# Patient Record
Sex: FEMALE | Race: WHITE | NOT HISPANIC OR LATINO | Employment: OTHER | URBAN - METROPOLITAN AREA
[De-identification: names, ages, dates, MRNs, and addresses within clinical notes are randomized per-mention and may not be internally consistent; named-entity substitution may affect disease eponyms.]

---

## 2022-05-31 ENCOUNTER — OFFICE VISIT (OUTPATIENT)
Dept: PAIN MEDICINE | Facility: CLINIC | Age: 67
End: 2022-05-31

## 2022-05-31 VITALS
RESPIRATION RATE: 16 BRPM | OXYGEN SATURATION: 95 % | DIASTOLIC BLOOD PRESSURE: 72 MMHG | BODY MASS INDEX: 33.75 KG/M2 | SYSTOLIC BLOOD PRESSURE: 132 MMHG | HEART RATE: 77 BPM | WEIGHT: 210 LBS | HEIGHT: 66 IN

## 2022-05-31 DIAGNOSIS — M47.817 LUMBOSACRAL SPONDYLOSIS WITHOUT MYELOPATHY: ICD-10-CM

## 2022-05-31 DIAGNOSIS — M54.16 LUMBAR RADICULITIS: ICD-10-CM

## 2022-05-31 DIAGNOSIS — G89.4 CHRONIC PAIN SYNDROME: Primary | ICD-10-CM

## 2022-05-31 DIAGNOSIS — M79.18 MYOFASCIAL PAIN SYNDROME: ICD-10-CM

## 2022-05-31 PROCEDURE — 99204 OFFICE O/P NEW MOD 45 MIN: CPT | Performed by: ANESTHESIOLOGY

## 2022-05-31 RX ORDER — OMEPRAZOLE 20 MG/1
20 CAPSULE, DELAYED RELEASE ORAL DAILY
COMMUNITY

## 2022-05-31 RX ORDER — HYDROCHLOROTHIAZIDE 25 MG/1
25 TABLET ORAL DAILY
COMMUNITY

## 2022-05-31 RX ORDER — DILTIAZEM HYDROCHLORIDE 180 MG/1
180 CAPSULE, EXTENDED RELEASE ORAL DAILY
COMMUNITY
Start: 2022-04-04

## 2022-05-31 RX ORDER — HYDRALAZINE HYDROCHLORIDE 25 MG/1
25 TABLET, FILM COATED ORAL 2 TIMES DAILY
COMMUNITY

## 2022-05-31 RX ORDER — MELOXICAM 15 MG/1
15 TABLET ORAL DAILY
COMMUNITY

## 2022-05-31 RX ORDER — FLUOXETINE HYDROCHLORIDE 20 MG/1
40 CAPSULE ORAL DAILY
COMMUNITY

## 2022-05-31 RX ORDER — THYROID,PORK 90 MG
90 TABLET ORAL DAILY
COMMUNITY
Start: 2022-04-27

## 2022-05-31 NOTE — PROGRESS NOTES
Subjective   CC back pain  Bernice Barrow is a 67 y.o. female with chronic back pain here for initial evaluation.  Referred by PCP.  Complains of several months of progressively worsening back pain mostly axial occasionally radiating to both hips worse on the right with burning throbbing exacerbated by walking standing or prolonged activity.  Denies saddle anesthesia, weakness, bladder bowel incontinence.  Tried home exercise program, seen chiropractor, tried muscle relaxer anti-inflammatories without relief.  Pain significantly impairing ADL.    Pain Assessment   Location of Pain: Lower Back, R Hip, L Hip,  Description of Pain: Dull/Aching, Throbbing, Stabbing  Previous Pain Rating : 8  Current Pain Ratin  Aggravating Factors: Activity  Alleviating Factors: Rest, Medication  Pain onset over 12 weeks  Interferes with ADL's.   [place] back pain disability scale 68    PEG Assessment   What number best describes your pain on average in the past week?8  What number best describes how, during the past week, pain has interfered with your enjoyment of life?4  What number best describes how, during the past week, pain has interfered with your general activity?9      The following portions of the patient's history were reviewed and updated as appropriate: allergies, current medications, past family history, past medical history, past social history, past surgical history and problem list.     has a past medical history of Arthritis, Bronchitis, Hypertension, Low back pain, and Thyroid disease.   has a past surgical history that includes Replacement total knee (Right); Replacement total knee (Left); Foot surgery (Bilateral); and Colonoscopy.  family history is not on file.  Social History     Tobacco Use   • Smoking status: Former Smoker     Packs/day: 0.50     Quit date:      Years since quittin.4   • Smokeless tobacco: Never Used   Substance Use Topics   • Alcohol use: Yes     Comment: natividadassshawn  "      Review of Systems   Musculoskeletal: Positive for back pain.   All other systems reviewed and are negative.      Objective   Physical Exam  Vitals reviewed.   Constitutional:       General: She is not in acute distress.     Appearance: She is obese.   Musculoskeletal:      Lumbar back: Tenderness present. Decreased range of motion. Positive right straight leg raise test.      Comments: Lumbar loading positive, pain on extension of low back past 5 degrees.  TTP on the lumbar facets noted.         /72   Pulse 77   Resp 16   Ht 167.6 cm (66\")   Wt 95.3 kg (210 lb)   SpO2 95%   BMI 33.89 kg/m²     PHQ 9 on chart  Opioid risk tool low risk      Assessment & Plan   Diagnoses and all orders for this visit:    1. Chronic pain syndrome (Primary)    2. Lumbosacral spondylosis without myelopathy  -     MRI Lumbar Spine Without Contrast; Future    3. Lumbar radiculitis  -     MRI Lumbar Spine Without Contrast; Future  -     Epidural Block    4. Myofascial pain syndrome    Summary  Bernice Barrow is a 67 y.o. female with chronic back pain here for initial evaluation.  Referred by PCP.  Complains of several months of progressively worsening back pain mostly axial occasionally radiating to both hips worse on the right with burning throbbing exacerbated by walking standing or prolonged activity.  Denies saddle anesthesia, weakness, bladder bowel incontinence.  Tried home exercise program, seen chiropractor, tried muscle relaxer anti-inflammatories without relief.  Pain significantly impairing ADL.    L-spine MRI ordered for further evaluation.  Failed conservative therapy.  We will schedule for LESI after MRI review.  Risk and benefits discussed.    RTC for procedure        "

## 2022-06-01 ENCOUNTER — PATIENT ROUNDING (BHMG ONLY) (OUTPATIENT)
Dept: PAIN MEDICINE | Facility: CLINIC | Age: 67
End: 2022-06-01

## 2022-06-01 NOTE — PROGRESS NOTES
June 1, 2022    Hello, may I speak with Bernice Barrow?    My name is Jayleen     I am  with [facility]  [address]  [place] [address].    Before we get started may I verify your date of birth? 1955    I am calling to officially welcome you to our practice and ask about your recent visit. Is this a good time to talk? yes    Tell me about your visit with us. What things went well?  Left message on machine to call me back       We're always looking for ways to make our patients' experiences even better. Do you have recommendations on ways we may improve?  No    Overall were you satisfied with your first visit to our practice? N/A       I appreciate you taking the time to speak with me today. Is there anything else I can do for you? N/A      Thank you, and have a great day.

## 2022-07-08 ENCOUNTER — HOSPITAL ENCOUNTER (OUTPATIENT)
Dept: PAIN MEDICINE | Facility: HOSPITAL | Age: 67
Discharge: HOME | End: 2022-07-08

## 2022-07-08 VITALS
BODY MASS INDEX: 33.75 KG/M2 | OXYGEN SATURATION: 98 % | HEART RATE: 74 BPM | SYSTOLIC BLOOD PRESSURE: 158 MMHG | RESPIRATION RATE: 16 BRPM | WEIGHT: 210 LBS | HEIGHT: 66 IN | DIASTOLIC BLOOD PRESSURE: 82 MMHG | TEMPERATURE: 97.8 F

## 2022-07-08 DIAGNOSIS — R52 PAIN: ICD-10-CM

## 2022-07-08 DIAGNOSIS — M54.16 LUMBAR RADICULITIS: Primary | ICD-10-CM

## 2022-07-08 PROCEDURE — 0 IOPAMIDOL 41 % SOLUTION: Performed by: ANESTHESIOLOGY

## 2022-07-08 PROCEDURE — 62323 NJX INTERLAMINAR LMBR/SAC: CPT | Performed by: ANESTHESIOLOGY

## 2022-07-08 PROCEDURE — 77003 FLUOROGUIDE FOR SPINE INJECT: CPT

## 2022-07-08 PROCEDURE — 25010000002 METHYLPREDNISOLONE PER 40 MG: Performed by: ANESTHESIOLOGY

## 2022-07-08 RX ORDER — METHYLPREDNISOLONE ACETATE 40 MG/ML
40 INJECTION, SUSPENSION INTRA-ARTICULAR; INTRALESIONAL; INTRAMUSCULAR; SOFT TISSUE ONCE
Status: COMPLETED | OUTPATIENT
Start: 2022-07-08 | End: 2022-07-08

## 2022-07-08 RX ADMIN — IOPAMIDOL 3 ML: 408 INJECTION, SOLUTION INTRATHECAL at 11:22

## 2022-07-08 RX ADMIN — METHYLPREDNISOLONE ACETATE 40 MG: 40 INJECTION, SUSPENSION INTRA-ARTICULAR; INTRALESIONAL; INTRAMUSCULAR; INTRASYNOVIAL; SOFT TISSUE at 11:22

## 2022-07-08 NOTE — DISCHARGE INSTRUCTIONS

## 2022-07-08 NOTE — PROCEDURES
"Subjective   CC back pain  Bernice Barrow is a 67 y.o. female with lumbar radiculitis here for LESI. No anticoagulation    Pain Assessment   Location of Pain: Lower Back, R Hip, L Hip, L Leg, neck pain, joint  Description of Pain: Dull/Aching, Throbbing, Stabbing  Previous Pain Rating :7  Current Pain Ratin  Aggravating Factors: Activity  Alleviating Factors: Rest, Medication  Pain onset over 12 weeks  Pain interferes with ADL's    The following portions of the patient's history were reviewed and updated as appropriate: allergies, current medications, past family history, past medical history, past social history, past surgical history and problem list.      Review of Systems  As in HPI  Objective   Physical Exam  Constitutional:       General: She is not in acute distress.     Appearance: She is well-developed.   Cardiovascular:      Rate and Rhythm: Normal rate.   Pulmonary:      Effort: Pulmonary effort is normal.   Musculoskeletal:      Lumbar back: Tenderness present. Decreased range of motion.   Neurological:      Mental Status: She is alert and oriented to person, place, and time.       /90 (BP Location: Right arm, Patient Position: Sitting)   Pulse 77   Temp 97.8 °F (36.6 °C) (Skin)   Resp 16   Ht 167.6 cm (66\")   Wt 95.3 kg (210 lb)   SpO2 99%   BMI 33.89 kg/m²     Assessment & Plan    underwent LESI    RTC 4-6 weeks or as needed for repeat    DATE OF PROCEDURE: 2022    PREOPERATIVE DIAGNOSIS: lumbar DDD/radiculitis    POSTOPERATIVE DIAGNOSIS: same    PROCEDURE PERFORMED:  lumbar Epidural Steroid Injection    The patient presents with a history of   lumbar degenerative disc disease with  radiculitis. The patient presents today for a  lumbar epidural steroid injection at level  L5/S1.   The patient was seen in the preoperative area.  Patient's consent was obtained and updated.  Vitals were taken.  Patient was then brought to the procedure suite and placed in a prone position. The " appropriate anatomic area was widely prepped with Chloraprep and draped in a sterile fashion. Noninvasive monitoring per routine anesthesia protocol was placed.  Under fluoroscopic guidance using  AP view a 20 gauge styleted tuohy needle was passed through skin anesthetized with 1% Lidocaine without epinephrine.  The needle was advanced using the continuous loss of resistance to saline technique into the L5 epidural space. Needle tip placement in the epidural space was confirmed by loss of resistance and injection of 1 mL of  preservative free contrast.  Following this 8 mL of a solution containing  1 mL of 40 mg Depo-Medrol and 7 mL of preservative-free saline was carefully administered in the epidural space.  A sterile dressing was placed over the puncture site.    The patient tolerated the procedure with no complications . They were then brought to the post procedure area where they recovered nicely.

## 2022-08-22 ENCOUNTER — OFFICE VISIT (OUTPATIENT)
Dept: SLEEP MEDICINE | Facility: CLINIC | Age: 67
End: 2022-08-22

## 2022-08-22 VITALS
HEIGHT: 66 IN | SYSTOLIC BLOOD PRESSURE: 135 MMHG | HEART RATE: 74 BPM | DIASTOLIC BLOOD PRESSURE: 89 MMHG | BODY MASS INDEX: 33.75 KG/M2 | OXYGEN SATURATION: 98 % | WEIGHT: 210 LBS

## 2022-08-22 DIAGNOSIS — G47.10 HYPERSOMNIA: ICD-10-CM

## 2022-08-22 DIAGNOSIS — E66.9 OBESITY (BMI 30-39.9): ICD-10-CM

## 2022-08-22 DIAGNOSIS — G47.30 SLEEP APNEA, UNSPECIFIED TYPE: Primary | ICD-10-CM

## 2022-08-22 DIAGNOSIS — G47.8 NON-RESTORATIVE SLEEP: ICD-10-CM

## 2022-08-22 PROCEDURE — G0463 HOSPITAL OUTPT CLINIC VISIT: HCPCS

## 2022-08-22 PROCEDURE — 99204 OFFICE O/P NEW MOD 45 MIN: CPT | Performed by: FAMILY MEDICINE

## 2022-08-22 NOTE — PROGRESS NOTES
[facility] New Patient/Consultation       Reason for Consultation: celestino      Patient Care Team:  Cherri Meyer MD as PCP - General (Family Medicine)  Andreea Morillo MD as Consulting Physician (Sleep Medicine)      History of present illness:  Thank you for asking me to see your patient.  The patient is a 67 y.o. female with hypertension hypothyroidism GERD and CELESTINO presents today to establish care for CELESTINO.  Sleep study over 10 years ago.  Machine is well over 5 years old.  Cannot pull data completely as machine is old.  Overdue for new machine.    Bedtime 8 PM to 9 PM sleep latency 2 to 3 hours wake time 7 AM to 8 AM sleeps around 8 hours sometimes 1 nap per day.  Without CPAP machine will have witnessed apneas waking gasping for breath hypersomnia nonrestorative sleep.  Nocturia 2-3 times a night.  There is a family history of sleep apnea.    Social History: Retired no tobacco use 2 caffeine beverages a day no drug use    Allergies:  Hydrocodone    Family History: CELESTINO yes       Current Outpatient Medications:   •  Leon Thyroid 90 MG tablet, Take 90 mg by mouth Daily., Disp: , Rfl:   •  dilTIAZem (TIAZAC) 180 MG 24 hr capsule, Take 180 mg by mouth Daily., Disp: , Rfl:   •  FLUoxetine (PROzac) 20 MG capsule, Take 40 mg by mouth Daily., Disp: , Rfl:   •  hydrALAZINE (APRESOLINE) 25 MG tablet, Take 25 mg by mouth 2 (Two) Times a Day., Disp: , Rfl:   •  hydroCHLOROthiazide (HYDRODIURIL) 25 MG tablet, Take 25 mg by mouth Daily., Disp: , Rfl:   •  meloxicam (MOBIC) 15 MG tablet, Take 15 mg by mouth Daily., Disp: , Rfl:   •  Mirabegron ER (Myrbetriq) 25 MG tablet sustained-release 24 hour 24 hr tablet, Take 25 mg by mouth Every Night., Disp: , Rfl:   •  omeprazole (priLOSEC) 20 MG capsule, Take 20 mg by mouth Daily., Disp: , Rfl:     Vital Signs:    Vitals:    08/22/22 1124   BP: 135/89   BP Location: Left arm   Patient Position: Sitting   Cuff Size: Adult   Pulse: 74   SpO2: 98%   Weight: 95.3 kg (210 lb)  "  Height: 167.6 cm (66\")      Body mass index is 33.89 kg/m².         REVIEW OF SYSTEMS.  Full review of systems available on the intake form which is scanned in the media tab.  The relevant positive are noted below  1. Daytime excessive sleepiness with [place] Sleepiness Scale :Total score: 5   2. Snoring  3. Frequent urination      Physical exam:  Vitals:    08/22/22 1124   BP: 135/89   BP Location: Left arm   Patient Position: Sitting   Cuff Size: Adult   Pulse: 74   SpO2: 98%   Weight: 95.3 kg (210 lb)   Height: 167.6 cm (66\")    Body mass index is 33.89 kg/m².    HEENT: Head is atraumatic, normocephalic  Eyes: pupils are round equal and reacting to light and accommodation, conjunctiva normal  RESPIRATORY SYSTEM: Regular respirations  CARDIOVASULAR SYSTEM: Regular rate  EXTREMITES: No cyanosis, clubbing  NEUROLOGICAL SYSTEM: Oriented x 3, no gross motor defects, gait normal      Impression:  1. Sleep apnea, unspecified type    2. Hypersomnia    3. Non-restorative sleep    4. Obesity (BMI 30-39.9)        Plan:    Good sleep hygiene measures should be maintained.  Weight loss would be beneficial in this patient who is obese BMI 33.9.    I discussed the pathophysiology of obstructive sleep apnea with the patient.  We discussed the adverse outcomes associated with untreated sleep-disordered breathing.  We discussed treatment modalities of obstructive sleep apnea including CPAP device as well as oral mandibular advancement device. Sleep study will be scheduled to establish definitive diagnosis of sleep disorder breathing.  Weight loss will be strongly beneficial in order to reduce the severity of sleep-disordered breathing.  Patient has narrow oropharyngeal structure.  Caution during activities that require prolonged concentration is strongly advised.  Patient will be notified of sleep study results after sleep study is completed.  If sleep apnea is only mild,  oral mandibular advancement device may be one of the " treatment options.  However if sleep apnea is moderately severe, CPAP treatment will be strongly encouraged.  The patient is not opposed to treatment with CPAP device if we confirm significant obstructive sleep apnea on polysomnography. Patient prefers HST.    Reassess severity of ADY with sleep study.  Then can place order for new auto CPAP machine.  Return to clinic after at least 1 month of CPAP for follow-up or sooner if needed.1 machine when he was brand-new    Thank you for allowing me to participate in your patient's care.    Andreea Morillo MD  Sleep Medicine  08/22/22  11:39 EDT

## 2022-09-15 ENCOUNTER — HOSPITAL ENCOUNTER (OUTPATIENT)
Dept: SLEEP MEDICINE | Facility: HOSPITAL | Age: 67
Discharge: HOME | End: 2022-09-15
Admitting: FAMILY MEDICINE

## 2022-09-15 DIAGNOSIS — G47.10 HYPERSOMNIA: ICD-10-CM

## 2022-09-15 DIAGNOSIS — G47.8 NON-RESTORATIVE SLEEP: ICD-10-CM

## 2022-09-15 DIAGNOSIS — G47.30 SLEEP APNEA, UNSPECIFIED TYPE: ICD-10-CM

## 2022-09-15 DIAGNOSIS — E66.9 OBESITY (BMI 30-39.9): ICD-10-CM

## 2022-09-15 PROCEDURE — 95806 SLEEP STUDY UNATT&RESP EFFT: CPT

## 2022-09-15 PROCEDURE — 95806 SLEEP STUDY UNATT&RESP EFFT: CPT | Performed by: FAMILY MEDICINE

## 2022-09-19 DIAGNOSIS — G47.8 NON-RESTORATIVE SLEEP: ICD-10-CM

## 2022-09-19 DIAGNOSIS — G47.33 OBSTRUCTIVE SLEEP APNEA: Primary | ICD-10-CM

## 2022-09-19 DIAGNOSIS — E66.9 OBESITY (BMI 30-39.9): ICD-10-CM

## 2022-09-19 DIAGNOSIS — G47.10 HYPERSOMNIA: ICD-10-CM

## 2022-11-10 ENCOUNTER — LAB (OUTPATIENT)
Dept: LAB | Facility: HOSPITAL | Age: 67
End: 2022-11-10

## 2022-11-10 ENCOUNTER — TRANSCRIBE ORDERS (OUTPATIENT)
Dept: ADMINISTRATIVE | Facility: HOSPITAL | Age: 67
End: 2022-11-10

## 2022-11-10 ENCOUNTER — HOSPITAL ENCOUNTER (OUTPATIENT)
Dept: CARDIOLOGY | Facility: HOSPITAL | Age: 67
Discharge: HOME | End: 2022-11-10

## 2022-11-10 DIAGNOSIS — N39.46 MIXED INCONTINENCE URGE AND STRESS (MALE)(FEMALE): Primary | ICD-10-CM

## 2022-11-10 DIAGNOSIS — Z01.818 PREOP TESTING: ICD-10-CM

## 2022-11-10 DIAGNOSIS — N39.46 MIXED INCONTINENCE URGE AND STRESS (MALE)(FEMALE): ICD-10-CM

## 2022-11-10 DIAGNOSIS — N36.2 URETHRAL CARUNCLE: ICD-10-CM

## 2022-11-10 LAB
ANION GAP SERPL CALCULATED.3IONS-SCNC: 10.9 MMOL/L (ref 5–15)
BASOPHILS # BLD AUTO: 0.12 10*3/MM3 (ref 0–0.2)
BASOPHILS NFR BLD AUTO: 1.3 % (ref 0–1.5)
BUN SERPL-MCNC: 22 MG/DL (ref 8–23)
BUN/CREAT SERPL: 20 (ref 7–25)
CALCIUM SPEC-SCNC: 9.7 MG/DL (ref 8.6–10.5)
CHLORIDE SERPL-SCNC: 103 MMOL/L (ref 98–107)
CO2 SERPL-SCNC: 25.1 MMOL/L (ref 22–29)
CREAT SERPL-MCNC: 1.1 MG/DL (ref 0.57–1)
DEPRECATED RDW RBC AUTO: 39.9 FL (ref 37–54)
EGFRCR SERPLBLD CKD-EPI 2021: 55.9 ML/MIN/1.73
EOSINOPHIL # BLD AUTO: 0.31 10*3/MM3 (ref 0–0.4)
EOSINOPHIL NFR BLD AUTO: 3.4 % (ref 0.3–6.2)
ERYTHROCYTE [DISTWIDTH] IN BLOOD BY AUTOMATED COUNT: 13.3 % (ref 12.3–15.4)
GLUCOSE SERPL-MCNC: 94 MG/DL (ref 65–99)
HCT VFR BLD AUTO: 37.5 % (ref 34–46.6)
HGB BLD-MCNC: 12 G/DL (ref 12–15.9)
IMM GRANULOCYTES # BLD AUTO: 0.05 10*3/MM3 (ref 0–0.05)
IMM GRANULOCYTES NFR BLD AUTO: 0.5 % (ref 0–0.5)
LYMPHOCYTES # BLD AUTO: 2.06 10*3/MM3 (ref 0.7–3.1)
LYMPHOCYTES NFR BLD AUTO: 22.6 % (ref 19.6–45.3)
MCH RBC QN AUTO: 26.4 PG (ref 26.6–33)
MCHC RBC AUTO-ENTMCNC: 32 G/DL (ref 31.5–35.7)
MCV RBC AUTO: 82.4 FL (ref 79–97)
MONOCYTES # BLD AUTO: 0.76 10*3/MM3 (ref 0.1–0.9)
MONOCYTES NFR BLD AUTO: 8.3 % (ref 5–12)
NEUTROPHILS NFR BLD AUTO: 5.82 10*3/MM3 (ref 1.7–7)
NEUTROPHILS NFR BLD AUTO: 63.9 % (ref 42.7–76)
NRBC BLD AUTO-RTO: 0 /100 WBC (ref 0–0.2)
PLATELET # BLD AUTO: 344 10*3/MM3 (ref 140–450)
PMV BLD AUTO: 11.1 FL (ref 6–12)
POTASSIUM SERPL-SCNC: 4.1 MMOL/L (ref 3.5–5.2)
RBC # BLD AUTO: 4.55 10*6/MM3 (ref 3.77–5.28)
SODIUM SERPL-SCNC: 139 MMOL/L (ref 136–145)
WBC NRBC COR # BLD: 9.12 10*3/MM3 (ref 3.4–10.8)

## 2022-11-10 PROCEDURE — 93005 ELECTROCARDIOGRAM TRACING: CPT | Performed by: UROLOGY

## 2022-11-10 PROCEDURE — 85025 COMPLETE CBC W/AUTO DIFF WBC: CPT

## 2022-11-10 PROCEDURE — 93010 ELECTROCARDIOGRAM REPORT: CPT | Performed by: INTERNAL MEDICINE

## 2022-11-10 PROCEDURE — 36415 COLL VENOUS BLD VENIPUNCTURE: CPT

## 2022-11-10 PROCEDURE — 80048 BASIC METABOLIC PNL TOTAL CA: CPT

## 2022-11-13 LAB — QT INTERVAL: 403 MS

## 2023-09-23 ENCOUNTER — APPOINTMENT (OUTPATIENT)
Dept: GENERAL RADIOLOGY | Facility: HOSPITAL | Age: 68
End: 2023-09-23
Payer: MEDICARE

## 2023-09-23 ENCOUNTER — HOSPITAL ENCOUNTER (OUTPATIENT)
Facility: HOSPITAL | Age: 68
Discharge: HOME | End: 2023-09-23
Attending: PEDIATRICS
Payer: MEDICARE

## 2023-09-23 VITALS
SYSTOLIC BLOOD PRESSURE: 142 MMHG | HEART RATE: 86 BPM | WEIGHT: 197 LBS | RESPIRATION RATE: 17 BRPM | HEIGHT: 66 IN | BODY MASS INDEX: 31.66 KG/M2 | DIASTOLIC BLOOD PRESSURE: 78 MMHG | TEMPERATURE: 98.2 F | OXYGEN SATURATION: 95 %

## 2023-09-23 DIAGNOSIS — J30.89 ALLERGIC RHINITIS DUE TO OTHER ALLERGIC TRIGGER, UNSPECIFIED SEASONALITY: ICD-10-CM

## 2023-09-23 DIAGNOSIS — J40 BRONCHITIS: Primary | ICD-10-CM

## 2023-09-23 LAB
FLUAV SUBTYP SPEC NAA+PROBE: NOT DETECTED
FLUBV RNA ISLT QL NAA+PROBE: NOT DETECTED
SARS-COV-2 RNA RESP QL NAA+PROBE: NOT DETECTED

## 2023-09-23 PROCEDURE — 87636 SARSCOV2 & INF A&B AMP PRB: CPT | Performed by: PEDIATRICS

## 2023-09-23 PROCEDURE — 71046 X-RAY EXAM CHEST 2 VIEWS: CPT

## 2023-09-23 PROCEDURE — G0463 HOSPITAL OUTPT CLINIC VISIT: HCPCS | Performed by: NURSE PRACTITIONER

## 2023-09-23 RX ORDER — AMOXICILLIN AND CLAVULANATE POTASSIUM 875; 125 MG/1; MG/1
1 TABLET, FILM COATED ORAL 2 TIMES DAILY
Qty: 20 TABLET | Refills: 0 | Status: SHIPPED | OUTPATIENT
Start: 2023-09-23 | End: 2023-10-03

## 2023-09-23 RX ORDER — PREDNISONE 20 MG/1
40 TABLET ORAL DAILY
Qty: 14 TABLET | Refills: 0 | Status: SHIPPED | OUTPATIENT
Start: 2023-09-23 | End: 2023-09-30

## 2023-09-23 NOTE — DISCHARGE INSTRUCTIONS
Follow-up with primary care for further evaluation and treatment.    Return to the ER for new or worsening symptoms.    Medications as prescribed    Make sure you are drinking plenty of water    Tylenol/Motrin as needed for pain/fevers

## 2023-09-23 NOTE — FSED PROVIDER NOTE
Subjective   History of Present Illness  The patient is a 68-year-old female who presents to the ER with cough and congestion that started 1 to 2 weeks ago.  Patient reports she has had low-grade fevers at home.  Patient reports that she did have a productive cough of colored sputum but now she is unable to cough anything up. Patient reports she does get bronchitis usually twice a year, but usually not this early.     History provided by:  Patient   used: No      Review of Systems   Constitutional:  Positive for fever.   HENT:  Positive for congestion.    Respiratory:  Positive for cough.      Past Medical History:   Diagnosis Date    Arthritis     Bronchitis     h/o    Hypertension     Low back pain     Thyroid disease        Allergies   Allergen Reactions    Hydrocodone Nausea And Vomiting       Past Surgical History:   Procedure Laterality Date    COLONOSCOPY      FOOT SURGERY Bilateral     heel spurs-- and rt  foot fx    REPLACEMENT TOTAL KNEE Right         REPLACEMENT TOTAL KNEE Left            No family history on file.    Social History     Socioeconomic History    Marital status:    Tobacco Use    Smoking status: Former     Packs/day: 0.50     Types: Cigarettes     Quit date:      Years since quittin.7    Smokeless tobacco: Never   Vaping Use    Vaping Use: Former   Substance and Sexual Activity    Alcohol use: Yes     Comment: ocassional    Drug use: Not Currently    Sexual activity: Defer           Objective   Physical Exam  Vitals and nursing note reviewed.   HENT:      Head: Normocephalic.      Right Ear: Tympanic membrane, ear canal and external ear normal.      Left Ear: Tympanic membrane, ear canal and external ear normal.      Nose: Nose normal.      Mouth/Throat:      Lips: Pink.      Mouth: Mucous membranes are moist.      Pharynx: Oropharynx is clear. Uvula midline.   Eyes:      Extraocular Movements: Extraocular movements intact.      Conjunctiva/sclera:  Conjunctivae normal.      Pupils: Pupils are equal, round, and reactive to light.   Cardiovascular:      Rate and Rhythm: Normal rate and regular rhythm.      Pulses: Normal pulses.      Heart sounds: Normal heart sounds.   Pulmonary:      Effort: Pulmonary effort is normal.      Breath sounds: Normal breath sounds and air entry.      Comments: Patient with productive cough at times, had more of a productive cough in the beginning and now is not able to cough up anything.   Abdominal:      General: Bowel sounds are normal.      Palpations: Abdomen is soft.   Musculoskeletal:         General: Normal range of motion.      Cervical back: Normal range of motion and neck supple.   Skin:     General: Skin is warm and dry.   Neurological:      General: No focal deficit present.      Mental Status: She is alert and oriented to person, place, and time.   Psychiatric:         Behavior: Behavior normal. Behavior is cooperative.       Procedures           ED Course  ED Course as of 09/23/23 1731   Sat Sep 23, 2023   1657 XR CHEST 2 VW     Date of Exam: 9/23/2023 4:02 PM EDT     Indication: Cough     Comparison: Chest radiograph dated 1/5/2019     Findings:  The cardiomediastinal silhouette is within normal limits. There is aortic arch atherosclerotic calcification. Pulmonary vascularity appears normal. There is no focal airspace consolidation, pleural effusion, or pneumothorax. There are degenerative   changes of the thoracic spine.     IMPRESSION:  Impression:  1. No acute cardiopulmonary abnormality.      [DS]   1708 COVID19: Not Detected [DS]   1708 Influenza A PCR: Not Detected [DS]   1708 Influenza B PCR: Not Detected [DS]      ED Course User Index  [DS] Nicki Cabral APRN                                           Medical Decision Making  This patient presents with symptoms suspicious for likely viral upper respiratory infection. Based on history and physical doubt sinusitis. COVID/Flu is negative. CXR is negative  at this time.  Do not suspect underlying cardiopulmonary process. I considered, but think unlikely, dangerous causes of this patient's symptoms to include ACS, CHF or COPD exacerbations, pneumonia, pneumothorax. Patient is nontoxic appearing and not in need of emergent medical intervention. Patient reports low grade fevers at times, productive cough at times. Patient reports she has been having symptoms for the past week.     Will treat patient with augmentin and steroids, patient advised that this could be viral, and the antibiotic may not help.      Problems Addressed:  Allergic rhinitis due to other allergic trigger, unspecified seasonality: self-limited or minor problem  Bronchitis: self-limited or minor problem    Amount and/or Complexity of Data Reviewed  Labs:  Decision-making details documented in ED Course.  Radiology: ordered. Decision-making details documented in ED Course.    Risk  Prescription drug management.        Final diagnoses:   Bronchitis   Allergic rhinitis due to other allergic trigger, unspecified seasonality       ED Disposition  ED Disposition       ED Disposition   Discharge    Condition   Stable    Comment   --               PATIENT CONNECTION - [facility] [address] [phone]  In 1 week  As needed, If symptoms worsen, if you call this number they will help you find a primary care physician if needed.         Medication List        New Prescriptions      amoxicillin-clavulanate 875-125 MG per tablet  Commonly known as: AUGMENTIN  Take 1 tablet by mouth 2 (Two) Times a Day for 10 days.     predniSONE 20 MG tablet  Commonly known as: DELTASONE  Take 2 tablets by mouth Daily for 7 days.               Where to Get Your Medications        These medications were sent to [id number] - [place], [place] - [address] [facility] [address] - [phone]  - [phone]   [address] [place] [address]      Phone: [phone]    amoxicillin-clavulanate 875-125 MG per tablet  predniSONE 20 MG tablet

## 2023-10-13 ENCOUNTER — TRANSCRIBE ORDERS (OUTPATIENT)
Dept: LAB | Facility: HOSPITAL | Age: 68
End: 2023-10-13
Payer: MEDICARE

## 2023-10-13 ENCOUNTER — LAB (OUTPATIENT)
Dept: LAB | Facility: HOSPITAL | Age: 68
End: 2023-10-13
Payer: MEDICARE

## 2023-10-13 DIAGNOSIS — E78.2 MIXED HYPERLIPIDEMIA: ICD-10-CM

## 2023-10-13 DIAGNOSIS — E55.9 VITAMIN D DEFICIENCY: ICD-10-CM

## 2023-10-13 DIAGNOSIS — E87.6 HYPOPOTASSEMIA: ICD-10-CM

## 2023-10-13 DIAGNOSIS — E11.9 DIABETES MELLITUS WITHOUT COMPLICATION: Primary | ICD-10-CM

## 2023-10-13 DIAGNOSIS — K22.70 BARRETT'S ESOPHAGUS WITH ESOPHAGITIS: ICD-10-CM

## 2023-10-13 DIAGNOSIS — K21.9 CHALASIA OF LOWER ESOPHAGEAL SPHINCTER: ICD-10-CM

## 2023-10-13 DIAGNOSIS — I10 ESSENTIAL HYPERTENSION, MALIGNANT: ICD-10-CM

## 2023-10-13 DIAGNOSIS — E03.9 MYXEDEMA HEART DISEASE: ICD-10-CM

## 2023-10-13 DIAGNOSIS — E11.9 DIABETES MELLITUS WITHOUT COMPLICATION: ICD-10-CM

## 2023-10-13 DIAGNOSIS — I51.9 MYXEDEMA HEART DISEASE: ICD-10-CM

## 2023-10-13 DIAGNOSIS — K20.90 BARRETT'S ESOPHAGUS WITH ESOPHAGITIS: ICD-10-CM

## 2023-10-13 LAB
25(OH)D3 SERPL-MCNC: 34.2 NG/ML (ref 30–100)
ALBUMIN SERPL-MCNC: 4.3 G/DL (ref 3.5–5.2)
ALBUMIN UR-MCNC: 2 MG/DL
ALBUMIN/GLOB SERPL: 1.5 G/DL
ALP SERPL-CCNC: 75 U/L (ref 39–117)
ALT SERPL W P-5'-P-CCNC: 16 U/L (ref 1–33)
ANION GAP SERPL CALCULATED.3IONS-SCNC: 10.8 MMOL/L (ref 5–15)
AST SERPL-CCNC: 19 U/L (ref 1–32)
BASOPHILS # BLD AUTO: 0.11 10*3/MM3 (ref 0–0.2)
BASOPHILS NFR BLD AUTO: 1.1 % (ref 0–1.5)
BILIRUB SERPL-MCNC: 0.8 MG/DL (ref 0–1.2)
BUN SERPL-MCNC: 20 MG/DL (ref 8–23)
BUN/CREAT SERPL: 17.7 (ref 7–25)
CALCIUM SPEC-SCNC: 9.7 MG/DL (ref 8.6–10.5)
CHLORIDE SERPL-SCNC: 103 MMOL/L (ref 98–107)
CHOLEST SERPL-MCNC: 194 MG/DL (ref 0–200)
CO2 SERPL-SCNC: 25.2 MMOL/L (ref 22–29)
CREAT SERPL-MCNC: 1.13 MG/DL (ref 0.57–1)
CREAT UR-MCNC: 177.8 MG/DL
DEPRECATED RDW RBC AUTO: 40.9 FL (ref 37–54)
EGFRCR SERPLBLD CKD-EPI 2021: 53.8 ML/MIN/1.73
EOSINOPHIL # BLD AUTO: 0.31 10*3/MM3 (ref 0–0.4)
EOSINOPHIL NFR BLD AUTO: 3.1 % (ref 0.3–6.2)
ERYTHROCYTE [DISTWIDTH] IN BLOOD BY AUTOMATED COUNT: 14.2 % (ref 12.3–15.4)
GLOBULIN UR ELPH-MCNC: 2.9 GM/DL
GLUCOSE SERPL-MCNC: 97 MG/DL (ref 65–99)
HBA1C MFR BLD: 5.6 % (ref 4.8–5.6)
HCT VFR BLD AUTO: 37.7 % (ref 34–46.6)
HDLC SERPL-MCNC: 81 MG/DL (ref 40–60)
HGB BLD-MCNC: 12.1 G/DL (ref 12–15.9)
IMM GRANULOCYTES # BLD AUTO: 0.03 10*3/MM3 (ref 0–0.05)
IMM GRANULOCYTES NFR BLD AUTO: 0.3 % (ref 0–0.5)
LDLC SERPL CALC-MCNC: 94 MG/DL (ref 0–100)
LDLC/HDLC SERPL: 1.12 {RATIO}
LYMPHOCYTES # BLD AUTO: 1.59 10*3/MM3 (ref 0.7–3.1)
LYMPHOCYTES NFR BLD AUTO: 16.1 % (ref 19.6–45.3)
MCH RBC QN AUTO: 25.8 PG (ref 26.6–33)
MCHC RBC AUTO-ENTMCNC: 32.1 G/DL (ref 31.5–35.7)
MCV RBC AUTO: 80.4 FL (ref 79–97)
MICROALBUMIN/CREAT UR: 11.2 MG/G (ref 0–29)
MONOCYTES # BLD AUTO: 0.87 10*3/MM3 (ref 0.1–0.9)
MONOCYTES NFR BLD AUTO: 8.8 % (ref 5–12)
NEUTROPHILS NFR BLD AUTO: 6.98 10*3/MM3 (ref 1.7–7)
NEUTROPHILS NFR BLD AUTO: 70.6 % (ref 42.7–76)
NRBC BLD AUTO-RTO: 0 /100 WBC (ref 0–0.2)
PLATELET # BLD AUTO: 366 10*3/MM3 (ref 140–450)
PMV BLD AUTO: 11.5 FL (ref 6–12)
POTASSIUM SERPL-SCNC: 3.7 MMOL/L (ref 3.5–5.2)
PROT SERPL-MCNC: 7.2 G/DL (ref 6–8.5)
RBC # BLD AUTO: 4.69 10*6/MM3 (ref 3.77–5.28)
SODIUM SERPL-SCNC: 139 MMOL/L (ref 136–145)
TRIGL SERPL-MCNC: 111 MG/DL (ref 0–150)
URATE SERPL-MCNC: 4.4 MG/DL (ref 2.4–5.7)
VLDLC SERPL-MCNC: 19 MG/DL (ref 5–40)
WBC NRBC COR # BLD: 9.89 10*3/MM3 (ref 3.4–10.8)

## 2023-10-13 PROCEDURE — 36415 COLL VENOUS BLD VENIPUNCTURE: CPT

## 2023-10-13 PROCEDURE — 82306 VITAMIN D 25 HYDROXY: CPT

## 2023-10-13 PROCEDURE — 83036 HEMOGLOBIN GLYCOSYLATED A1C: CPT

## 2023-10-13 PROCEDURE — 80053 COMPREHEN METABOLIC PANEL: CPT

## 2023-10-13 PROCEDURE — 84550 ASSAY OF BLOOD/URIC ACID: CPT

## 2023-10-13 PROCEDURE — 80061 LIPID PANEL: CPT

## 2023-10-13 PROCEDURE — 82043 UR ALBUMIN QUANTITATIVE: CPT

## 2023-10-13 PROCEDURE — 85025 COMPLETE CBC W/AUTO DIFF WBC: CPT

## 2023-10-13 PROCEDURE — 82570 ASSAY OF URINE CREATININE: CPT

## 2024-01-18 ENCOUNTER — OFFICE VISIT (OUTPATIENT)
Dept: FAMILY MEDICINE CLINIC | Facility: CLINIC | Age: 69
End: 2024-01-18
Payer: MEDICARE

## 2024-01-18 ENCOUNTER — LAB (OUTPATIENT)
Dept: FAMILY MEDICINE CLINIC | Facility: CLINIC | Age: 69
End: 2024-01-18
Payer: MEDICARE

## 2024-01-18 VITALS
HEART RATE: 80 BPM | HEIGHT: 66 IN | OXYGEN SATURATION: 98 % | BODY MASS INDEX: 27.8 KG/M2 | DIASTOLIC BLOOD PRESSURE: 84 MMHG | SYSTOLIC BLOOD PRESSURE: 132 MMHG | RESPIRATION RATE: 20 BRPM | WEIGHT: 173 LBS

## 2024-01-18 DIAGNOSIS — E03.9 HYPOTHYROIDISM, UNSPECIFIED TYPE: ICD-10-CM

## 2024-01-18 DIAGNOSIS — Z76.89 ENCOUNTER TO ESTABLISH CARE: Primary | ICD-10-CM

## 2024-01-18 DIAGNOSIS — I10 HYPERTENSION, UNSPECIFIED TYPE: ICD-10-CM

## 2024-01-18 DIAGNOSIS — G47.00 INSOMNIA, UNSPECIFIED TYPE: ICD-10-CM

## 2024-01-18 LAB
ANION GAP SERPL CALCULATED.3IONS-SCNC: 14 MMOL/L (ref 5–15)
BUN SERPL-MCNC: 24 MG/DL (ref 8–23)
BUN/CREAT SERPL: 24 (ref 7–25)
CALCIUM SPEC-SCNC: 10 MG/DL (ref 8.6–10.5)
CHLORIDE SERPL-SCNC: 101 MMOL/L (ref 98–107)
CO2 SERPL-SCNC: 25 MMOL/L (ref 22–29)
CREAT SERPL-MCNC: 1 MG/DL (ref 0.57–1)
EGFRCR SERPLBLD CKD-EPI 2021: 62.3 ML/MIN/1.73
GLUCOSE SERPL-MCNC: 103 MG/DL (ref 65–99)
POTASSIUM SERPL-SCNC: 3.2 MMOL/L (ref 3.5–5.2)
SODIUM SERPL-SCNC: 140 MMOL/L (ref 136–145)
T4 FREE SERPL-MCNC: 0.97 NG/DL (ref 0.93–1.7)
TSH SERPL DL<=0.05 MIU/L-ACNC: 7.08 UIU/ML (ref 0.27–4.2)

## 2024-01-18 PROCEDURE — 80048 BASIC METABOLIC PNL TOTAL CA: CPT | Performed by: NURSE PRACTITIONER

## 2024-01-18 PROCEDURE — 36415 COLL VENOUS BLD VENIPUNCTURE: CPT

## 2024-01-18 PROCEDURE — 84439 ASSAY OF FREE THYROXINE: CPT | Performed by: NURSE PRACTITIONER

## 2024-01-18 PROCEDURE — 84443 ASSAY THYROID STIM HORMONE: CPT | Performed by: NURSE PRACTITIONER

## 2024-01-18 RX ORDER — HYDROXYZINE HYDROCHLORIDE 25 MG/1
25 TABLET, FILM COATED ORAL NIGHTLY PRN
Qty: 30 TABLET | Refills: 1 | Status: SHIPPED | OUTPATIENT
Start: 2024-01-18

## 2024-01-18 RX ORDER — MECOBALAMIN 5000 MCG
15 TABLET,DISINTEGRATING ORAL DAILY
COMMUNITY

## 2024-01-18 NOTE — PROGRESS NOTES
Chief Complaint  Establish Care, Hypertension, and Fatigue (Not sleeping)    Subjective          Bernice Barrow presents to [facility]  History of Present Illness    Is a new patient, here today to establish care, insomnia, htn    H/o arthritis, HTN, thyroid disease, low back pain, gerd, depression/anxiety, oab (had bladder sling placed), Gay's esophagus    Current medicines are armour thyroid, diltiazem, hctz, fluoxetine, meloxicam, hydralazine, omeprazole    Has an appointment to see GI next month, Dr. Walker, has h/o Gay's esophagus, is due for EGD and is anticipating being scheduled for colonoscopy as well    She endorses that she does not exercise like she should, has been working on weight loss    She tells me that she was 230 # last spring, today she weights 173    HTN is managed with diltiazem 180 mg qd, hctz 25 mg, hydralazine 25 mg bid  She does check her BP at home, tells me that it has been running 140s/90s  She tells me that she watches her sodium intake and tries to stay well hydrated    She endorses that she has been on armour thyroid in the past, her thyroid has been better managed with the armour thyroid    She is UTD on Mammogram, gets them at [facility], last one was about 1 year ago    Review of Systems   Constitutional: Negative.  Negative for appetite change and fever.   Respiratory: Negative.  Negative for shortness of breath.    Cardiovascular: Negative.  Negative for chest pain.   Musculoskeletal:  Positive for arthralgias and joint swelling.        Is having joint pain in the right ankle and foot, has an appointment scheduled to see Dr. Yi   Neurological:  Positive for light-headedness. Negative for dizziness and headaches.        She endorses occasionally feeling off balance when walking   Psychiatric/Behavioral:  Positive for dysphoric mood and sleep disturbance. The patient is nervous/anxious.         She endorses that she has always had  "trouble sleeping intermittently  She tells me that when she lays down, her mind will not shut down to relax and go to sleep  She has tried melatonin without any relief    She uses prozac for depression and anxiety    She tells me that she tries to avoid pills because she has a sister with h/o taking pills     Objective   Vital Signs:  /84 (BP Location: Left arm, Patient Position: Sitting)   Pulse 80   Resp 20   Ht 167.6 cm (65.98\")   Wt 78.5 kg (173 lb)   SpO2 98%   BMI 27.94 kg/m²     BP Readings from Last 3 Encounters:   01/18/24 132/84   09/23/23 142/78   08/22/22 135/89        Wt Readings from Last 3 Encounters:   01/18/24 78.5 kg (173 lb)   09/23/23 89.4 kg (197 lb)   08/22/22 95.3 kg (210 lb)        BMI is >= 25 and <30. (Overweight) The following options were offered after discussion;: exercise counseling/recommendations and nutrition counseling/recommendations      Physical Exam  Vitals reviewed.   Constitutional:       Appearance: Normal appearance.   Neck:      Vascular: No carotid bruit.   Cardiovascular:      Rate and Rhythm: Normal rate and regular rhythm.      Heart sounds: Normal heart sounds.   Pulmonary:      Effort: Pulmonary effort is normal.      Breath sounds: Normal breath sounds.   Musculoskeletal:      Right lower leg: Edema present.      Left lower leg: No edema.   Neurological:      Mental Status: She is alert and oriented to person, place, and time.   Psychiatric:         Mood and Affect: Mood normal.         Behavior: Behavior normal.        Result Review :       Labs 10/13/23:  Uric Acid 4.4  Vitamin D 34.2    CMP          10/13/2023    07:37   CMP   Glucose 97    BUN 20    Creatinine 1.13    EGFR 53.8    Sodium 139    Potassium 3.7    Chloride 103    Calcium 9.7    Total Protein 7.2    Albumin 4.3    Globulin 2.9    Total Bilirubin 0.8    Alkaline Phosphatase 75    AST (SGOT) 19    ALT (SGPT) 16    Albumin/Globulin Ratio 1.5    BUN/Creatinine Ratio 17.7    Anion Gap 10.8  "     CBC w/diff          10/13/2023    07:37   CBC w/Diff   WBC 9.89    RBC 4.69    Hemoglobin 12.1    Hematocrit 37.7    MCV 80.4    MCH 25.8    MCHC 32.1    RDW 14.2    Platelets 366    Neutrophil Rel % 70.6    Immature Granulocyte Rel % 0.3    Lymphocyte Rel % 16.1    Monocyte Rel % 8.8    Eosinophil Rel % 3.1    Basophil Rel % 1.1      Lipid Panel          10/13/2023    07:37   Lipid Panel   Total Cholesterol 194    Triglycerides 111    HDL Cholesterol 81    VLDL Cholesterol 19    LDL Cholesterol  94    LDL/HDL Ratio 1.12      Most Recent A1C          10/13/2023    07:37   HGBA1C Most Recent   Hemoglobin A1C 5.60      Microalbumin          10/13/2023    07:37   Microalbumin   Microalbumin, Urine 2.0                Assessment and Plan    Diagnoses and all orders for this visit:    1. Encounter to establish care (Primary)    2. Hypertension, unspecified type  -     Basic metabolic panel; Future    3. Insomnia, unspecified type  -     hydrOXYzine (ATARAX) 25 MG tablet; Take 1 tablet by mouth At Night As Needed for Anxiety.  Dispense: 30 tablet; Refill: 1    4. Hypothyroidism, unspecified type  -     TSH Rfx On Abnormal To Free T4; Future    Will continue current medicines as prescribed  Add hydroxyzine hs prn sleep         Follow Up   Return in about 6 months (around 7/18/2024), or or as needed, for Recheck HTN, annual physical.  Patient was given instructions and counseling regarding her condition or for health maintenance advice. Please see specific information pulled into the AVS if appropriate.

## 2024-01-21 RX ORDER — DILTIAZEM HYDROCHLORIDE 180 MG/1
180 CAPSULE, EXTENDED RELEASE ORAL DAILY
Qty: 90 CAPSULE | Refills: 1 | Status: SHIPPED | OUTPATIENT
Start: 2024-01-21

## 2024-01-21 RX ORDER — THYROID,PORK 90 MG
90 TABLET ORAL DAILY
Qty: 90 TABLET | Refills: 1 | Status: SHIPPED | OUTPATIENT
Start: 2024-01-21

## 2024-01-22 ENCOUNTER — APPOINTMENT (OUTPATIENT)
Dept: GENERAL RADIOLOGY | Facility: HOSPITAL | Age: 69
End: 2024-01-22
Payer: MEDICARE

## 2024-01-22 ENCOUNTER — HOSPITAL ENCOUNTER (EMERGENCY)
Facility: HOSPITAL | Age: 69
Discharge: HOME | End: 2024-01-22
Attending: EMERGENCY MEDICINE | Admitting: EMERGENCY MEDICINE
Payer: MEDICARE

## 2024-01-22 VITALS
HEART RATE: 79 BPM | WEIGHT: 173.06 LBS | TEMPERATURE: 97.8 F | SYSTOLIC BLOOD PRESSURE: 136 MMHG | BODY MASS INDEX: 27.16 KG/M2 | HEIGHT: 67 IN | DIASTOLIC BLOOD PRESSURE: 88 MMHG | OXYGEN SATURATION: 98 % | RESPIRATION RATE: 16 BRPM

## 2024-01-22 DIAGNOSIS — M54.31 SCIATICA OF RIGHT SIDE: Primary | ICD-10-CM

## 2024-01-22 PROCEDURE — 72110 X-RAY EXAM L-2 SPINE 4/>VWS: CPT

## 2024-01-22 PROCEDURE — 25010000002 HYDROMORPHONE 1 MG/ML SOLUTION: Performed by: NURSE PRACTITIONER

## 2024-01-22 PROCEDURE — 25010000002 METHYLPREDNISOLONE PER 125 MG: Performed by: NURSE PRACTITIONER

## 2024-01-22 PROCEDURE — 99283 EMERGENCY DEPT VISIT LOW MDM: CPT

## 2024-01-22 PROCEDURE — 96372 THER/PROPH/DIAG INJ SC/IM: CPT

## 2024-01-22 RX ORDER — TIZANIDINE 4 MG/1
4 TABLET ORAL NIGHTLY PRN
Qty: 10 TABLET | Refills: 0 | Status: SHIPPED | OUTPATIENT
Start: 2024-01-22

## 2024-01-22 RX ORDER — CYCLOBENZAPRINE HCL 10 MG
10 TABLET ORAL ONCE
Status: COMPLETED | OUTPATIENT
Start: 2024-01-22 | End: 2024-01-22

## 2024-01-22 RX ORDER — METHYLPREDNISOLONE 4 MG/1
TABLET ORAL
Qty: 21 TABLET | Refills: 0 | Status: SHIPPED | OUTPATIENT
Start: 2024-01-22 | End: 2024-01-26 | Stop reason: ALTCHOICE

## 2024-01-22 RX ORDER — METHYLPREDNISOLONE SODIUM SUCCINATE 125 MG/2ML
80 INJECTION, POWDER, LYOPHILIZED, FOR SOLUTION INTRAMUSCULAR; INTRAVENOUS ONCE
Status: COMPLETED | OUTPATIENT
Start: 2024-01-22 | End: 2024-01-22

## 2024-01-22 RX ORDER — TRAMADOL HYDROCHLORIDE 50 MG/1
50 TABLET ORAL EVERY 6 HOURS PRN
Qty: 12 TABLET | Refills: 0 | Status: SHIPPED | OUTPATIENT
Start: 2024-01-22

## 2024-01-22 RX ADMIN — CYCLOBENZAPRINE 10 MG: 10 TABLET, FILM COATED ORAL at 14:49

## 2024-01-22 RX ADMIN — METHYLPREDNISOLONE SODIUM SUCCINATE 80 MG: 125 INJECTION, POWDER, FOR SOLUTION INTRAMUSCULAR; INTRAVENOUS at 14:57

## 2024-01-22 RX ADMIN — HYDROMORPHONE HYDROCHLORIDE 1 MG: 1 INJECTION, SOLUTION INTRAMUSCULAR; INTRAVENOUS; SUBCUTANEOUS at 14:52

## 2024-01-22 NOTE — ED PROVIDER NOTES
Subjective   History of Present Illness  Patient is a 68-year-old white female with history of hypertension and chronic low back pain presents today for complaints of increasing pain in her lower back radiating to her right buttock and hip into the right leg.  States her pain started a few days ago with no known injury or trauma.  It is constant but worse with certain movements.  She denies any numbness tingling or weakness in either lower extremity.  She denies any bowel or bladder dysfunction or saddle anesthesia.  No fever.      Review of Systems   Constitutional:  Negative for fever.   Genitourinary:  Negative for difficulty urinating.   Musculoskeletal:  Positive for back pain.   Neurological:  Negative for weakness.       Past Medical History:   Diagnosis Date    Arthritis     Bronchitis     h/o    Hypertension     Low back pain     Thyroid disease        Allergies   Allergen Reactions    Hydrocodone Nausea And Vomiting       Past Surgical History:   Procedure Laterality Date    COLONOSCOPY      FOOT SURGERY Bilateral     heel spurs-- and rt  foot fx    REPLACEMENT TOTAL KNEE Right         REPLACEMENT TOTAL KNEE Left            No family history on file.    Social History     Socioeconomic History    Marital status:    Tobacco Use    Smoking status: Former     Packs/day: .5     Types: Cigarettes     Quit date:      Years since quittin.0    Smokeless tobacco: Never   Vaping Use    Vaping Use: Former   Substance and Sexual Activity    Alcohol use: Yes     Comment: ocassional    Drug use: Not Currently    Sexual activity: Defer           Objective   Physical Exam  Vital signs and triage nurse note reviewed.  Constitutional: Awake, alert; well-developed and well-nourished. No acute distress is noted.  Neck: Supple, full range of motion without pain; no cervical lymphadenopathy. Normal phonation.  Cardiovascular: Regular rate and rhythm, normal S1-S2.  No murmur noted.  Pulmonary: Respiratory  effort regular nonlabored, breath sounds clear to auscultation all fields.  Abdomen: Soft, nontender, nondistended with normoactive bowel sounds; no rebound or guarding.  Musculoskeletal: Independent range of motion of all extremities with no palpable tenderness or edema.  Mild tenderness over the midline lumbar spine without crepitus or step-off noted, tenderness extends over the right hip posteriorly and right buttock.  No overlying erythema or rash.  Neuro: Alert oriented x3, speech is clear and appropriate, GCS 15.    Skin: Flesh tone, warm, dry, intact; no erythematous or petechial rash or lesion.    Procedures           ED Course      Labs Reviewed - No data to display  XR Spine Lumbar Complete 4+VW    Result Date: 1/22/2024  Impression: 1. Advanced multilevel degenerative disc and facet disease with pars defects at L5/S1. Electronically Signed: Mare Mccabe MD  1/22/2024 1:58 PM CST  Workstation ID: [id number]   Medications   cyclobenzaprine (FLEXERIL) tablet 10 mg (10 mg Oral Given 1/22/24 4750)   HYDROmorphone (DILAUDID) injection 1 mg (1 mg Subcutaneous Given 1/22/24 1452)   methylPREDNISolone sodium succinate (SOLU-Medrol) injection 80 mg (80 mg Intramuscular Given 1/22/24 1457)                                            Medical Decision Making  Patient presents today with the above complaint.    She had the above exam evaluation.  X-rays were obtained.  She was given IM Solu-Medrol, IM Dilaudid and oral Flexeril.    Workup: X-ray shows advanced multilevel degenerative disc and facet disease with pars defects at L5/ S1.    On reexamination patient resting quietly no distress.  States she is feeling better and her pain is improved.  She is ambulatory without difficulty.    Findings were discussed with her.  She will be discharged home with prescription for tizanidine, tramadol and a Medrol Dosepak and instructed to follow-up with her primary care provider.  She was given warning signs for prompt return  to the ED and voiced understanding.  Inspect reviewed.    Problems Addressed:  Sciatica of right side: complicated acute illness or injury    Amount and/or Complexity of Data Reviewed  Radiology: ordered.    Risk  Prescription drug management.        Final diagnoses:   Sciatica of right side       ED Disposition  ED Disposition       ED Disposition   Discharge    Condition   Stable    Comment   --               Apollo Arellano, APRN  [address]  [address]  [place] [address]  [phone]          Zack Bajwa IV, MD  [address]  [place] [address] [phone]               Medication List        New Prescriptions      methylPREDNISolone 4 MG dose pack  Commonly known as: MEDROL  Take as directed on package instructions.     tiZANidine 4 MG tablet  Commonly known as: ZANAFLEX  Take 1 tablet by mouth At Night As Needed for Muscle Spasms.     traMADol 50 MG tablet  Commonly known as: ULTRAM  Take 1 tablet by mouth Every 6 (Six) Hours As Needed for Moderate Pain.               Where to Get Your Medications        These medications were sent to [id number] - [place], [place] - [address] AT SEC OF [facility] & [address] - [phone]  - [phone]   [address] [place] [address]      Phone: [phone]   methylPREDNISolone 4 MG dose pack  tiZANidine 4 MG tablet  traMADol 50 MG tablet            Radha Yip, OLMAN  01/22/24 4177

## 2024-01-22 NOTE — DISCHARGE INSTRUCTIONS
Medications as directed for pain, inflammation, muscle relaxation; light massage and range of motion exercises to improve the discomfort; back exercises 2-3 times daily; no heavy lifting, repeated bending or stooping for 3-5 days, gradually increase activity as tolerated by pain; return for numbness to the inner thighs, genitals or rectum, loss of control of your bowels or bladder, inability to urinate or worsening pain or symptoms. Follow up with primary care physician or spine doctor if no improvement in 2-3 days.

## 2024-01-25 NOTE — PROGRESS NOTES
Subjective   History of Present Illness: Bernice Barrow is a 68 y.o. female is being seen for consultation today at the request of Zack Roy MD for exacerbation of her lower back pain and leg pain.  Patient reports a chronic history of low back pain and in the past left leg pain and paresthesias she did start to experience some pretty severe right leg pain extending from the right side of her low back into her lateral portion of her right thigh last week.  There is no inciting event.  She was seen and evaluated in the ED where she was given some steroids that have not really helped too much.  Her back pain and right leg pain are worse at night.  She does not report any neurogenic claudication symptoms per se in the past, patient has undergone injections that have helped somewhat and physical therapy that was not very beneficial.  Upon further conversation, patient has been experiencing worsening balance issues, dexterity issues and feels like she is dropping items.  She has some chronic neck myalgias but has never been seen or evaluated for her neck symptoms.  She reports no history of osteopenia but has not had any updated DEXA scan for she thinks at least 8 to 10 years.    History of Present Illness    The following portions of the patient's history were reviewed and updated as appropriate: allergies, current medications, past family history, past medical history, past social history, past surgical history, and problem list.    Review of Systems   Constitutional:  Positive for activity change.   HENT: Negative.     Eyes: Negative.    Respiratory: Negative.     Cardiovascular: Negative.    Gastrointestinal: Negative.    Endocrine: Negative.    Genitourinary: Negative.    Musculoskeletal:  Positive for arthralgias, back pain (right sided/buttuck/hip/leg) and myalgias.   Skin: Negative.    Allergic/Immunologic: Negative.    Neurological:  Positive for weakness and numbness (tingling).   Hematological:  "Negative.    Psychiatric/Behavioral:  Positive for sleep disturbance.    All other systems reviewed and are negative.      Objective     ./98   Pulse 82   Ht 170.2 cm (67\")   Wt 78.2 kg (172 lb 8 oz)   BMI 27.02 kg/m²    Body mass index is 27.02 kg/m².    Vitals:    01/26/24 1056   PainSc:   8          Physical Exam  Neurologic Exam  Bilateral lower extremity motor strength 5/5  Bilateral upper motor strength 5/5  Positive Lori  Bilateral patellar reflexes 4+  Bilateral upper extremity reflexes, triceps 3+, biceps 3+, brachioradialis 2+.  Nonantalgic gait        Assessment & Plan   Independent Review of Radiographic Studies:      I personally reviewed the images from the following studies.    Lumbar x-ray 1/22/2024      Advanced degenerative changes with mild levocurvature with apex at L3. Bilateral pars defects L5-S1 with 1.2 cm anterolisthesis.     Medical Decision Making:      Bernice Zabala a 68 y.o. female with spondylolisthesis secondary to pars defects at L5-S1 with acute on chronic back and leg pain.  Her symptoms are consistent and do correlate with her imaging unlikely along the L5-S1 level secondary to her significant subluxation.  Patient will need advanced imaging to further investigate her back and leg symptoms.  Patient also found to be objectively myelopathic in regards to her neck and hand symptoms and will be sent for advanced imaging as well for her cervical spine.  I did tell patient that her cervical spine will take precedence should it show significant compressive etiology.  There is no emergent symptoms reported or exam findings that would necessitate any stat imaging.  I will switch her Medrol Dosepak to a dexamethasone pack to see if this gives her any further inflammation relief for her low back symptoms and place her on gabapentin.  We will follow-up after imaging for further recommendations.  I also have recommended updated DEXA scan as well for any potential future " surgical intervention.  I have encouraged her to call the office with any questions or concerns in the interim    Advanced imaging (i.e. MRI, CT scan, or CT myelogram) was ordered today during your office visit. Once this order is approved by insurance, our office will call you ASAP in regards to appointment details for your test. If there is a waiting time on this, it is because we are waiting on approval from your insurance.     Please schedule a follow-up appointment to discuss your test results in the office.    For the fastest turn around time for your advanced imaging to be reviewed by a provider and uploaded into our system, pick a [facility] facility.     If you choose non-[facility] facility, you will be responsible for bringing the images on a CD to your follow-up appointment. If you forget the CD at the time of your appointment, you may be asked to reschedule.          Diagnoses and all orders for this visit:    1. Cervical myelopathy (Primary)  -     MRI Cervical Spine Without Contrast; Future  -     XR spine cervical ap and lat w flex and ext; Future    2. Congenital spondylolisthesis  -     MRI Lumbar Spine Without Contrast; Future  -     XR Spine Lumbar Complete With Flex & Ext; Future    3. DDD (degenerative disc disease), lumbar    4. Scoliosis of lumbar spine, unspecified scoliosis type  -     XR Spine Scoliosis 2 or 3 Views; Future    5. Osteopenia of lumbar spine  -     DEXA Bone Density Axial; Future    Other orders  -     gabapentin (NEURONTIN) 300 MG capsule; Take 1 capsule by mouth 2 (Two) Times a Day. Start nightly for a week and then titrate to bid dosing  Dispense: 60 capsule; Refill: 0  -     dexAMETHasone (DECADRON) 1.5 MG tablet; Take 1 tablet by mouth Take As Directed. Day 1 take 3 am, 3 pm ,Day 2 take 3 am, 2 pm,Day 3 take 3 am, 2 pm,Day 4 take 2 am, 2 pm  ,Day 5 take 2 am, 2 pm,Day 6 take 2 am, 1 pm,Day 7 take 2 am, 1 pm, Day 8 take 1 am, 1 pm,Day 9 take 1 am, 1 pm,Day 10 take 1 am   Dispense: 35 tablet; Refill: 0      Return for Recheck.    This patient was examined wearing appropriate personal protective equipment.     Bernice Barrow  reports that she has been smoking cigarettes. She started smoking about 3 years ago. She has a 0.75 pack-year smoking history. She has never used smokeless tobacco.. I have educated her on the risk of diseases from using tobacco products such as cancer, COPD, and heart disease.     I advised her to quit and she is not willing to quit.    I spent 3  minutes counseling the patient.         Body mass index is 27.02 kg/m².         Patient's blood pressure was reviewed.  Recommendations for  a low-salt diet and exercise to maintain/improve BP in addition to taking any presribed medications.    Advance Care Planning   ACP discussion was held with the patient during this visit. Patient has an advance directive (not in EMR), copy requested.         Stormy Oneil, APRN  01/26/24  13:43 EST

## 2024-01-26 ENCOUNTER — TELEPHONE (OUTPATIENT)
Dept: NEUROSURGERY | Facility: CLINIC | Age: 69
End: 2024-01-26

## 2024-01-26 ENCOUNTER — OFFICE VISIT (OUTPATIENT)
Dept: NEUROSURGERY | Facility: CLINIC | Age: 69
End: 2024-01-26
Payer: MEDICARE

## 2024-01-26 VITALS
WEIGHT: 172.5 LBS | DIASTOLIC BLOOD PRESSURE: 98 MMHG | HEIGHT: 67 IN | SYSTOLIC BLOOD PRESSURE: 158 MMHG | BODY MASS INDEX: 27.07 KG/M2 | HEART RATE: 82 BPM

## 2024-01-26 DIAGNOSIS — M51.36 DDD (DEGENERATIVE DISC DISEASE), LUMBAR: ICD-10-CM

## 2024-01-26 DIAGNOSIS — Q76.2 CONGENITAL SPONDYLOLISTHESIS: ICD-10-CM

## 2024-01-26 DIAGNOSIS — M41.9 SCOLIOSIS OF LUMBAR SPINE, UNSPECIFIED SCOLIOSIS TYPE: ICD-10-CM

## 2024-01-26 DIAGNOSIS — M85.88 OSTEOPENIA OF LUMBAR SPINE: ICD-10-CM

## 2024-01-26 DIAGNOSIS — G95.9 CERVICAL MYELOPATHY: Primary | ICD-10-CM

## 2024-01-26 PROBLEM — M51.369 DDD (DEGENERATIVE DISC DISEASE), LUMBAR: Status: ACTIVE | Noted: 2024-01-26

## 2024-01-26 RX ORDER — DEXAMETHASONE 1.5 MG/1
1.5 TABLET ORAL TAKE AS DIRECTED
Qty: 35 TABLET | Refills: 0 | Status: SHIPPED | OUTPATIENT
Start: 2024-01-26

## 2024-01-26 RX ORDER — GABAPENTIN 300 MG/1
300 CAPSULE ORAL 2 TIMES DAILY
Qty: 60 CAPSULE | Refills: 0 | Status: SHIPPED | OUTPATIENT
Start: 2024-01-26

## 2024-01-26 NOTE — TELEPHONE ENCOUNTER
Patient called asking how to take her new medication ( DECADRON) she picked up. I informed her I would send her the instructions through my chart she sad that is fine.

## 2024-01-29 ENCOUNTER — TELEPHONE (OUTPATIENT)
Dept: FAMILY MEDICINE CLINIC | Facility: CLINIC | Age: 69
End: 2024-01-29

## 2024-01-29 NOTE — TELEPHONE ENCOUNTER
Caller: Bernice Barrow    Relationship: Self    Best call back number: [phone]     What was the call regarding: PATIENT STATED THAT SHE WAS IN THE ER ON 01/22/2024 WITH BACK ISSUES. PATIENT STATED THAT SHE WAS REFERRED TO A NEUROSURGEON WITHIN [facility]. PLEASE ADVISE.

## 2024-02-09 DIAGNOSIS — M85.88 OSTEOPENIA OF LUMBAR SPINE: ICD-10-CM

## 2024-02-09 DIAGNOSIS — Q76.2 CONGENITAL SPONDYLOLISTHESIS: ICD-10-CM

## 2024-02-09 DIAGNOSIS — G95.9 CERVICAL MYELOPATHY: ICD-10-CM

## 2024-02-10 DIAGNOSIS — G95.9 CERVICAL MYELOPATHY: ICD-10-CM

## 2024-02-16 ENCOUNTER — TELEPHONE (OUTPATIENT)
Dept: NEUROSURGERY | Facility: CLINIC | Age: 69
End: 2024-02-16
Payer: MEDICARE

## 2024-02-16 RX ORDER — GABAPENTIN 100 MG/1
CAPSULE ORAL
Qty: 30 CAPSULE | Refills: 0 | Status: SHIPPED | OUTPATIENT
Start: 2024-02-16

## 2024-02-20 ENCOUNTER — HOSPITAL ENCOUNTER (OUTPATIENT)
Dept: GENERAL RADIOLOGY | Facility: HOSPITAL | Age: 69
Discharge: HOME | End: 2024-02-20
Payer: MEDICARE

## 2024-02-20 ENCOUNTER — TELEPHONE (OUTPATIENT)
Dept: NEUROSURGERY | Facility: CLINIC | Age: 69
End: 2024-02-20

## 2024-02-20 DIAGNOSIS — Q76.2 CONGENITAL SPONDYLOLISTHESIS: ICD-10-CM

## 2024-02-20 DIAGNOSIS — M41.9 SCOLIOSIS OF LUMBAR SPINE, UNSPECIFIED SCOLIOSIS TYPE: ICD-10-CM

## 2024-02-20 PROCEDURE — 72082 X-RAY EXAM ENTIRE SPI 2/3 VW: CPT

## 2024-02-20 NOTE — TELEPHONE ENCOUNTER
Called patient back and told her that we will need to cancel her appointment today as Stormy will need to review the X-Rays she completes at [facility] today and call her back regarding who we need to reschedule her with,either herself or Dr Bajwa. She understood.

## 2024-02-20 NOTE — TELEPHONE ENCOUNTER
Called patient and asked her to please go over to [facility] to get her X-Rays before her appointment  this morning. She stated that she would.

## 2024-02-21 ENCOUNTER — OFFICE VISIT (OUTPATIENT)
Dept: PODIATRY | Facility: CLINIC | Age: 69
End: 2024-02-21
Payer: MEDICARE

## 2024-02-21 VITALS
HEART RATE: 84 BPM | OXYGEN SATURATION: 98 % | WEIGHT: 184.4 LBS | BODY MASS INDEX: 28.94 KG/M2 | RESPIRATION RATE: 16 BRPM | HEIGHT: 67 IN

## 2024-02-21 DIAGNOSIS — M79.672 BILATERAL FOOT PAIN: Primary | ICD-10-CM

## 2024-02-21 DIAGNOSIS — M19.072 ARTHRITIS OF MIDTARSAL JOINT OF LEFT FOOT: ICD-10-CM

## 2024-02-21 DIAGNOSIS — M19.071 ARTHRITIS OF MIDTARSAL JOINT OF RIGHT FOOT: ICD-10-CM

## 2024-02-21 DIAGNOSIS — R60.0 EDEMA OF RIGHT LOWER EXTREMITY: ICD-10-CM

## 2024-02-21 DIAGNOSIS — M21.861 ACQUIRED POSTERIOR EQUINUS OF BOTH LOWER EXTREMITIES: ICD-10-CM

## 2024-02-21 DIAGNOSIS — M79.671 BILATERAL FOOT PAIN: Primary | ICD-10-CM

## 2024-02-21 DIAGNOSIS — M21.961 FOOT DEFORMITY, RIGHT: ICD-10-CM

## 2024-02-21 DIAGNOSIS — M21.862 ACQUIRED POSTERIOR EQUINUS OF BOTH LOWER EXTREMITIES: ICD-10-CM

## 2024-02-21 NOTE — PROGRESS NOTES
"02/21/2024  Foot and Ankle Surgery - New Patient   Provider: Dr. Sanjiv iY DPM  Location: [facility]    Subjective:  Bernice Barrow is a 69 y.o. female.     Chief Complaint   Patient presents with    Right Foot - Pain, Initial Evaluation    Right Ankle - Pain    Left Ankle - Pain    Left Foot - Pain    Initial Evaluation     MONAESubha Arellano aprn 1/18/2024    Edema       HPI: The patient is a 69-year-old female who presents to the clinic for right foot pain. She is accompanied by an adult male.    She reports she has been seeing Dr. Montano. She states she has extreme pain in her right foot and swelling. She localizes the pain is in her right ankle. She reports she fractured her toe in the past and underwent surgery by Dr. Montano; however, it never \"took.\" She states there is a screw in her toe. She reports she obtained x-rays today. The patient reports she has a high pain tolerance. She denies being diabetic. She denies problems with her left foot; however, states they decided to go ahead and do her left foot as well. She reports she has had surgery on her bilateral feet for heel spurs. She states she has had injections in her right foot in the past; however, it only lasted so long. She denies a history of heart issues or kidney issues. She denies a history of autoimmune issues. She reports she has arthritis. She states she has had bilateral knee arthroplasties. She notes she is unable to ambulate. The adult male states the patient is contemplating a trip to [place] in 05/2024; however, they would like to wait until 03/10/2024 to have surgery.    Allergies   Allergen Reactions    Hydrocodone Nausea And Vomiting    Codeine GI Intolerance       Past Medical History:   Diagnosis Date    Arthritis     Bronchitis     h/o    Difficulty walking     Hammer toe     Hypertension     Low back pain     Stress fracture     Thyroid disease        Past Surgical History:   Procedure Laterality Date    COLONOSCOPY   "    CORRECTION HAMMER TOE      FOOT FRACTURE SURGERY      FOOT SURGERY Bilateral     heel spurs-- and rt  foot fx    REPLACEMENT TOTAL KNEE Right         REPLACEMENT TOTAL KNEE Left            Family History   Problem Relation Age of Onset    Arthritis Mother     Depression Mother     Hyperlipidemia Mother     Hypertension Mother     Arthritis Father     Asthma Father     Cancer Father         Esophageal Ca    Early death Maternal Grandfather         Heart attack at 28 yrs old    Alcohol abuse Paternal Uncle     COPD Sister     Depression Sister     Drug abuse Sister        Social History     Socioeconomic History    Marital status:    Tobacco Use    Smoking status: Some Days     Packs/day: 0.25     Years: 3.00     Additional pack years: 0.00     Total pack years: 0.75     Types: Cigarettes     Start date: 2021     Last attempt to quit: 2022     Years since quittin.1    Smokeless tobacco: Never    Tobacco comments:     Off and on   Vaping Use    Vaping Use: Former   Substance and Sexual Activity    Alcohol use: Not Currently     Comment: ocassional    Drug use: Never    Sexual activity: Not Currently     Partners: Male     Birth control/protection: Other, Post-menopausal, Hysterectomy        Current Outpatient Medications on File Prior to Visit   Medication Sig Dispense Refill    Boling Thyroid 90 MG tablet Take 1 tablet by mouth Daily. 90 tablet 1    dexAMETHasone (DECADRON) 1.5 MG tablet Take 1 tablet by mouth Take As Directed. Day 1 take 3 am, 3 pm ,Day 2 take 3 am, 2 pm,Day 3 take 3 am, 2 pm,Day 4 take 2 am, 2 pm  ,Day 5 take 2 am, 2 pm,Day 6 take 2 am, 1 pm,Day 7 take 2 am, 1 pm, Day 8 take 1 am, 1 pm,Day 9 take 1 am, 1 pm,Day 10 take 1 am 35 tablet 0    dilTIAZem (TIAZAC) 180 MG 24 hr capsule Take 1 capsule by mouth Daily. 90 capsule 1    FLUoxetine (PROzac) 20 MG capsule Take 2 capsules by mouth Daily.      gabapentin (NEURONTIN) 100 MG capsule Take 1 po QHS x 5 days then may  "increase to 1 po BID 30 capsule 0    hydrALAZINE (APRESOLINE) 25 MG tablet Take 1 tablet by mouth 2 (Two) Times a Day.      hydroCHLOROthiazide (HYDRODIURIL) 25 MG tablet Take 1 tablet by mouth Daily.      hydrOXYzine (ATARAX) 25 MG tablet Take 1 tablet by mouth At Night As Needed for Anxiety. 30 tablet 1    lansoprazole (PREVACID) 15 MG capsule Take 1 capsule by mouth Daily. As needed      meloxicam (MOBIC) 15 MG tablet Take 1 tablet by mouth Daily.      tiZANidine (ZANAFLEX) 4 MG tablet Take 1 tablet by mouth At Night As Needed for Muscle Spasms. 10 tablet 0    traMADol (ULTRAM) 50 MG tablet Take 1 tablet by mouth Every 6 (Six) Hours As Needed for Moderate Pain. 12 tablet 0     No current facility-administered medications on file prior to visit.       Review of Systems:  General: Denies fever, chills, fatigue, and weakness.  Eyes: Denies vision loss, blurry vision, and excessive redness.  ENT: Denies hearing issues and difficulty swallowing.  Cardiovascular: Denies palpitations, chest pain, or syncopal episodes.  Respiratory: Denies shortness of breath, wheezing, and coughing.  GI: Denies abdominal pain, nausea, and vomiting.   : Denies frequency, hematuria, and urgency.  Musculoskeletal: Denies muscle cramps, joint pains, and stiffness.  Derm: Denies rash, open wounds, or suspicious lesions.  Neuro: Denies headaches, numbness, loss of coordination, and tremors.  Psych: Denies anxiety and depression.  Endocrine: Denies temperature intolerance and changes in appetite.  Heme: Denies bleeding disorders or abnormal bruising.     Objective   Pulse 84   Resp 16   Ht 170.2 cm (67\")   Wt 83.6 kg (184 lb 6.4 oz)   SpO2 98%   BMI 28.88 kg/m²     Foot/Ankle Exam    GENERAL  Orientation:  AAOx3  Affect:  appropriate    VASCULAR     Right Foot Vascularity   Normal vascular exam    Dorsalis pedis:  2+  Posterior tibial:  2+  Skin temperature:  warm  Edema grading:  None  CFT:  < 3 seconds  Pedal hair growth:  " Present  Varicosities:  none     Left Foot Vascularity   Normal vascular exam    Dorsalis pedis:  2+  Posterior tibial:  2+  Skin temperature:  warm  Edema grading:  None  CFT:  < 3 seconds  Pedal hair growth:  Present  Varicosities:  none     NEUROLOGIC     Right Foot Neurologic   Light touch sensation: normal  Hot/Cold sensation: normal  Achilles reflex:  2+     Left Foot Neurologic   Light touch sensation: normal  Hot/Cold sensation:  normal  Achilles reflex:  2+    MUSCULOSKELETAL     Right Foot Musculoskeletal   Arch:  Normal     Left Foot Musculoskeletal   Arch:  Normal    MUSCLE STRENGTH     Right Foot Muscle Strength   Normal strength    Foot dorsiflexion:  5  Foot plantar flexion:  5  Foot inversion:  5  Foot eversion:  5     Left Foot Muscle Strength   Normal strength    Foot dorsiflexion:  5  Foot plantar flexion:  5  Foot inversion:  5  Foot eversion:  5    DERMATOLOGIC      Right Foot Dermatologic   Skin  Right foot skin is intact.   Nails comment:  Nails 1-5     Left Foot Dermatologic   Skin  Left foot skin is intact.   Nails comment:  Nails 1-5    TESTS     Right Foot Tests   Anterior drawer: negative  Varus tilt: negative     Left Foot Tests   Anterior drawer: negative  Varus tilt: negative     Right foot additional comments: Significant pes planus foot structure and prominent rigidity involving the right foot. Bony prominence noted to various aspects of the dorsal aspect of the forefoot and midfoot. Diffuse discomfort with palpation. Swelling involving the right lower extremity. Moderate equinus contracture with knee extended and flexed.      Assessment & Plan   Diagnoses and all orders for this visit:    1. Bilateral foot pain (Primary)  -     XR Foot 3+ View Bilateral  -     XR Ankle 3+ View Bilateral    2. 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 Edema of right lower extremity    4. Foot deformity, right    5. Arthritis of  midtarsal joint of left foot  -     XR Foot 3+ View Bilateral  -     XR Ankle 3+ View Bilateral    6. Acquired posterior equinus of both lower extremities        The patient is a 69-year-old female who presents to the office today for evaluation of right foot pain.  Patient has seen outside podiatrist and underwent previous surgeries involving her right foot.  She complains of significant discomfort involving various aspects of the right foot as well as swelling to the lower leg.  She does have mild discomfort involving the left foot but states that this is manageable.  Imaging was obtained and independently reviewed showing significant destructive changes involving the right midfoot.  She has evidence of previous forefoot surgery with retained hardware.  Clinically, she has significant rigid contracture involving the second digit with dorsiflexion of the second metatarsal head region.  She has prominent soft tissue rigidity involving the foot and lower leg.  I reviewed the imaging, diagnosis, and treatment options with her at length.  She has end-stage degenerative changes involving the foot.  Options are quite limited from a conservative standpoint that would include steroid injection therapy and activity modification.  We also discussed surgical options including massive reconstruction including multiple joint arthrodesis as well as consideration for deep peroneal nerve transection.  I discussed the benefits and risks of both surgical options.  Patient states that she does not want to consider any type of reconstruction.  I did review the procedure, risk, goals, and recovery for the nerve transection.  She states that she would like to discuss with her  and follow-up with me in 4 weeks for further planning.  I do feel this is appropriate.  We did discuss accommodative inserts which may help with day-to-day activity.  I have asked that she avoid high-impact and excessive activity.  We reviewed RICE therapy  and proper use of OTC anti-inflammatories.  Patient is to return in 4 weeks for reevaluation.  Greater than 45 minutes spent before, during, and after evaluation for patient care.    Orders Placed This Encounter   Procedures    XR Foot 3+ View Bilateral     Order Specific Question:   Reason for Exam:     Answer:   dano foot pain edema rm 9 wb     Order Specific Question:   Does this patient have a diabetic monitoring/medication delivering device on?     Answer:   No     Order Specific Question:   Release to patient     Answer:   Routine Release [[id number]]    XR Ankle 3+ View Bilateral     Order Specific Question:   Reason for Exam:     Answer:   rm 9  wb ankle pain dano edema     Order Specific Question:   Does this patient have a diabetic monitoring/medication delivering device on?     Answer:   No     Order Specific Question:   Release to patient     Answer:   Routine Release [[id number]]        Note is dictated utilizing voice recognition software. Unfortunately this leads to occasional typographical errors. I apologize in advance if the situation occurs. If questions occur please do not hesitate to call our office.    Transcribed from ambient dictation for KRIS Yi DPM by Sebas Woods.  02/21/24   11:26 EST    Patient or patient representative verbalized consent to the visit recording.  I have personally performed the services described in this document as transcribed by the above individual, and it is both accurate and complete.

## 2024-02-22 ENCOUNTER — PATIENT ROUNDING (BHMG ONLY) (OUTPATIENT)
Dept: ORTHOPEDIC SURGERY | Facility: CLINIC | Age: 69
End: 2024-02-22
Payer: MEDICARE

## 2024-02-22 NOTE — PROGRESS NOTES
A my chart message has been sent to the patient for PATIENT ROUNDING with [facility] [place]

## 2024-02-23 NOTE — TELEPHONE ENCOUNTER
Caller: Bernice Barrow    Relationship to patient: Self    Best call back number: [phone]    Patient is needing: PATIENT CALLED, PATIENT COMPLETED XR ON 02/20/24 @ , PATIENT ALSO COMPLETED ANOTHER XR @ [facility], PATIENT HAS CD. PATIENT WOULD LIKE TO KNOW WHAT THE NEXT STEPS SHOULD BE. PLEASE CALL PATIENT TO ADVISE.    THANK YOU

## 2024-02-23 NOTE — PROGRESS NOTES
"Subjective   History of Present Illness: Bernice Barrow is a 69 y.o. female is here today for follow-up for low back pain and leg pain.  She was seen and evaluated with myself 1/26/2024 with acute on chronic back and leg pain and paresthesias.  Patient was noted to have some chronic pars defects at L5-S1 and her symptoms seem to correlate with her imaging.  She was found objectively myelopathic after complaining of some balance issues and previous neck pain and cervical imaging was ordered as well.  Patient reports no acute neurologic complaints.  While she has had some neck pain and hand symptoms in the past, she is not experiencing any currently.  She also feels that her back and leg symptoms have improved as well.      History of Present Illness    The following portions of the patient's history were reviewed and updated as appropriate: allergies, current medications, past family history, past medical history, past social history, past surgical history, and problem list.    Review of Systems   Constitutional:  Positive for activity change.   HENT: Negative.     Eyes: Negative.    Respiratory: Negative.     Cardiovascular:  Positive for leg swelling (right foot/pain).   Gastrointestinal: Negative.    Endocrine: Negative.    Genitourinary: Negative.    Musculoskeletal:  Positive for arthralgias, back pain and myalgias.   Skin: Negative.    Allergic/Immunologic: Negative.    Neurological: Negative.    Psychiatric/Behavioral: Negative.     All other systems reviewed and are negative.      Objective     ./88   Pulse 87   Ht 170.2 cm (67\")   Wt 80.5 kg (177 lb 8 oz)   BMI 27.80 kg/m²    Body mass index is 27.8 kg/m².    Vitals:    02/27/24 1050   PainSc:   2          Physical Exam  Neurologic Exam  Left positive Lori  Bilateral patellar reflexes 4+  Bilateral upper extremity reflexes, triceps 3+, biceps 3+, brachioradialis 2+.      Assessment & Plan   Independent Review of Radiographic Studies:    "   I personally reviewed the images from the following studies.    DEXA scan 2/9/2024    T-score -1.5 lumbar spine    Lumbar MRI 2/9/2024    Bilateral pars defect L5-S1 with moderate to severe bilateral foraminal narrowing.  There is also some severe foraminal narrowing right over left at L4-L5 and on the right at L3-L4 no significant spinal canal narrowing throughout.  There is an S-shaped scoliotic curvature also noted.    Lumbar x-rays with flexion-extension 2/9/2024    Anterior listhesis at L5-S1 secondary to pars defects that does appear to increase slightly on flexion maneuvers.    Cervical MRI 2/9/2024    Spondylolisthesis at C4-C5 with moderate to severe canal narrowing.  No cord compression or cord signal change noted.    Cervical x-rays with flexion-extension    Advanced spondylitic changes at C4-C5.  There is a spondylolisthesis at C4 with no significant movement on flexion-extension imaging.    Scoliosis imaging 2/20/2024    Mild positive sagittal imbalance proximately 2.7 cm.    Medical Decision Making:      Bernice Zabala a 69 y.o. female following up today on her back and leg symptoms as well as objective myelopathy.  Patient's clinical presentation is stable to improved especially with her back and leg symptoms.  Imaging has been reviewed demonstrating  pars defects at L5-S1 with severe foraminal narrowing at L5-S1.  I do feel she has a little bit of increase of the subluxation on flexion imaging and this may indicate that she is starting to become a little unstable.  I do feel that this is where most of her symptoms are coming from.  Given her symptoms have only been going on for a month and now improving, I would like to send her for a diagnostic and therapeutic TFESI at L5-S1.  She does have a spinal deformity with mild sagittal imbalance but patient fairly asymptomatic in regards to the symptoms.  In regards to her cervical spine and her myelopathy.  She does have some severe narrowing of  her spinal canal C4-C5 but no overt cord compression or cord signal abnormality seen.  I do not feel she has any instability on flexion-extension imaging.  She is clinically asymptomatic at this time but does continue with objective myelopathic findings.  I feel that this is something that we need to keep a close eye on for any worsening as she may need surgery for her neck to prevent any worsening of the stenosis.  She is wanting to handle everything nonsurgical as possible for now and I agree based on acuity of symptoms. She also has agreed to a course of aquatic therapy for core strengthening which I do believe is a very good idea.   Encouraged her to call the office with any worsening or new neurologic symptoms.  If she does need to follow-up she will need to follow-up with Dr. Bajwa for surgical consultation.  Patient agrees to plan of care and wishes to proceed.        Diagnoses and all orders for this visit:    1. Congenital spondylolisthesis (Primary)  -     Cancel: Epidural Block  -     Cancel: Ambulatory Referral to Physical Therapy Aquatics  -     Epidural Block  -     Ambulatory Referral to Physical Therapy Aquatics    2. DDD (degenerative disc disease), lumbar  -     Cancel: Epidural Block  -     Cancel: Ambulatory Referral to Physical Therapy Aquatics  -     Epidural Block  -     Ambulatory Referral to Physical Therapy Aquatics    3. Cervical myelopathy    4. Spinal stenosis in cervical region      Return if symptoms worsen or fail to improve.    This patient was examined wearing appropriate personal protective equipment.     Bernice Barrow  reports that she has been smoking cigarettes. She started smoking about 3 years ago. She has a 0.75 pack-year smoking history. She has never used smokeless tobacco.. I have educated her on the risk of diseases from using tobacco products such as cancer, COPD, and heart disease.     I advised her to quit and she is not willing to quit.    I spent 3  minutes  counseling the patient.         Body mass index is 27.8 kg/m².      Patient's blood pressure was reviewed.  Recommendations for  a low-salt diet and exercise to maintain/improve BP in addition to taking any presribed medications.    Advance Care Planning   ACP discussion was held with the patient during this visit. Patient has an advance directive (not in EMR), copy requested.         Stormy Oneil, OLMAN  02/27/24  11:50 EST

## 2024-02-27 ENCOUNTER — OFFICE VISIT (OUTPATIENT)
Dept: NEUROSURGERY | Facility: CLINIC | Age: 69
End: 2024-02-27
Payer: MEDICARE

## 2024-02-27 VITALS
WEIGHT: 177.5 LBS | DIASTOLIC BLOOD PRESSURE: 88 MMHG | SYSTOLIC BLOOD PRESSURE: 139 MMHG | BODY MASS INDEX: 27.86 KG/M2 | HEIGHT: 67 IN | HEART RATE: 87 BPM

## 2024-02-27 DIAGNOSIS — M48.02 SPINAL STENOSIS IN CERVICAL REGION: ICD-10-CM

## 2024-02-27 DIAGNOSIS — M51.36 DDD (DEGENERATIVE DISC DISEASE), LUMBAR: ICD-10-CM

## 2024-02-27 DIAGNOSIS — G95.9 CERVICAL MYELOPATHY: ICD-10-CM

## 2024-02-27 DIAGNOSIS — Q76.2 CONGENITAL SPONDYLOLISTHESIS: Primary | ICD-10-CM

## 2024-02-27 PROCEDURE — 99214 OFFICE O/P EST MOD 30 MIN: CPT | Performed by: NURSE PRACTITIONER

## 2024-02-27 PROCEDURE — 1159F MED LIST DOCD IN RCRD: CPT | Performed by: NURSE PRACTITIONER

## 2024-02-27 PROCEDURE — 1160F RVW MEDS BY RX/DR IN RCRD: CPT | Performed by: NURSE PRACTITIONER

## 2024-02-29 DIAGNOSIS — M54.16 LUMBAR RADICULITIS: Primary | ICD-10-CM

## 2024-03-04 ENCOUNTER — HOSPITAL ENCOUNTER (OUTPATIENT)
Dept: PAIN MEDICINE | Facility: HOSPITAL | Age: 69
Discharge: HOME | End: 2024-03-04
Payer: MEDICARE

## 2024-03-04 VITALS
TEMPERATURE: 97.3 F | WEIGHT: 177 LBS | BODY MASS INDEX: 27.78 KG/M2 | SYSTOLIC BLOOD PRESSURE: 141 MMHG | HEART RATE: 81 BPM | HEIGHT: 67 IN | DIASTOLIC BLOOD PRESSURE: 90 MMHG | RESPIRATION RATE: 16 BRPM | OXYGEN SATURATION: 95 %

## 2024-03-04 DIAGNOSIS — M54.16 LUMBAR RADICULITIS: Primary | ICD-10-CM

## 2024-03-04 DIAGNOSIS — R52 PAIN: ICD-10-CM

## 2024-03-04 PROCEDURE — 25010000002 BUPIVACAINE (PF) 0.25 % SOLUTION: Performed by: ANESTHESIOLOGY

## 2024-03-04 PROCEDURE — 77003 FLUOROGUIDE FOR SPINE INJECT: CPT

## 2024-03-04 PROCEDURE — 64483 NJX AA&/STRD TFRM EPI L/S 1: CPT | Performed by: ANESTHESIOLOGY

## 2024-03-04 PROCEDURE — 64484 NJX AA&/STRD TFRM EPI L/S EA: CPT | Performed by: ANESTHESIOLOGY

## 2024-03-04 PROCEDURE — 25010000002 DEXAMETHASONE SODIUM PHOSPHATE 10 MG/ML SOLUTION: Performed by: ANESTHESIOLOGY

## 2024-03-04 PROCEDURE — 25510000001 IOPAMIDOL 41 % SOLUTION: Performed by: ANESTHESIOLOGY

## 2024-03-04 RX ORDER — IOPAMIDOL 408 MG/ML
3 INJECTION, SOLUTION INTRATHECAL
Status: COMPLETED | OUTPATIENT
Start: 2024-03-04 | End: 2024-03-04

## 2024-03-04 RX ORDER — BUPIVACAINE HYDROCHLORIDE 2.5 MG/ML
10 INJECTION, SOLUTION EPIDURAL; INFILTRATION; INTRACAUDAL ONCE
Status: COMPLETED | OUTPATIENT
Start: 2024-03-04 | End: 2024-03-04

## 2024-03-04 RX ORDER — DEXAMETHASONE SODIUM PHOSPHATE 10 MG/ML
10 INJECTION, SOLUTION INTRAMUSCULAR; INTRAVENOUS ONCE
Status: COMPLETED | OUTPATIENT
Start: 2024-03-04 | End: 2024-03-04

## 2024-03-04 RX ADMIN — IOPAMIDOL 3 ML: 408 INJECTION, SOLUTION INTRATHECAL at 09:07

## 2024-03-04 RX ADMIN — BUPIVACAINE HYDROCHLORIDE 5 ML: 2.5 INJECTION, SOLUTION EPIDURAL; INFILTRATION; INTRACAUDAL; PERINEURAL at 09:07

## 2024-03-04 RX ADMIN — DEXAMETHASONE SODIUM PHOSPHATE 10 MG: 10 INJECTION, SOLUTION INTRAMUSCULAR; INTRAVENOUS at 09:07

## 2024-03-04 NOTE — PROCEDURES
"Subjective   CC back pain  Bernice Barrow is a 69 y.o. female with lumbar radiculitis here for left L4, L5 TFESI. No anticoagulation    Pain Assessment   Location of Pain: Lower Back, R Hip, L Hip, L Leg, neck pain, joint  Description of Pain: Dull/Aching, Throbbing, Stabbing  Previous Pain Rating :7  Current Pain Ratin  Aggravating Factors: Activity  Alleviating Factors: Rest, Medication  Pain onset over 12 weeks  Pain interferes with ADL's    The following portions of the patient's history were reviewed and updated as appropriate: allergies, current medications, past family history, past medical history, past social history, past surgical history and problem list.      Review of Systems  As in HPI  Objective   Physical Exam  Vitals reviewed.   Constitutional:       General: She is not in acute distress.  Pulmonary:      Effort: Pulmonary effort is normal.       /82 (BP Location: Left arm, Patient Position: Sitting)   Pulse 87   Temp 97.3 °F (36.3 °C) (Skin)   Resp 16   Ht 170.2 cm (67\")   Wt 80.3 kg (177 lb)   SpO2 96%   BMI 27.72 kg/m²     Assessment & Plan    underwent left L4-L5 transforaminal YASH.    RTC 4-6 weeks or as needed for repeat    DATE OF PROCEDURE:  3/4/2024    PREOPERATIVE DIAGNOSIS:   Lumbar radiculitis    POSTOPERATIVE DIAGNOSIS: Same    PROCEDURE PERFORMED: Left L4, L5  Transforaminal Epidural     The patient presents with a history of  lumbar degenerative disc disease with lumbosacral neuritis in the left leg at level [ L4,  L5].  The patient presents today for a transforaminal epidural. The patient understands the risks and benefits of the procedure and wishes to proceed. The patient was seen in the preoperative area.  Patient's consent was obtained and updated.  Vitals were taken.  Patient was then brought to the procedure suite and placed in a prone position. Noninvasive monitoring per routine anesthesia protocol was placed.  The appropriate anatomic area was widely " prepped with Chloroprep and draped in a sterile fashion.  Under fluoroscopic guidance using an AP and lateral view, a 22 guage curved tip spinal needle  was passed through skin anesthetized with 1% Lidocaine without epinephrine. The needle tip was advanced to the inferior medial aspect of the transverse process and carefully walked into the neuroforamin using an AP lateral view.  At no time were parathesias elicited.  At this point 2 mL of a solution containing  1 mL of 0.25% bupivacaine and 1 mL of 10 mg Decadron were injected.  Clear epidural spread using 0.25 mL of  preservative free contrast was obtained.  A sterile dressing was placed over the puncture site.    The patient tolerated the procedure with no complications . They were then brought to the post procedure area where they recovered nicely.    Discharge:  The patient will be discharged home in stable condition.   Patient understands to contact the Center with any post procedure questions or concerns.  Discharge instructions given by nursing staff.     O-T Advancement Flap Text: The defect edges were debeveled with a #15 scalpel blade.  Given the location of the defect, shape of the defect and the proximity to free margins an O-T advancement flap was deemed most appropriate.  Using a sterile surgical marker, an appropriate advancement flap was drawn incorporating the defect and placing the expected incisions within the relaxed skin tension lines where possible.    The area thus outlined was incised deep to adipose tissue with a #15 scalpel blade.  The skin margins were undermined to an appropriate distance in all directions utilizing iris scissors.

## 2024-03-04 NOTE — DISCHARGE INSTRUCTIONS

## 2024-03-05 ENCOUNTER — TELEPHONE (OUTPATIENT)
Dept: PAIN MEDICINE | Facility: HOSPITAL | Age: 69
End: 2024-03-05
Payer: MEDICARE

## 2024-03-05 NOTE — TELEPHONE ENCOUNTER
Post procedure phone call completed.  Pt states they are doing good and denies questions or concerns. Pain is at a 1 this morning.

## 2024-03-11 ENCOUNTER — TREATMENT (OUTPATIENT)
Dept: PHYSICAL THERAPY | Facility: CLINIC | Age: 69
End: 2024-03-11
Payer: MEDICARE

## 2024-03-11 DIAGNOSIS — M54.41 CHRONIC BILATERAL LOW BACK PAIN WITH RIGHT-SIDED SCIATICA: ICD-10-CM

## 2024-03-11 DIAGNOSIS — M51.36 DDD (DEGENERATIVE DISC DISEASE), LUMBAR: ICD-10-CM

## 2024-03-11 DIAGNOSIS — G89.29 CHRONIC BILATERAL LOW BACK PAIN WITH RIGHT-SIDED SCIATICA: ICD-10-CM

## 2024-03-11 DIAGNOSIS — Q76.2 CONGENITAL SPONDYLOLISTHESIS: Primary | ICD-10-CM

## 2024-03-11 NOTE — PROGRESS NOTES
"Physical Therapy Initial Evaluation and Plan of Care      [address] [place]   [address]    Patient: Bernice Barrow   : 1955  Diagnosis/ICD-10 Code:  Congenital spondylolisthesis [Q76.2]; DDD lumbar M51.36; low back pain  M54.41  Referring practitioner: OLMAN Schwab  Date of Initial Visit: 3/11/2024  Today's Date: 3/11/2024  Patient seen for 1 sessions           Subjective Questionnaire: Oswestry: 26/50 , score indicates 52% functional impairment       Subjective Evaluation    History of Present Illness  Mechanism of injury: - Pt reported several weeks ago she began having sciatic  nerve pain at (R) LE.  Patient reported she had never had that feeling before.  Went to ER, referred to neurosurgery.  - Pt reported she has had back pain on and off but never with symptoms exactly like this episode  - Back \" gets tired\". Difficulty with standing to cook.  Patient reported she has not been driving as she has been on Gabapentin and does not like the side effects.  Patient reported she has been trying to wean herself off.             Patient Occupation: Retired - Worked in registration Pain  Current pain ratin  At best pain ratin  At worst pain ratin  Relieving factors: change in position  Aggravating factors: ambulation (worse first thing in the am, later in the evening; sitting)  Progression: improved    Social Support  Lives with: spouse    Hand dominance: right    Treatments  Treatments tried: [employer] pool in the past.  Current treatment: injection treatment  Current treatment comments: (R) side injection on Friday.   Patient Goals  Patient goals for therapy: increased strength and improved balance  Patient goal: To avoid surgery, walk more           Per Neurosurgery note 24:  Patient's clinical presentation is stable to improved especially with her back and leg symptoms.  Imaging has been reviewed demonstrating  pars defects at L5-S1 with severe foraminal narrowing at " L5-S1.  I do feel she has a little bit of increase of the subluxation on flexion imaging and this may indicate that she is starting to become a little unstable.  I do feel that this is where most of her symptoms are coming from.  Given her symptoms have only been going on for a month and now improving, I would like to send her for a diagnostic and therapeutic TFESI at L5-S1.  She does have a spinal deformity with mild sagittal imbalance but patient fairly asymptomatic in regards to the symptoms.  In regards to her cervical spine and her myelopathy.  She does have some severe narrowing of her spinal canal C4-C5 but no overt cord compression or cord signal abnormality seen.  I do not feel she has any instability on flexion-extension imaging.  She is clinically asymptomatic at this time but does continue with objective myelopathic findings.  I feel that this is something that we need to keep a close eye on for any worsening as she may need surgery for her neck to prevent any worsening of the stenosis.  She is wanting to handle everything nonsurgical as possible for now and I agree based on acuity of symptoms. She also has agreed to a course of aquatic therapy for core strengthening which I do believe is a very good idea.   Encouraged her to call the office with any worsening or new neurologic symptoms.  If she does need to follow-up she will need to follow-up with Dr. Bajwa for surgical consultation.  Patient agrees to plan of care and wishes to proceed.     PMHx:  h/o (B) TKR; arthritis of (R) foot (per patient report) ; sleep apnea/wear CPAP at night; HTN (fairly well controlled with medication ); hypothyroid    Objective        Special Questions      Additional Special Questions  - Pt denied wounds, sores, incontinence issues       Neurological Testing     Sensation     Lumbar   Left   Intact: light touch    Right   Intact: light touch    Active Range of Motion     Lumbar   Flexion: WFL    Additional Active Range of  Motion Details  (B) sidebending and extension : limited by 50%    Strength/Myotome Testing     Left Hip   Planes of Motion   Flexion: 5  Abduction: 5  External rotation: 5  Internal rotation: 5    Right Hip   Planes of Motion   Flexion: 4  Abduction: 4  External rotation: 4-  Internal rotation: 4-    Left Knee   Flexion: 5  Extension: 5    Right Knee   Flexion: 4+  Extension: 5    Muscle Activation     Additional Muscle Activation Details  Upper abs = 3/5  Lower abs = 2/5    Tests     Lumbar   Positive repeated extension.   Negative repeated flexion.     Left   Negative passive SLR.     Right   Positive passive SLR.     Left Hip   Positive piriformis.     Right Hip   Positive piriformis.     Additional Tests Details  - Standing flexion x 10 : no change in pain, did decrease buttocks pain after standing exercise  - Standing extension x 10: resulted in radiating pain into buttocks  - SKTC and DKTC: no pain   - Decompression lying with legs supported in 90/90 on black block : decreased pain/no pain in this position    Lumbar Flexibility Comments:   Impaired (B) hamstring flexibility : 20 deg of knee flexion in 90/90 position     Ambulation     Observational Gait   Gait: within functional limits     Additional Observational Gait Details  Without assistive device          Assessment & Plan       Assessment  Impairments: abnormal coordination, abnormal gait, abnormal muscle firing, abnormal muscle tone, abnormal or restricted ROM, activity intolerance, impaired balance, impaired physical strength, lacks appropriate home exercise program, pain with function and weight-bearing intolerance   Functional limitations: carrying objects, lifting, sleeping, walking, pulling, pushing, uncomfortable because of pain, moving in bed, sitting, standing and stooping   Assessment details: Patient presents with significant impairments, including pain; decreased mobility; impaired gait; impaired flexibility,  decreased trunk ROM and   trunk/LE strength.     Due to recent pain pattern and impairments, patient with difficulty and/or limitations with initiating movement after static positioning, impaired standing and ambulation tolerance    Based on the above impairments and the examination, this patient has the potential to benefit from Physical Therapy. Will initiate principles of aquatic therapy to offload spine/joints, thermal effects to reduce pain, and hydrostatic pressure to facilitate exercise with transition to land as tolerated.    Barriers to therapy: chronic pain; HTN; limited recent activity/exercise  Prognosis: good    Goals  Plan Goals: STG's (3  weeks)  1. Tolerate 45 minutes aquatic therapy with reduction in pain.   2. Able to ambulate x 10 min on Aquatic TM with improved gait pattern for carry over to land based ambulation   3. Able to tolerate initial HEP w/ progression for self management of impairments    LTG's (by d/c)  1. Independent with HEP and able to manage condition independently and continue aquatic program at [facility].  2. Patient will demonstrate improved (R) LE strength = 5/5 in all tested planes for improved postural support and to promote improved function.   3. Decrease overall pain 50% or more for improved ambulation tolerance to greater than 20 minutes  4. Significant improvement on Oswestry outcome measure indicating 20% impairment of less .      Plan  Therapy options: will be seen for skilled therapy services  Planned modality interventions: electrical stimulation/Russian stimulation, cryotherapy, thermotherapy (hydrocollator packs), traction and ultrasound  Planned therapy interventions: joint mobilization, home exercise program, gait training, functional ROM exercises, flexibility, manual therapy, neuromuscular re-education, postural training, soft tissue mobilization, spinal/joint mobilization, strengthening, stretching, therapeutic activities, abdominal trunk stabilization and body mechanics training  Other  planned therapy interventions: aquatic  Frequency: 2x week  Duration in visits: 12  Duration in weeks: 12  Treatment plan discussed with: patient        History # of Personal Factors and/or Comorbidities: MODERATE (1-2)  Examination of Body System(s): # of elements: MODERATE (3)  Clinical Presentation: EVOLVING  Clinical Decision Making: MODERATE      Timed:         Manual Therapy:    0     mins  11467;     Therapeutic Exercise:    0     mins  11838;     Neuromuscular Sharif:    0    mins  57619;    Therapeutic Activity:     0     mins  60192;     Gait Trainin     mins  28167;     Ultrasound:     0     mins  72364;    Ionto                               0    mins   06159  Self Care                       0     mins   67704  Aquatic                          0     mins 47482      Un-Timed:  Electrical Stimulation:    0     mins  71104 ( );  Dry Needling     0     mins self-pay  Traction     0     mins 99962  Low Eval     0     Mins  20715  Mod Eval     45     Mins  02851  High Eval                       0     Mins  90511  Re-Eval                           0    mins  70034        Timed Treatment:   0   mins   Total Treatment:     45   mins    PT SIGNATURE: Falguni Perez PT   [place] License#: [id number]      Electronically signed by Falguni Perez PT, 24, 1:31 PM EDT      Medicare Initial Certification  Certification Period:  3/11/2024 thru 2024  I certify that the therapy services are furnished while this patient is under my care.  The services outlined above are required by this patient, and will be reviewed every 90 days.       Physician Signature:__________________________________________________    PHYSICIAN: Stormy Oneil APRN      DATE:     Please sign and return via fax to [phone].. Thank you, [facility].

## 2024-03-15 ENCOUNTER — HOSPITAL ENCOUNTER (OUTPATIENT)
Dept: PAIN MEDICINE | Facility: HOSPITAL | Age: 69
Discharge: HOME | End: 2024-03-15
Payer: MEDICARE

## 2024-03-15 VITALS
WEIGHT: 177 LBS | BODY MASS INDEX: 27.78 KG/M2 | DIASTOLIC BLOOD PRESSURE: 77 MMHG | TEMPERATURE: 97.1 F | HEIGHT: 67 IN | RESPIRATION RATE: 12 BRPM | OXYGEN SATURATION: 98 % | SYSTOLIC BLOOD PRESSURE: 124 MMHG | HEART RATE: 80 BPM

## 2024-03-15 DIAGNOSIS — R52 PAIN: ICD-10-CM

## 2024-03-15 DIAGNOSIS — M54.16 LUMBAR RADICULITIS: Primary | ICD-10-CM

## 2024-03-15 DIAGNOSIS — G47.00 INSOMNIA, UNSPECIFIED TYPE: ICD-10-CM

## 2024-03-15 PROCEDURE — 77003 FLUOROGUIDE FOR SPINE INJECT: CPT

## 2024-03-15 PROCEDURE — 25010000002 DEXAMETHASONE SODIUM PHOSPHATE 10 MG/ML SOLUTION: Performed by: ANESTHESIOLOGY

## 2024-03-15 PROCEDURE — 25010000002 BUPIVACAINE (PF) 0.25 % SOLUTION: Performed by: ANESTHESIOLOGY

## 2024-03-15 PROCEDURE — 25510000001 IOPAMIDOL 41 % SOLUTION: Performed by: ANESTHESIOLOGY

## 2024-03-15 RX ORDER — DEXAMETHASONE SODIUM PHOSPHATE 10 MG/ML
10 INJECTION, SOLUTION INTRAMUSCULAR; INTRAVENOUS ONCE
Status: COMPLETED | OUTPATIENT
Start: 2024-03-15 | End: 2024-03-15

## 2024-03-15 RX ORDER — BUPIVACAINE HYDROCHLORIDE 2.5 MG/ML
10 INJECTION, SOLUTION EPIDURAL; INFILTRATION; INTRACAUDAL ONCE
Status: COMPLETED | OUTPATIENT
Start: 2024-03-15 | End: 2024-03-15

## 2024-03-15 RX ORDER — HYDROXYZINE HYDROCHLORIDE 25 MG/1
25 TABLET, FILM COATED ORAL NIGHTLY PRN
Qty: 30 TABLET | Refills: 1 | Status: SHIPPED | OUTPATIENT
Start: 2024-03-15

## 2024-03-15 RX ORDER — IOPAMIDOL 408 MG/ML
3 INJECTION, SOLUTION INTRATHECAL
Status: COMPLETED | OUTPATIENT
Start: 2024-03-15 | End: 2024-03-15

## 2024-03-15 RX ADMIN — DEXAMETHASONE SODIUM PHOSPHATE 10 MG: 10 INJECTION, SOLUTION INTRAMUSCULAR; INTRAVENOUS at 11:25

## 2024-03-15 RX ADMIN — IOPAMIDOL 3 ML: 408 INJECTION, SOLUTION INTRATHECAL at 11:25

## 2024-03-15 RX ADMIN — BUPIVACAINE HYDROCHLORIDE 10 ML: 2.5 INJECTION, SOLUTION EPIDURAL; INFILTRATION; INTRACAUDAL; PERINEURAL at 11:25

## 2024-03-15 NOTE — DISCHARGE INSTRUCTIONS

## 2024-03-18 ENCOUNTER — TELEPHONE (OUTPATIENT)
Dept: PAIN MEDICINE | Facility: HOSPITAL | Age: 69
End: 2024-03-18
Payer: MEDICARE

## 2024-03-18 RX ORDER — GABAPENTIN 100 MG/1
CAPSULE ORAL
Qty: 30 CAPSULE | Refills: 0 | Status: SHIPPED | OUTPATIENT
Start: 2024-03-18

## 2024-03-18 NOTE — TELEPHONE ENCOUNTER
Rx Refill Note  Requested Prescriptions     Pending Prescriptions Disp Refills    gabapentin (NEURONTIN) 100 MG capsule [Pharmacy Med Name: GABAPENTIN 100MG CAPSULES] 30 capsule 0     Sig: TAKE 1 CAPSULE BY MOUTH EVERY NIGHT AT BEDTIME FOR 5 DAYS. MAY INCREASE TO 1 BY MOUTH 2 TIMES DAILY      Last office visit with prescribing clinician: 2/27/2024   Last telemedicine visit with prescribing clinician: Visit date not found   Next office visit with prescribing clinician: Visit date not found                         Would you like a call back once the refill request has been completed: [] Yes [] No    If the office needs to give you a call back, can they leave a voicemail: [] Yes [] No    Claude Chung MA  03/18/24, 15:16 EDT

## 2024-03-19 ENCOUNTER — LAB (OUTPATIENT)
Dept: LAB | Facility: HOSPITAL | Age: 69
End: 2024-03-19
Payer: MEDICARE

## 2024-03-19 DIAGNOSIS — E03.9 HYPOTHYROIDISM, UNSPECIFIED TYPE: ICD-10-CM

## 2024-03-19 LAB
T4 FREE SERPL-MCNC: 1.07 NG/DL (ref 0.93–1.7)
TSH SERPL DL<=0.05 MIU/L-ACNC: 8.38 UIU/ML (ref 0.27–4.2)

## 2024-03-19 PROCEDURE — 36415 COLL VENOUS BLD VENIPUNCTURE: CPT

## 2024-03-19 PROCEDURE — 84439 ASSAY OF FREE THYROXINE: CPT

## 2024-03-20 DIAGNOSIS — E03.9 HYPOTHYROIDISM, UNSPECIFIED TYPE: Primary | ICD-10-CM

## 2024-03-20 RX ORDER — THYROID 15 MG/1
15 TABLET ORAL DAILY
Qty: 90 TABLET | Refills: 0 | Status: SHIPPED | OUTPATIENT
Start: 2024-03-20

## 2024-03-21 ENCOUNTER — TELEPHONE (OUTPATIENT)
Dept: PHYSICAL THERAPY | Facility: CLINIC | Age: 69
End: 2024-03-21

## 2024-03-21 NOTE — TELEPHONE ENCOUNTER
Caller: Bernice Barrow    Relationship: Self       What was the call regarding:  JUST GOT OUT OF HOSPITAL , SHE IS TO TIRED

## 2024-03-26 ENCOUNTER — TREATMENT (OUTPATIENT)
Dept: PHYSICAL THERAPY | Facility: CLINIC | Age: 69
End: 2024-03-26
Payer: MEDICARE

## 2024-03-26 ENCOUNTER — TELEPHONE (OUTPATIENT)
Dept: FAMILY MEDICINE CLINIC | Facility: CLINIC | Age: 69
End: 2024-03-26
Payer: MEDICARE

## 2024-03-26 DIAGNOSIS — M51.36 DDD (DEGENERATIVE DISC DISEASE), LUMBAR: ICD-10-CM

## 2024-03-26 DIAGNOSIS — G89.29 CHRONIC BILATERAL LOW BACK PAIN WITH RIGHT-SIDED SCIATICA: ICD-10-CM

## 2024-03-26 DIAGNOSIS — M54.41 CHRONIC BILATERAL LOW BACK PAIN WITH RIGHT-SIDED SCIATICA: ICD-10-CM

## 2024-03-26 DIAGNOSIS — Q76.2 CONGENITAL SPONDYLOLISTHESIS: Primary | ICD-10-CM

## 2024-03-26 NOTE — TELEPHONE ENCOUNTER
Caller: Bernice Barrow    Relationship: Self    Best call back number: [phone]     What medication are you requesting:   levothyroxine    What are your current symptoms:   UNDERACTIVE THYROID    How long have you been experiencing symptoms: SEVERAL YEARS    Have you had these symptoms before:    [x] Yes  [] No    Have you been treated for these symptoms before:   [x] Yes  [] No    If a prescription is needed, what is your preferred pharmacy and phone number:    [id number] - [facility] [place] - [address] [facility] [phone] [facility] [phone]      Additional notes:  REPLACE THIS WITH THYROID (ARMOUR THYROID) WHICH WILL COST LESS

## 2024-03-26 NOTE — PROGRESS NOTES
Physical Therapy Treatment  Note  [address] [place]   [address]     Diagnosis Plan   1. Congenital spondylolisthesis        2. DDD (degenerative disc disease), lumbar        3. Chronic bilateral low back pain with right-sided sciatica            VISIT#: 2    Subjective   Bernice Barrow reports: pain level as 2/10 upon arrival to therapy.  Patient rated (R) foot pain as 5/10 . Patient reported her feet giving her more problems than her back at this time.       Objective   - steps to enter pool  - lift chair to exit    See Exercise, Manual, and Modality Logs for complete treatment.     Patient Education:    Goals- Evaluation on 3/11/24  Plan Goals: STG's (3  weeks)  1. Tolerate 45 minutes aquatic therapy with reduction in pain.   2. Able to ambulate x 10 min on Aquatic TM with improved gait pattern for carry over to land based ambulation   3. Able to tolerate initial HEP w/ progression for self management of impairments     LTG's (by d/c)  1. Independent with HEP and able to manage condition independently and continue aquatic program at [facility].  2. Patient will demonstrate improved (R) LE strength = 5/5 in all tested planes for improved postural support and to promote improved function.   3. Decrease overall pain 50% or more for improved ambulation tolerance to greater than 20 minutes  4. Significant improvement on Oswestry outcome measure indicating 20% impairment of less .  Assessment/Plan    - Pt reported fatigue at conclusion of session  - Pt has difficulty ascending stairs so exited pool via lift chair for safety.  - Jets utilized for muscular relaxation and pain modulation at conclusion of session  - Treatment  incorporating  principles of aquatic therapy to offload spine/joints, thermal effects to reduce pain, and hydrostatic pressure to facilitate exercise with transition to land as tolerated.       Progress per Plan of Care            Timed:         Manual Therapy:    0     mins  38665;      Therapeutic Exercise:    0     mins  73876;     Neuromuscular Sharif:    0    mins  47809;    Therapeutic Activity:     0     mins  77204;     Gait Trainin     mins  98200;     Ultrasound:     0     mins  78636;    Ionto                               0    mins   45313  Self Care                       0     mins   08971  Canalith Repos               0    mins  4209  Aquatic                          60     mins 41234    Un-Timed:  Electrical Stimulation:    0     mins  50156 ( );  Dry Needling     0     mins self-pay  Traction     0     mins 04757  Low Eval     0     Mins  07859  Mod Eval     0     Mins  71684  High Eval                       0     Mins  35001  Re-Eval                           0    mins  49412    Timed Treatment:   60   mins   Total Treatment:     60   mins    Falguni Perez, PT PT, DPT, [id number]

## 2024-03-27 NOTE — TELEPHONE ENCOUNTER
Please advise Bernice that this would be best handled within an office visit.     **Please note, not all medical questions are appropriate to communicate through [employer]. New medical issues that require a history, performing an exam, getting labs or studies, prescribing new medications, obtaining referrals, or researching medical questions need to be addressed in the office.**

## 2024-03-28 ENCOUNTER — OFFICE VISIT (OUTPATIENT)
Dept: PODIATRY | Facility: CLINIC | Age: 69
End: 2024-03-28
Payer: MEDICARE

## 2024-03-28 VITALS
OXYGEN SATURATION: 99 % | HEART RATE: 85 BPM | HEIGHT: 67 IN | WEIGHT: 177 LBS | BODY MASS INDEX: 27.78 KG/M2 | RESPIRATION RATE: 20 BRPM

## 2024-03-28 DIAGNOSIS — M19.071 ARTHRITIS OF MIDTARSAL JOINT OF RIGHT FOOT: ICD-10-CM

## 2024-03-28 DIAGNOSIS — M19.072 ARTHRITIS OF MIDTARSAL JOINT OF LEFT FOOT: ICD-10-CM

## 2024-03-28 DIAGNOSIS — M21.961 FOOT DEFORMITY, RIGHT: ICD-10-CM

## 2024-03-28 DIAGNOSIS — M79.671 BILATERAL FOOT PAIN: Primary | ICD-10-CM

## 2024-03-28 DIAGNOSIS — R60.0 EDEMA OF RIGHT LOWER EXTREMITY: ICD-10-CM

## 2024-03-28 DIAGNOSIS — M21.861 ACQUIRED POSTERIOR EQUINUS OF BOTH LOWER EXTREMITIES: ICD-10-CM

## 2024-03-28 DIAGNOSIS — M79.672 BILATERAL FOOT PAIN: Primary | ICD-10-CM

## 2024-03-28 DIAGNOSIS — M21.862 ACQUIRED POSTERIOR EQUINUS OF BOTH LOWER EXTREMITIES: ICD-10-CM

## 2024-03-28 NOTE — PROGRESS NOTES
"03/28/2024  Foot and Ankle Surgery - Established Patient/Follow-up  Provider: Dr. Sanjiv Yi DPM  Location: [facility]    Subjective:  Bernice Barrow is a 69 y.o. female.     Chief Complaint   Patient presents with    Right Ankle - Pain, Edema, Follow-up    Right Foot - Pain, Edema, Follow-up    Follow-up     EUGENIO Arellano pcp last visit 01/18/2024       HPI: The patient is a 69-year-old female who is here for follow-up regarding her foot. She is accompanied by an adult male.    She was last seen last month, 02/2024. She did get some inserts for some of her shoes. She did get some compression stockings, but they hurt to put on. The inserts help, but it depends on how much she is on her feet.     She broke a bone and had surgery on it and a screw was put in there. They operated on the toe, but the toe never \"took.\" She has been using Voltaren gel, which really helps. The adult male states the patient can walk to the kitchen and then from the kitchen to the bathroom, her foot is hurting. She sometimes wears shoes.     She has started aquatic therapy for her back.    She is retired.    Allergies   Allergen Reactions    Hydrocodone Nausea And Vomiting    Codeine GI Intolerance       Current Outpatient Medications on File Prior to Visit   Medication Sig Dispense Refill    [place] Thyroid 90 MG tablet Take 1 tablet by mouth Daily. 90 tablet 1    dilTIAZem (TIAZAC) 180 MG 24 hr capsule Take 1 capsule by mouth Daily. 90 capsule 1    FLUoxetine (PROzac) 20 MG capsule Take 2 capsules by mouth Daily.      hydrALAZINE (APRESOLINE) 25 MG tablet Take 1 tablet by mouth 2 (Two) Times a Day.      hydroCHLOROthiazide (HYDRODIURIL) 25 MG tablet Take 1 tablet by mouth Daily.      hydrOXYzine (ATARAX) 25 MG tablet TAKE 1 TABLET BY MOUTH AT NIGHT AS NEEDED FOR ANXIETY 30 tablet 1    lansoprazole (PREVACID) 15 MG capsule Take 1 capsule by mouth Daily. As needed      meloxicam (MOBIC) 15 MG tablet Take 1 tablet by mouth " "Daily.      thyroid ([place] Thyroid) 15 MG tablet Take 1 tablet by mouth Daily. Take with the 90 mcg tablet for a total dose of 105 mcg daily - repeat lab in 6 weeks 90 tablet 0    gabapentin (NEURONTIN) 100 MG capsule TAKE 1 CAPSULE BY MOUTH EVERY NIGHT AT BEDTIME FOR 5 DAYS. MAY INCREASE TO 1 BY MOUTH 2 TIMES DAILY 30 capsule 0    tiZANidine (ZANAFLEX) 4 MG tablet Take 1 tablet by mouth At Night As Needed for Muscle Spasms. 10 tablet 0    traMADol (ULTRAM) 50 MG tablet Take 1 tablet by mouth Every 6 (Six) Hours As Needed for Moderate Pain. 12 tablet 0     No current facility-administered medications on file prior to visit.       Objective   Pulse 85   Resp 20   Ht 170.2 cm (67\")   Wt 80.3 kg (177 lb)   SpO2 99%   BMI 27.72 kg/m²     Foot/Ankle Exam    GENERAL  Orientation:  AAOx3  Affect:  appropriate    VASCULAR     Right Foot Vascularity   Normal vascular exam    Dorsalis pedis:  2+  Posterior tibial:  2+  Skin temperature:  warm  Edema grading:  None  CFT:  < 3 seconds  Pedal hair growth:  Present  Varicosities:  none     Left Foot Vascularity   Normal vascular exam    Dorsalis pedis:  2+  Posterior tibial:  2+  Skin temperature:  warm  Edema grading:  None  CFT:  < 3 seconds  Pedal hair growth:  Present  Varicosities:  none     NEUROLOGIC     Right Foot Neurologic   Light touch sensation: normal  Hot/Cold sensation: normal  Achilles reflex:  2+     Left Foot Neurologic   Light touch sensation: normal  Hot/Cold sensation:  normal  Achilles reflex:  2+    MUSCULOSKELETAL     Right Foot Musculoskeletal   Arch:  Normal     Left Foot Musculoskeletal   Arch:  Normal    MUSCLE STRENGTH     Right Foot Muscle Strength   Normal strength    Foot dorsiflexion:  5  Foot plantar flexion:  5  Foot inversion:  5  Foot eversion:  5     Left Foot Muscle Strength   Normal strength    Foot dorsiflexion:  5  Foot plantar flexion:  5  Foot inversion:  5  Foot eversion:  5    DERMATOLOGIC      Right Foot Dermatologic "   Skin  Right foot skin is intact.   Nails comment:  Nails 1-5     Left Foot Dermatologic   Skin  Left foot skin is intact.   Nails comment:  Nails 1-5    TESTS     Right Foot Tests   Anterior drawer: negative  Varus tilt: negative     Left Foot Tests   Anterior drawer: negative  Varus tilt: negative     Right foot additional comments: Significant pes planus foot structure and prominent rigidity involving the right foot. Bony prominence noted to various aspects of the dorsal aspect of the forefoot and midfoot. Diffuse discomfort with palpation. Swelling involving the right lower extremity. Moderate equinus contracture with knee extended and flexed.    03/28/2024  Continued discomfort involving the foot. No new issues or concerns.        Assessment & Plan   Diagnoses and all orders for this visit:    1. Bilateral foot pain (Primary)    2. 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 Edema of right lower extremity    4. Foot deformity, right    5. Arthritis of midtarsal joint of left foot    6. Acquired posterior equinus of both lower extremities        Patient is here for follow up on right foot pain. Last visit we discussed activity modification and management. Continued discomfort involving the foot. No new issues or concerns. We discussed surgery. I explained the purpose of the nerve and how it serves motor function to a muscle. I explained the procedure, risk and recovery at length.  I will give her Tubigrip. The patient will follow up in 3 months. Greater than 20 minutes spent before, during, and after evaluation for patient care.     No orders of the defined types were placed in this encounter.         Note is dictated utilizing voice recognition software. Unfortunately this leads to occasional typographical errors. I apologize in advance if the situation occurs. If questions occur please do not hesitate to call our office.    Transcribed from ambient dictation for KRIS Yi DPM by Sebas  Chuck.  03/28/24   11:06 EDT    Patient or patient representative verbalized consent to the visit recording.  I have personally performed the services described in this document as transcribed by the above individual, and it is both accurate and complete.

## 2024-04-11 ENCOUNTER — DOCUMENTATION (OUTPATIENT)
Dept: PHYSICAL THERAPY | Facility: CLINIC | Age: 69
End: 2024-04-11

## 2024-04-11 ENCOUNTER — OFFICE VISIT (OUTPATIENT)
Dept: FAMILY MEDICINE CLINIC | Facility: CLINIC | Age: 69
End: 2024-04-11
Payer: MEDICARE

## 2024-04-11 VITALS
DIASTOLIC BLOOD PRESSURE: 80 MMHG | SYSTOLIC BLOOD PRESSURE: 154 MMHG | RESPIRATION RATE: 20 BRPM | HEIGHT: 67 IN | BODY MASS INDEX: 27.47 KG/M2 | WEIGHT: 175 LBS | OXYGEN SATURATION: 98 % | HEART RATE: 84 BPM

## 2024-04-11 DIAGNOSIS — E03.9 HYPOTHYROIDISM, UNSPECIFIED TYPE: Primary | ICD-10-CM

## 2024-04-11 DIAGNOSIS — M89.9 DISORDER OF BONE, UNSPECIFIED: ICD-10-CM

## 2024-04-11 DIAGNOSIS — E03.9 HYPOTHYROIDISM, UNSPECIFIED TYPE: ICD-10-CM

## 2024-04-11 DIAGNOSIS — R53.83 FATIGUE, UNSPECIFIED TYPE: ICD-10-CM

## 2024-04-11 PROCEDURE — 1160F RVW MEDS BY RX/DR IN RCRD: CPT | Performed by: NURSE PRACTITIONER

## 2024-04-11 PROCEDURE — 1159F MED LIST DOCD IN RCRD: CPT | Performed by: NURSE PRACTITIONER

## 2024-04-11 PROCEDURE — 99213 OFFICE O/P EST LOW 20 MIN: CPT | Performed by: NURSE PRACTITIONER

## 2024-04-11 RX ORDER — LEVOTHYROXINE SODIUM 0.15 MG/1
150 TABLET ORAL DAILY
Qty: 30 TABLET | Refills: 1 | Status: SHIPPED | OUTPATIENT
Start: 2024-04-11 | End: 2024-04-11

## 2024-04-11 RX ORDER — LEVOTHYROXINE SODIUM 0.15 MG/1
150 TABLET ORAL DAILY
Qty: 90 TABLET | Refills: 1 | Status: SHIPPED | OUTPATIENT
Start: 2024-04-11

## 2024-04-11 NOTE — PROGRESS NOTES
"Chief Complaint  Thyroid Problem  Answers submitted by the patient for this visit:  Other (Submitted on 4/9/2024)  Please describe your symptoms.: Hypothyroid, tiredness  Have you had these symptoms before?: Yes  How long have you been having these symptoms?: Greater than 2 weeks  Please list any medications you are currently taking for this condition.: [place] Thyroid  Please describe any probable cause for these symptoms. : Hypothyroid  Primary Reason for Visit (Submitted on 4/9/2024)  What is the primary reason for your visit?: Other    Subjective          Bernice Barrow presents to [facility]  History of Present Illness      H/o arthritis, HTN, thyroid disease, low back pain, gerd, depression/anxiety, oab (had bladder sling placed), Gay's esophagus     Current medicines are armour thyroid, diltiazem, hctz, fluoxetine, meloxicam, hydralazine, omeprazole    Lab note on 3/20/24  TSH continues to be elevated, I have sent an additional prescription for armour thyroid 15 mcg to add to your current dose of 90 mcg daily for a total dose of 105 mcg daily.  You will need to recheck the lab again in about 6 weeks, this will be on/around May 6th.     Today she tells me that she has only been taking the one, 90 mcg tablet of the armour  She did not ever get or start the 15 mg additional dose    She tells me that she is feeling very fatigued    She does not and has not ever taken the thyroid medicine by itself, first thing in the morning on an empty stomach  She also tells me that she has always had a difficult time regulating the tsh    Review of Systems   Constitutional:  Positive for fatigue.   Respiratory: Negative.     Cardiovascular: Negative.    Psychiatric/Behavioral: Negative.       Objective   Vital Signs:  /80   Pulse 84   Resp 20   Ht 170.2 cm (67.01\")   Wt 79.4 kg (175 lb)   SpO2 98%   BMI 27.40 kg/m²     BP Readings from Last 3 Encounters:   04/11/24 154/80 "   03/15/24 124/77   03/04/24 141/90        Wt Readings from Last 3 Encounters:   04/11/24 79.4 kg (175 lb)   03/28/24 80.3 kg (177 lb)   03/15/24 80.3 kg (177 lb)      Physical Exam  Vitals reviewed.   Constitutional:       Appearance: Normal appearance.   Cardiovascular:      Rate and Rhythm: Normal rate.   Pulmonary:      Effort: Pulmonary effort is normal.   Neurological:      General: No focal deficit present.      Mental Status: She is alert.   Psychiatric:         Mood and Affect: Mood normal.         Behavior: Behavior normal.        Result Review :                 Assessment and Plan    Diagnoses and all orders for this visit:    1. Hypothyroidism, unspecified type (Primary)  -     levothyroxine (SYNTHROID, LEVOTHROID) 150 MCG tablet; Take 1 tablet by mouth Daily.  Dispense: 30 tablet; Refill: 1    2. Fatigue, unspecified type  -     TSH Rfx On Abnormal To Free T4; Future  -     Vitamin D 25 hydroxy; Future  -     Vitamin B12; Future  -     CBC No Differential; Future    3. Disorder of bone, unspecified  -     Vitamin D 25 hydroxy; Future    Will get labs done in 4 weeks after changing from armour thyroid 90 mcg to levothyroxine 150 mcg and taking the med by itself first thing in the morning  Body mass index is 27.4 kg/m².         Follow Up   Return as needed, for Next scheduled follow up.  Patient was given instructions and counseling regarding her condition or for health maintenance advice. Please see specific information pulled into the AVS if appropriate.

## 2024-04-11 NOTE — PROGRESS NOTES
Discharge Summary  Discharge Summary from Physical Therapy Report      Dates  PT visit: 3/11/24- 3/26/24  Number of Visits: 1         Goals: Not Met    Discharge Plan: Patient to return to referring/providing physician    Comments Pt canceled all remaining appointments.   Pt attended only 1 treatment session.             Falguni Perez, PT  Physical Therapist

## 2024-04-16 RX ORDER — HYDROCHLOROTHIAZIDE 25 MG/1
25 TABLET ORAL DAILY
Qty: 90 TABLET | Refills: 1 | Status: SHIPPED | OUTPATIENT
Start: 2024-04-16

## 2024-04-16 RX ORDER — DILTIAZEM HYDROCHLORIDE 180 MG/1
180 CAPSULE, EXTENDED RELEASE ORAL DAILY
Qty: 90 CAPSULE | Refills: 1 | Status: SHIPPED | OUTPATIENT
Start: 2024-04-16

## 2024-04-16 RX ORDER — HYDRALAZINE HYDROCHLORIDE 25 MG/1
25 TABLET, FILM COATED ORAL 2 TIMES DAILY
Qty: 60 TABLET | Refills: 1 | Status: SHIPPED | OUTPATIENT
Start: 2024-04-16 | End: 2024-04-22 | Stop reason: SDUPTHER

## 2024-04-19 ENCOUNTER — TELEPHONE (OUTPATIENT)
Dept: FAMILY MEDICINE CLINIC | Facility: CLINIC | Age: 69
End: 2024-04-19
Payer: MEDICARE

## 2024-04-22 RX ORDER — HYDRALAZINE HYDROCHLORIDE 25 MG/1
25 TABLET, FILM COATED ORAL 2 TIMES DAILY
Qty: 60 TABLET | Refills: 1 | Status: SHIPPED | OUTPATIENT
Start: 2024-04-22

## 2024-04-23 RX ORDER — HYDRALAZINE HYDROCHLORIDE 25 MG/1
25 TABLET, FILM COATED ORAL 2 TIMES DAILY
Qty: 180 TABLET | OUTPATIENT
Start: 2024-04-23

## 2024-04-26 ENCOUNTER — LAB (OUTPATIENT)
Dept: LAB | Facility: HOSPITAL | Age: 69
End: 2024-04-26
Payer: MEDICARE

## 2024-04-26 DIAGNOSIS — E03.9 HYPOTHYROIDISM, UNSPECIFIED TYPE: Primary | ICD-10-CM

## 2024-04-26 DIAGNOSIS — M89.9 DISORDER OF BONE, UNSPECIFIED: ICD-10-CM

## 2024-04-26 DIAGNOSIS — E03.9 HYPOTHYROIDISM, UNSPECIFIED TYPE: ICD-10-CM

## 2024-04-26 DIAGNOSIS — R53.83 FATIGUE, UNSPECIFIED TYPE: ICD-10-CM

## 2024-04-26 LAB
25(OH)D3 SERPL-MCNC: 42 NG/ML (ref 30–100)
DEPRECATED RDW RBC AUTO: 42.6 FL (ref 37–54)
ERYTHROCYTE [DISTWIDTH] IN BLOOD BY AUTOMATED COUNT: 14.2 % (ref 12.3–15.4)
HCT VFR BLD AUTO: 39.6 % (ref 34–46.6)
HGB BLD-MCNC: 12.6 G/DL (ref 12–15.9)
MCH RBC QN AUTO: 26.4 PG (ref 26.6–33)
MCHC RBC AUTO-ENTMCNC: 31.8 G/DL (ref 31.5–35.7)
MCV RBC AUTO: 83 FL (ref 79–97)
PLATELET # BLD AUTO: 400 10*3/MM3 (ref 140–450)
PMV BLD AUTO: 11.3 FL (ref 6–12)
RBC # BLD AUTO: 4.77 10*6/MM3 (ref 3.77–5.28)
T4 FREE SERPL-MCNC: 1.83 NG/DL (ref 0.93–1.7)
TSH SERPL DL<=0.05 MIU/L-ACNC: 5.94 UIU/ML (ref 0.27–4.2)
VIT B12 BLD-MCNC: 784 PG/ML (ref 211–946)
WBC NRBC COR # BLD AUTO: 10.82 10*3/MM3 (ref 3.4–10.8)

## 2024-04-26 PROCEDURE — 84439 ASSAY OF FREE THYROXINE: CPT

## 2024-04-26 PROCEDURE — 82607 VITAMIN B-12: CPT

## 2024-04-26 PROCEDURE — 36415 COLL VENOUS BLD VENIPUNCTURE: CPT

## 2024-04-26 PROCEDURE — 82306 VITAMIN D 25 HYDROXY: CPT

## 2024-04-26 PROCEDURE — 85027 COMPLETE CBC AUTOMATED: CPT

## 2024-04-26 PROCEDURE — 84443 ASSAY THYROID STIM HORMONE: CPT

## 2024-04-26 RX ORDER — LEVOTHYROXINE SODIUM 175 UG/1
175 CAPSULE ORAL EVERY MORNING
Qty: 30 CAPSULE | Refills: 1 | Status: SHIPPED | OUTPATIENT
Start: 2024-04-26 | End: 2024-04-29

## 2024-04-29 RX ORDER — LEVOTHYROXINE SODIUM 175 UG/1
TABLET ORAL EVERY MORNING
Qty: 90 TABLET | Refills: 1 | Status: SHIPPED | OUTPATIENT
Start: 2024-04-29

## 2024-04-30 ENCOUNTER — HOSPITAL ENCOUNTER (OUTPATIENT)
Facility: HOSPITAL | Age: 69
Discharge: HOME | End: 2024-04-30
Attending: EMERGENCY MEDICINE | Admitting: EMERGENCY MEDICINE
Payer: MEDICARE

## 2024-04-30 ENCOUNTER — APPOINTMENT (OUTPATIENT)
Dept: GENERAL RADIOLOGY | Facility: HOSPITAL | Age: 69
End: 2024-04-30
Payer: MEDICARE

## 2024-04-30 VITALS
TEMPERATURE: 97.4 F | DIASTOLIC BLOOD PRESSURE: 80 MMHG | HEART RATE: 82 BPM | SYSTOLIC BLOOD PRESSURE: 171 MMHG | RESPIRATION RATE: 18 BRPM | BODY MASS INDEX: 29.37 KG/M2 | HEIGHT: 65 IN | WEIGHT: 176.3 LBS | OXYGEN SATURATION: 97 %

## 2024-04-30 DIAGNOSIS — M25.552 LEFT HIP PAIN: Primary | ICD-10-CM

## 2024-04-30 PROCEDURE — 73502 X-RAY EXAM HIP UNI 2-3 VIEWS: CPT

## 2024-04-30 PROCEDURE — G0463 HOSPITAL OUTPT CLINIC VISIT: HCPCS | Performed by: EMERGENCY MEDICINE

## 2024-04-30 NOTE — DISCHARGE INSTRUCTIONS
Take ibuprofen and Tylenol as needed for pain.  Put ice on the hip 4-5 times a day.  Call the [facility] patient connection line for assistance with making appointment with orthopedics.

## 2024-04-30 NOTE — FSED PROVIDER NOTE
Subjective   History of Present Illness  69-year-old female presents complaining of left hip and groin pain after falling about a month and a half ago.  She is able to walk but it hurts to do so.  Did not see a doctor at the time but because of ongoing pain she comes in today.  Review of Systems   Musculoskeletal:  Positive for arthralgias.       Past Medical History:   Diagnosis Date    Arthritis     Bronchitis     h/o    Cervical disc disorder     Difficulty walking     Hammer toe     Hypertension     Low back pain     Lumbosacral disc disease     Peripheral neuropathy     Stress fracture     Thyroid disease        Allergies   Allergen Reactions    Hydrocodone Nausea And Vomiting    Codeine GI Intolerance       Past Surgical History:   Procedure Laterality Date    COLONOSCOPY      CORRECTION HAMMER TOE      FOOT FRACTURE SURGERY      FOOT SURGERY Bilateral     heel spurs-- and rt  foot fx    REPLACEMENT TOTAL KNEE Right         REPLACEMENT TOTAL KNEE Left            Family History   Problem Relation Age of Onset    Arthritis Mother     Depression Mother     Hyperlipidemia Mother     Hypertension Mother     Arthritis Father     Asthma Father     Cancer Father         Esophageal Ca    Early death Maternal Grandfather         Heart attack at 28 yrs old    Alcohol abuse Paternal Uncle     COPD Sister     Depression Sister     Drug abuse Sister        Social History     Socioeconomic History    Marital status:    Tobacco Use    Smoking status: Some Days     Current packs/day: 0.00     Average packs/day: 0.3 packs/day for 3.0 years (0.8 ttl pk-yrs)     Types: Cigarettes     Start date: 2021     Last attempt to quit: 2022     Years since quittin.3    Smokeless tobacco: Never    Tobacco comments:     Off and on   Vaping Use    Vaping status: Former   Substance and Sexual Activity    Alcohol use: Not Currently     Comment: ocassional    Drug use: Never    Sexual activity: Not Currently      Partners: Male     Birth control/protection: Other, Post-menopausal, Hysterectomy           Objective   Physical Exam  Nursing notes reviewed.  INITIAL VITAL SIGNS: Reviewed by me.  Pulse ox normal  GENERAL: Alert. Well developed and well nourished. No respiratory distress.  HEAD: Normocephalic.   EYES: No conjunctival injection.  ENT: Oral mucosa is moist.  NECK/BACK: Supple. Full range of motion.  Nontender to palpation in the lower back  RESPIRATORY: Non-labored respirations.  Mild pain with lateral compression of the left hip.  Mild pain with flexion extension of the hip   EXTREMITIES: No deformity.  SKIN: Warm and dry. No rashes. No diaphoresis.  NEUROLOGIC: Alert. Normal gait    Procedures           ED Course                                           Medical Decision Making  Amount and/or Complexity of Data Reviewed  Radiology: ordered.      Patient with hip and groin pain since falling on lateral hip about a month to month and a half ago.  Presents today for evaluation.  On exam she is in some pain with lateral compression no severe pain.  I have independently interpreted the x-ray and x-ray shows no fractures but there is severe degenerative change of the left hip.  Will refer her to Ortho.  Final diagnoses:   Left hip pain       ED Disposition  ED Disposition       ED Disposition   Discharge    Condition   Good    Comment   --               PATIENT CONNECTION - [facility] [address] [phone]  Call   For assistance with appointment.         Medication List      No changes were made to your prescriptions during this visit.

## 2024-05-01 ENCOUNTER — OFFICE VISIT (OUTPATIENT)
Dept: ORTHOPEDIC SURGERY | Facility: CLINIC | Age: 69
End: 2024-05-01
Payer: MEDICARE

## 2024-05-01 VITALS — BODY MASS INDEX: 29.32 KG/M2 | RESPIRATION RATE: 20 BRPM | HEIGHT: 65 IN | WEIGHT: 176 LBS | OXYGEN SATURATION: 97 %

## 2024-05-01 DIAGNOSIS — M16.12 PRIMARY OSTEOARTHRITIS OF LEFT HIP: Primary | ICD-10-CM

## 2024-05-01 PROCEDURE — 99213 OFFICE O/P EST LOW 20 MIN: CPT | Performed by: NURSE PRACTITIONER

## 2024-05-01 NOTE — PROGRESS NOTES
[facility]        Patient Name: Bernice Barrow  : 1955  Primary Care Physician: Apollo Arellano APRN        Chief Complaint:    Chief Complaint   Patient presents with    Left Hip - Pain, Initial Evaluation      PAIN FOR A COUPLE MONTHS         HPI:   Bernice Barrow is a 69 y.o. year old who presents today for evaluation of left hip pain.    Pain has been present for years, but recently worse after a fall onto her left side. Fall was ~ 2 months ago. She did not have immediate pain in the hip afterwards, but a gradual onset. She presented to an [facility] with xray imaging performed. No acute fractures were noted.     Pain location is the left groin and anterior hip area. Described as severe, 10/10. She has difficulty with routine ADLs due to pain. She cannot put her socks and shoes on without severe pain. Bathing, getting in and out of cars, climbing in/out of bed are all extremely difficult.     She has side effects with many pain meds so only takes Tylenol which provides minimal relief of symptoms.     She has a history of bilateral total knee arthroplasty. She tolerated those surgeries without complications.     PMH is significant for controlled HTN, Gay's esophagus and tobacco use. Denies DM, arrhythmia,  malignancy or blood clot.         Past Medical/Surgical, Social and Family History:  I have reviewed and/or updated pertinent history as noted in the medical record including:  Past Medical History:   Diagnosis Date    Arthritis     Bronchitis     h/o    Cervical disc disorder     Difficulty walking     Hammer toe     Hypertension     Low back pain     Lumbosacral disc disease     Peripheral neuropathy     Scoliosis [id number]    Stress fracture     Thyroid disease      Past Surgical History:   Procedure Laterality Date    COLONOSCOPY      CORRECTION HAMMER TOE      FOOT FRACTURE SURGERY      FOOT SURGERY Bilateral     heel spurs-- and rt  foot fx    JOINT REPLACEMENT  [id number]     REPLACEMENT TOTAL KNEE Right         REPLACEMENT TOTAL KNEE Left          Social History     Occupational History    Not on file   Tobacco Use    Smoking status: Some Days     Current packs/day: 0.00     Average packs/day: 0.3 packs/day for 3.0 years (0.8 ttl pk-yrs)     Types: Cigarettes     Start date: 2021     Last attempt to quit: 2022     Years since quittin.3    Smokeless tobacco: Never    Tobacco comments:     Off and on   Vaping Use    Vaping status: Former   Substance and Sexual Activity    Alcohol use: Not Currently     Comment: ocassional    Drug use: Never    Sexual activity: Not Currently     Partners: Male     Birth control/protection: Other, Post-menopausal, Hysterectomy          Allergies:   Allergies   Allergen Reactions    Hydrocodone Nausea And Vomiting    Codeine GI Intolerance       Medications:   Home Medications:  Current Outpatient Medications on File Prior to Visit   Medication Sig    dilTIAZem (TIAZAC) 180 MG 24 hr capsule Take 1 capsule by mouth Daily.    FLUoxetine (PROzac) 20 MG capsule Take 2 capsules by mouth Daily.    hydrALAZINE (APRESOLINE) 25 MG tablet Take 1 tablet by mouth 2 (Two) Times a Day.    hydroCHLOROthiazide 25 MG tablet Take 1 tablet by mouth Daily.    hydrOXYzine (ATARAX) 25 MG tablet TAKE 1 TABLET BY MOUTH AT NIGHT AS NEEDED FOR ANXIETY    lansoprazole (PREVACID) 15 MG capsule Take 1 capsule by mouth Daily. As needed    levothyroxine (SYNTHROID, LEVOTHROID) 175 MCG tablet TAKE 1 CAPSULE BY MOUTH EVERY MORNING    meloxicam (MOBIC) 15 MG tablet Take 1 tablet by mouth Daily.     No current facility-administered medications on file prior to visit.         ROS:  Negative unless listed in the HPI    Physical Exam:   69 y.o. female  Body mass index is 29.29 kg/m²., 79.8 kg (176 lb)  Vitals:    24 0921   Resp: 20   SpO2: 97%     General: Alert, cooperative, appears well and in no observable distress.   HEENT: Normocephalic, atraumatic on external  visual inspection. No icterus.   CV: No significant peripheral edema.    Respiratory: Normal respiratory effort.   Skin: Warm & well perfused; appropriate skin turgor.  Psych: Appropriate mood & affect.  Neuro: Gross sensation and motor intact in affected extremity/extremities.  Vascular: Peripheral pulses palpable in affected extremity/extremities.     Left Hip Exam     Tests   KAREN: positive           Hip Musculoskeletal Exam    Range of Motion    Left      Active ROM: abnormal and pain.        Passive ROM: abnormal and pain.      Special Tests    Left      Log roll test: positive      KAREN test (left): positive      Internal rotation: positive      External rotation: positive       Radiology:  Narrative & Impression   XR HIP W OR WO PELVIS 2-3 VIEW LEFT     Date of Exam: 4/30/2024 1:44 PM EDT     Indication: continued groing and hip pain after fall last month     Comparison: None available.     Findings:  Severe degenerative change of the left hip. Bone-on-bone configuration of the acetabulum and femoral head with marginal sclerosis and marginal osteophyte formation and subchondral cystic change. The left renal head is mildly flattened at its superior   articular surface without heather subchondral collapse.     There are mild degenerative changes of the right hip with mild joint space narrowing and small acetabular marginal osteophyte.     No acute hip fracture or hip dislocation. No pelvic fracture. Advanced age of disc plate changes are suggested at L4-5 and L5-S1.     IMPRESSION:  Impression:     1. No acute pelvic finding. No acute hip finding.  2. Severe degenerative changes of the left hip.        Electronically Signed: Andreea Cooper MD    4/30/2024 2:00 PM EDT    Workstation ID: [id number]           Assessment:  Left hip osteoarthritis, severe  Tobacco abuse  Body mass index is 29.29 kg/m².  BMI consistent with Overweight: 25.0-29.9kg/m2            Plan:  I have reviewed the above imaging with the patient  and her spouse in detail today  Given the severity of her symptoms and radiology images, I have recommend she meet with Dr. Sal and discuss total hip arthroplasty  We did review lissette/post operative recovery expectations today in detail including post operative pain, physical therapy and mobility  She has a h/o bilateral knee replacement which she tolerated well, so I think she will tolerate the hip procedure well  We also discussed smoking cessation today. We discussed Chantix, Nicotine patches or Nicorette gum   She elected for gum which is reasonable   BMI reviewed  Refer to Dr. Sal for surgical consultation   Patient encouraged to call with any questions or concerns in the interim    OLMAN Cardoso

## 2024-05-10 ENCOUNTER — OFFICE VISIT (OUTPATIENT)
Dept: ORTHOPEDIC SURGERY | Facility: CLINIC | Age: 69
End: 2024-05-10
Payer: MEDICARE

## 2024-05-10 VITALS — WEIGHT: 175 LBS | HEART RATE: 73 BPM | BODY MASS INDEX: 29.16 KG/M2 | OXYGEN SATURATION: 99 % | HEIGHT: 65 IN

## 2024-05-10 DIAGNOSIS — Z96.641 AFTERCARE FOLLOWING RIGHT HIP JOINT REPLACEMENT SURGERY: ICD-10-CM

## 2024-05-10 DIAGNOSIS — R79.9 ABNORMAL FINDING OF BLOOD CHEMISTRY, UNSPECIFIED: ICD-10-CM

## 2024-05-10 DIAGNOSIS — Z47.1 AFTERCARE FOLLOWING RIGHT HIP JOINT REPLACEMENT SURGERY: ICD-10-CM

## 2024-05-10 DIAGNOSIS — M16.12 PRIMARY OSTEOARTHRITIS OF LEFT HIP: Primary | ICD-10-CM

## 2024-05-10 PROBLEM — M16.9 OA (OSTEOARTHRITIS) OF HIP: Status: ACTIVE | Noted: 2024-05-10

## 2024-05-10 RX ORDER — CHLORHEXIDINE GLUCONATE 500 MG/1
CLOTH TOPICAL ONCE
OUTPATIENT
Start: 2024-05-10 | End: 2024-05-10

## 2024-05-10 RX ORDER — PREGABALIN 150 MG/1
150 CAPSULE ORAL ONCE
OUTPATIENT
Start: 2024-05-10 | End: 2024-05-10

## 2024-05-10 RX ORDER — FLUOXETINE HYDROCHLORIDE 40 MG/1
1 CAPSULE ORAL DAILY
COMMUNITY
Start: 2024-05-04

## 2024-05-10 RX ORDER — MELOXICAM 7.5 MG/1
15 TABLET ORAL ONCE
OUTPATIENT
Start: 2024-05-10 | End: 2024-05-10

## 2024-05-10 RX ORDER — ACETAMINOPHEN 500 MG
500 TABLET ORAL EVERY 6 HOURS PRN
COMMUNITY

## 2024-05-12 DIAGNOSIS — G47.00 INSOMNIA, UNSPECIFIED TYPE: ICD-10-CM

## 2024-05-13 ENCOUNTER — TELEPHONE (OUTPATIENT)
Dept: ORTHOPEDIC SURGERY | Facility: CLINIC | Age: 69
End: 2024-05-13
Payer: MEDICARE

## 2024-05-13 DIAGNOSIS — M16.12 PRIMARY OSTEOARTHRITIS OF LEFT HIP: Primary | ICD-10-CM

## 2024-05-13 RX ORDER — TRAMADOL HYDROCHLORIDE 50 MG/1
50 TABLET ORAL EVERY 12 HOURS PRN
Qty: 30 TABLET | Refills: 0 | Status: SHIPPED | OUTPATIENT
Start: 2024-05-13

## 2024-05-13 RX ORDER — HYDROXYZINE HYDROCHLORIDE 25 MG/1
25 TABLET, FILM COATED ORAL NIGHTLY PRN
Qty: 30 TABLET | Refills: 1 | Status: SHIPPED | OUTPATIENT
Start: 2024-05-13

## 2024-05-13 NOTE — TELEPHONE ENCOUNTER
Dr. Comer,     I didn't see any mention of a Tramadol within the office visit.  Are you ok with prescribing this?

## 2024-05-14 NOTE — TELEPHONE ENCOUNTER
Alvaro Comer MD  You11 hours ago (8:42 PM)       Happy to prescribe for severe pain only until surgery.

## 2024-05-15 DIAGNOSIS — M16.12 PRIMARY OSTEOARTHRITIS OF LEFT HIP: Primary | ICD-10-CM

## 2024-05-15 RX ORDER — TRAMADOL HYDROCHLORIDE 50 MG/1
50 TABLET ORAL EVERY 12 HOURS PRN
Qty: 30 TABLET | Refills: 0 | Status: SHIPPED | OUTPATIENT
Start: 2024-05-15

## 2024-05-15 NOTE — TELEPHONE ENCOUNTER
PATIENT CALLED STATING PHARMACY HAS NOT RECEIVED ORDER FOR TRAMADOL. E--Prescribing Status: Transmission to pharmacy failed. REQUESTING A CALL BACK WHEN COMPLETED.    PLEASE ADVISE.    CALL BACK #: [phone]

## 2024-05-23 ENCOUNTER — OFFICE VISIT (OUTPATIENT)
Dept: FAMILY MEDICINE CLINIC | Facility: CLINIC | Age: 69
End: 2024-05-23
Payer: MEDICARE

## 2024-05-23 ENCOUNTER — LAB (OUTPATIENT)
Dept: FAMILY MEDICINE CLINIC | Facility: CLINIC | Age: 69
End: 2024-05-23
Payer: MEDICARE

## 2024-05-23 VITALS
HEIGHT: 65 IN | RESPIRATION RATE: 20 BRPM | WEIGHT: 175 LBS | HEART RATE: 86 BPM | DIASTOLIC BLOOD PRESSURE: 80 MMHG | BODY MASS INDEX: 29.16 KG/M2 | OXYGEN SATURATION: 99 % | SYSTOLIC BLOOD PRESSURE: 130 MMHG

## 2024-05-23 DIAGNOSIS — E03.9 HYPOTHYROIDISM, UNSPECIFIED TYPE: ICD-10-CM

## 2024-05-23 DIAGNOSIS — Z01.818 PREOPERATIVE EXAMINATION: ICD-10-CM

## 2024-05-23 DIAGNOSIS — Z01.818 PREOPERATIVE EXAMINATION: Primary | ICD-10-CM

## 2024-05-23 LAB
ALBUMIN SERPL-MCNC: 4.5 G/DL (ref 3.5–5.2)
ALBUMIN/GLOB SERPL: 1.8 G/DL
ALP SERPL-CCNC: 81 U/L (ref 39–117)
ALT SERPL W P-5'-P-CCNC: 14 U/L (ref 1–33)
ANION GAP SERPL CALCULATED.3IONS-SCNC: 10 MMOL/L (ref 5–15)
AST SERPL-CCNC: 15 U/L (ref 1–32)
BILIRUB SERPL-MCNC: 0.6 MG/DL (ref 0–1.2)
BUN SERPL-MCNC: 20 MG/DL (ref 8–23)
BUN/CREAT SERPL: 21.7 (ref 7–25)
CALCIUM SPEC-SCNC: 9.7 MG/DL (ref 8.6–10.5)
CHLORIDE SERPL-SCNC: 101 MMOL/L (ref 98–107)
CO2 SERPL-SCNC: 29 MMOL/L (ref 22–29)
CREAT SERPL-MCNC: 0.92 MG/DL (ref 0.57–1)
DEPRECATED RDW RBC AUTO: 41 FL (ref 37–54)
EGFRCR SERPLBLD CKD-EPI 2021: 68.4 ML/MIN/1.73
ERYTHROCYTE [DISTWIDTH] IN BLOOD BY AUTOMATED COUNT: 13.4 % (ref 12.3–15.4)
GLOBULIN UR ELPH-MCNC: 2.5 GM/DL
GLUCOSE SERPL-MCNC: 88 MG/DL (ref 65–99)
HCT VFR BLD AUTO: 39.8 % (ref 34–46.6)
HGB BLD-MCNC: 12.5 G/DL (ref 12–15.9)
MCH RBC QN AUTO: 26.3 PG (ref 26.6–33)
MCHC RBC AUTO-ENTMCNC: 31.4 G/DL (ref 31.5–35.7)
MCV RBC AUTO: 83.8 FL (ref 79–97)
PLATELET # BLD AUTO: 354 10*3/MM3 (ref 140–450)
PMV BLD AUTO: 10.6 FL (ref 6–12)
POTASSIUM SERPL-SCNC: 3.9 MMOL/L (ref 3.5–5.2)
PROT SERPL-MCNC: 7 G/DL (ref 6–8.5)
RBC # BLD AUTO: 4.75 10*6/MM3 (ref 3.77–5.28)
SODIUM SERPL-SCNC: 140 MMOL/L (ref 136–145)
T4 FREE SERPL-MCNC: 2.49 NG/DL (ref 0.93–1.7)
TSH SERPL DL<=0.05 MIU/L-ACNC: 0.17 UIU/ML (ref 0.27–4.2)
WBC NRBC COR # BLD AUTO: 7.77 10*3/MM3 (ref 3.4–10.8)

## 2024-05-23 PROCEDURE — 80053 COMPREHEN METABOLIC PANEL: CPT | Performed by: NURSE PRACTITIONER

## 2024-05-23 PROCEDURE — 84443 ASSAY THYROID STIM HORMONE: CPT | Performed by: NURSE PRACTITIONER

## 2024-05-23 PROCEDURE — 1159F MED LIST DOCD IN RCRD: CPT | Performed by: NURSE PRACTITIONER

## 2024-05-23 PROCEDURE — 99213 OFFICE O/P EST LOW 20 MIN: CPT | Performed by: NURSE PRACTITIONER

## 2024-05-23 PROCEDURE — 1125F AMNT PAIN NOTED PAIN PRSNT: CPT | Performed by: NURSE PRACTITIONER

## 2024-05-23 PROCEDURE — 85027 COMPLETE CBC AUTOMATED: CPT | Performed by: NURSE PRACTITIONER

## 2024-05-23 PROCEDURE — 1160F RVW MEDS BY RX/DR IN RCRD: CPT | Performed by: NURSE PRACTITIONER

## 2024-05-23 PROCEDURE — 93000 ELECTROCARDIOGRAM COMPLETE: CPT | Performed by: NURSE PRACTITIONER

## 2024-05-23 PROCEDURE — 36415 COLL VENOUS BLD VENIPUNCTURE: CPT

## 2024-05-23 PROCEDURE — 84439 ASSAY OF FREE THYROXINE: CPT | Performed by: NURSE PRACTITIONER

## 2024-05-23 NOTE — PROGRESS NOTES
"Chief Complaint  surgery clearance     Subjective          Bernice Barrow presents to [facility]  History of Present Illness    H/o arthritis, HTN, thyroid disease, low back pain, gerd, depression/anxiety, oab (had bladder sling placed), Gay's esophagus     Current medicines are armour thyroid, diltiazem, hctz, fluoxetine, meloxicam, hydralazine, omeprazole    Is here today seeking clearance for surgery with  at [facility] for a left total hip    She has h/o having both knees replaced per Dr. Garcia, they were done 5 and 15 years ago    She denies any h/o of heart or lung problems  She is limited on activity due to her hip pain    Review of Systems   Constitutional:  Positive for fatigue. Negative for fever.   Respiratory: Negative.  Negative for chest tightness, shortness of breath and wheezing.    Cardiovascular: Negative.  Negative for chest pain, palpitations and leg swelling.   Gastrointestinal:  Negative for abdominal pain, constipation and diarrhea.        H/o gerd and barretts esophagus, takes a ppi daily   Genitourinary: Negative.  Negative for dyspareunia, frequency and urgency.   Musculoskeletal:  Positive for arthralgias.   Neurological:  Negative for dizziness, weakness and headaches.   Psychiatric/Behavioral:  Positive for sleep disturbance. Negative for dysphoric mood. The patient is not nervous/anxious.         She endorses that she is not sleeping well due to pain     Objective   Vital Signs:  /80   Pulse 86   Resp 20   Ht 165.1 cm (65\")   Wt 79.4 kg (175 lb)   SpO2 99%   BMI 29.12 kg/m²     BP Readings from Last 3 Encounters:   05/23/24 130/80   04/30/24 171/80   04/11/24 154/80        Wt Readings from Last 3 Encounters:   05/23/24 79.4 kg (175 lb)   05/10/24 79.4 kg (175 lb)   05/01/24 79.8 kg (176 lb)         Physical Exam  Vitals reviewed.   Constitutional:       Appearance: Normal appearance.   Neck:      Thyroid: No thyromegaly.    "   Vascular: No carotid bruit.   Cardiovascular:      Rate and Rhythm: Normal rate and regular rhythm.      Heart sounds: Normal heart sounds.   Pulmonary:      Effort: Pulmonary effort is normal.      Breath sounds: Normal breath sounds.   Abdominal:      General: Bowel sounds are normal.      Palpations: Abdomen is soft.      Tenderness: There is no abdominal tenderness. There is no right CVA tenderness or left CVA tenderness.   Musculoskeletal:      Cervical back: Neck supple. No tenderness.      Left lower leg: No edema.      Comments: Trace right ankle edema   Lymphadenopathy:      Cervical: No cervical adenopathy.   Skin:     General: Skin is warm.   Neurological:      Mental Status: She is alert and oriented to person, place, and time.   Psychiatric:         Mood and Affect: Mood normal.         Behavior: Behavior normal.        Result Review :            ECG 12 Lead    Date/Time: 5/23/2024 9:19 AM  Performed by: Apollo Arellano APRN    Authorized by: Apollo Arellano APRN  Comparison: compared with previous ECG from 11/10/2022  Rhythm: sinus rhythm  Rate: normal  BPM: 79  Other findings: left ventricular hypertrophy    Clinical impression: abnormal EKG            Assessment and Plan    Diagnoses and all orders for this visit:    1. Preoperative examination (Primary)  -     XR Chest 2 View; Future  -     ECG 12 Lead  -     CBC No Differential; Future  -     Comprehensive metabolic panel; Future  -     Ambulatory Referral to Cardiology             Follow Up   Return as needed, for Next scheduled follow up.  Patient was given instructions and counseling regarding her condition or for health maintenance advice. Please see specific information pulled into the AVS if appropriate.

## 2024-05-29 DIAGNOSIS — E03.9 HYPOTHYROIDISM, UNSPECIFIED TYPE: Primary | ICD-10-CM

## 2024-06-10 ENCOUNTER — TELEPHONE (OUTPATIENT)
Dept: ORTHOPEDIC SURGERY | Facility: CLINIC | Age: 69
End: 2024-06-10
Payer: MEDICARE

## 2024-06-10 DIAGNOSIS — M16.12 PRIMARY OSTEOARTHRITIS OF LEFT HIP: ICD-10-CM

## 2024-06-10 NOTE — TELEPHONE ENCOUNTER
Caller: Bernice Barrow    Relationship: Self    Best call back number: [phone]    What orders are you requesting (i.e. lab or imaging): BEDSIDE COMMODE WITH ARMS     In what timeframe would the patient need to come in: ASAP    Where will you receive your lab/imaging services: [facility] [address] PH:[phone]    Additional notes: PATIENT WOULD LIKE THE ORDER SENT HERE ASAP PLEASE AS SHE IS HAVING DIFFICULTY GETTING UP AND DOWN.

## 2024-06-10 NOTE — TELEPHONE ENCOUNTER
I called [facility] to get fax number to send order.  Employee with [facility] stated that if patient wants to use insurance there is a process.  There is a form that needs to be filled out along with office notes.  [facility] stated that it can be an lengthy process.  If the patient needs it rather quickly they just have patients pay for it out right which is $74.99.      I called and LM for patient with above information.  I stated that Dr. Comer wont even be back in the office until 6/13 for him to even sign the form if patient elects to use insurance.  I asked patient to return my phone call to let me know what she wanted to do.

## 2024-06-10 NOTE — TELEPHONE ENCOUNTER
Caller: Bernice Barrow    Relationship: Self    Best call back number: [phone]    Requested Prescriptions:   Requested Prescriptions     Pending Prescriptions Disp Refills    traMADol (ULTRAM) 50 MG tablet 30 tablet 0     Sig: Take 1 tablet by mouth Every 12 (Twelve) Hours As Needed for Moderate Pain.        Pharmacy where request should be sent:  [facility]     Last office visit with prescribing clinician: 5/10/2024   Last telemedicine visit with prescribing clinician: Visit date not found   Next office visit with prescribing clinician: 7/8/2024     Additional details provided by patient: PATIENT HAS BEEN TAKING HER TRAMADOL AT LEAST EVERY 6 HOURS TO KEEP PAIN UNDER CONTROL     Does the patient have less than a 3 day supply:  [x] Yes  [] No    Would you like a call back once the refill request has been completed: [] Yes [x] No    If the office needs to give you a call back, can they leave a voicemail: [x] Yes [] No    Hien Carranza   06/10/24 14:03 EDT

## 2024-06-11 ENCOUNTER — OFFICE VISIT (OUTPATIENT)
Dept: CARDIOLOGY | Facility: CLINIC | Age: 69
End: 2024-06-11
Payer: MEDICARE

## 2024-06-11 VITALS
BODY MASS INDEX: 27.8 KG/M2 | SYSTOLIC BLOOD PRESSURE: 145 MMHG | HEART RATE: 88 BPM | DIASTOLIC BLOOD PRESSURE: 78 MMHG | HEIGHT: 66 IN | WEIGHT: 173 LBS | RESPIRATION RATE: 18 BRPM

## 2024-06-11 DIAGNOSIS — R94.31 ABNORMAL EKG: Primary | ICD-10-CM

## 2024-06-11 DIAGNOSIS — Z00.00 PREVENTATIVE HEALTH CARE: ICD-10-CM

## 2024-06-11 DIAGNOSIS — Z13.220 SCREENING FOR CHOLESTEROL LEVEL: ICD-10-CM

## 2024-06-11 PROCEDURE — 99204 OFFICE O/P NEW MOD 45 MIN: CPT | Performed by: INTERNAL MEDICINE

## 2024-06-11 RX ORDER — ATENOLOL 25 MG/1
25 TABLET ORAL DAILY
Qty: 30 TABLET | Refills: 11 | Status: SHIPPED | OUTPATIENT
Start: 2024-06-11

## 2024-06-11 RX ORDER — HYDRALAZINE HYDROCHLORIDE 50 MG/1
50 TABLET, FILM COATED ORAL 2 TIMES DAILY
Qty: 60 TABLET | Refills: 5 | Status: SHIPPED | OUTPATIENT
Start: 2024-06-11

## 2024-06-12 ENCOUNTER — PATIENT ROUNDING (BHMG ONLY) (OUTPATIENT)
Dept: CARDIOLOGY | Facility: CLINIC | Age: 69
End: 2024-06-12
Payer: MEDICARE

## 2024-06-12 ENCOUNTER — LAB (OUTPATIENT)
Dept: LAB | Facility: HOSPITAL | Age: 69
End: 2024-06-12
Payer: MEDICARE

## 2024-06-12 ENCOUNTER — PRE-ADMISSION TESTING (OUTPATIENT)
Dept: PREADMISSION TESTING | Facility: HOSPITAL | Age: 69
End: 2024-06-12
Payer: MEDICARE

## 2024-06-12 VITALS
RESPIRATION RATE: 16 BRPM | SYSTOLIC BLOOD PRESSURE: 152 MMHG | DIASTOLIC BLOOD PRESSURE: 80 MMHG | BODY MASS INDEX: 31.28 KG/M2 | HEART RATE: 80 BPM | HEIGHT: 62 IN | TEMPERATURE: 98 F | WEIGHT: 170 LBS | OXYGEN SATURATION: 97 %

## 2024-06-12 DIAGNOSIS — Z13.220 SCREENING FOR CHOLESTEROL LEVEL: ICD-10-CM

## 2024-06-12 DIAGNOSIS — M16.12 PRIMARY OSTEOARTHRITIS OF LEFT HIP: ICD-10-CM

## 2024-06-12 DIAGNOSIS — Z47.1 AFTERCARE FOLLOWING RIGHT HIP JOINT REPLACEMENT SURGERY: ICD-10-CM

## 2024-06-12 DIAGNOSIS — E03.9 HYPOTHYROIDISM, UNSPECIFIED TYPE: ICD-10-CM

## 2024-06-12 DIAGNOSIS — Z00.00 PREVENTATIVE HEALTH CARE: ICD-10-CM

## 2024-06-12 DIAGNOSIS — R79.9 ABNORMAL FINDING OF BLOOD CHEMISTRY, UNSPECIFIED: ICD-10-CM

## 2024-06-12 DIAGNOSIS — R94.31 ABNORMAL EKG: ICD-10-CM

## 2024-06-12 DIAGNOSIS — Z96.641 AFTERCARE FOLLOWING RIGHT HIP JOINT REPLACEMENT SURGERY: ICD-10-CM

## 2024-06-12 LAB
ABO GROUP BLD: NORMAL
ALBUMIN SERPL-MCNC: 4.3 G/DL (ref 3.5–5.2)
ALBUMIN/GLOB SERPL: 1.6 G/DL
ALP SERPL-CCNC: 95 U/L (ref 39–117)
ALT SERPL W P-5'-P-CCNC: 11 U/L (ref 1–33)
ANION GAP SERPL CALCULATED.3IONS-SCNC: 11.9 MMOL/L (ref 5–15)
AST SERPL-CCNC: 14 U/L (ref 1–32)
BACTERIA UR QL AUTO: NORMAL /HPF
BASOPHILS # BLD AUTO: 0.12 10*3/MM3 (ref 0–0.2)
BASOPHILS NFR BLD AUTO: 1.3 % (ref 0–1.5)
BILIRUB SERPL-MCNC: 0.6 MG/DL (ref 0–1.2)
BILIRUB UR QL STRIP: NEGATIVE
BLD GP AB SCN SERPL QL: NEGATIVE
BUN SERPL-MCNC: 23 MG/DL (ref 8–23)
BUN/CREAT SERPL: 23.7 (ref 7–25)
CALCIUM SPEC-SCNC: 10.1 MG/DL (ref 8.6–10.5)
CHLORIDE SERPL-SCNC: 101 MMOL/L (ref 98–107)
CHOLEST SERPL-MCNC: 205 MG/DL (ref 0–200)
CLARITY UR: CLEAR
CO2 SERPL-SCNC: 28.1 MMOL/L (ref 22–29)
COLOR UR: ABNORMAL
CREAT SERPL-MCNC: 0.97 MG/DL (ref 0.57–1)
DEPRECATED RDW RBC AUTO: 38.7 FL (ref 37–54)
EGFRCR SERPLBLD CKD-EPI 2021: 64.2 ML/MIN/1.73
EOSINOPHIL # BLD AUTO: 0.2 10*3/MM3 (ref 0–0.4)
EOSINOPHIL NFR BLD AUTO: 2.1 % (ref 0.3–6.2)
ERYTHROCYTE [DISTWIDTH] IN BLOOD BY AUTOMATED COUNT: 13 % (ref 12.3–15.4)
GLOBULIN UR ELPH-MCNC: 2.7 GM/DL
GLUCOSE SERPL-MCNC: 102 MG/DL (ref 65–99)
GLUCOSE UR STRIP-MCNC: NEGATIVE MG/DL
HBA1C MFR BLD: 5.3 % (ref 4.8–5.6)
HCT VFR BLD AUTO: 39.2 % (ref 34–46.6)
HDLC SERPL-MCNC: 92 MG/DL (ref 40–60)
HGB BLD-MCNC: 12.7 G/DL (ref 12–15.9)
HGB UR QL STRIP.AUTO: NEGATIVE
HOLD SPECIMEN: NORMAL
HYALINE CASTS UR QL AUTO: NORMAL /LPF
IMM GRANULOCYTES # BLD AUTO: 0.03 10*3/MM3 (ref 0–0.05)
IMM GRANULOCYTES NFR BLD AUTO: 0.3 % (ref 0–0.5)
KETONES UR QL STRIP: ABNORMAL
LDLC SERPL CALC-MCNC: 98 MG/DL (ref 0–100)
LDLC/HDLC SERPL: 1.04 {RATIO}
LEUKOCYTE ESTERASE UR QL STRIP.AUTO: NEGATIVE
LYMPHOCYTES # BLD AUTO: 1.79 10*3/MM3 (ref 0.7–3.1)
LYMPHOCYTES NFR BLD AUTO: 18.8 % (ref 19.6–45.3)
MAGNESIUM SERPL-MCNC: 1.6 MG/DL (ref 1.6–2.4)
MCH RBC QN AUTO: 26.6 PG (ref 26.6–33)
MCHC RBC AUTO-ENTMCNC: 32.4 G/DL (ref 31.5–35.7)
MCV RBC AUTO: 82 FL (ref 79–97)
MONOCYTES # BLD AUTO: 0.61 10*3/MM3 (ref 0.1–0.9)
MONOCYTES NFR BLD AUTO: 6.4 % (ref 5–12)
MRSA DNA SPEC QL NAA+PROBE: NORMAL
NEUTROPHILS NFR BLD AUTO: 6.77 10*3/MM3 (ref 1.7–7)
NEUTROPHILS NFR BLD AUTO: 71.1 % (ref 42.7–76)
NITRITE UR QL STRIP: NEGATIVE
NRBC BLD AUTO-RTO: 0 /100 WBC (ref 0–0.2)
PH UR STRIP.AUTO: 6.5 [PH] (ref 5–8)
PLATELET # BLD AUTO: 417 10*3/MM3 (ref 140–450)
PMV BLD AUTO: 10.7 FL (ref 6–12)
POTASSIUM SERPL-SCNC: 4.1 MMOL/L (ref 3.5–5.2)
PROT SERPL-MCNC: 7 G/DL (ref 6–8.5)
PROT UR QL STRIP: ABNORMAL
RBC # BLD AUTO: 4.78 10*6/MM3 (ref 3.77–5.28)
RBC # UR STRIP: NORMAL /HPF
REF LAB TEST METHOD: NORMAL
RH BLD: POSITIVE
SODIUM SERPL-SCNC: 141 MMOL/L (ref 136–145)
SP GR UR STRIP: 1.03 (ref 1–1.03)
SQUAMOUS #/AREA URNS HPF: NORMAL /HPF
T&S EXPIRATION DATE: NORMAL
TRIGL SERPL-MCNC: 88 MG/DL (ref 0–150)
TSH SERPL DL<=0.05 MIU/L-ACNC: 0.36 UIU/ML (ref 0.27–4.2)
UROBILINOGEN UR QL STRIP: ABNORMAL
VLDLC SERPL-MCNC: 15 MG/DL (ref 5–40)
WBC # UR STRIP: NORMAL /HPF
WBC NRBC COR # BLD AUTO: 9.52 10*3/MM3 (ref 3.4–10.8)

## 2024-06-12 PROCEDURE — 84443 ASSAY THYROID STIM HORMONE: CPT | Performed by: NURSE PRACTITIONER

## 2024-06-12 PROCEDURE — 36415 COLL VENOUS BLD VENIPUNCTURE: CPT

## 2024-06-12 PROCEDURE — 83735 ASSAY OF MAGNESIUM: CPT

## 2024-06-12 PROCEDURE — 86900 BLOOD TYPING SEROLOGIC ABO: CPT

## 2024-06-12 PROCEDURE — 80053 COMPREHEN METABOLIC PANEL: CPT

## 2024-06-12 PROCEDURE — 81001 URINALYSIS AUTO W/SCOPE: CPT

## 2024-06-12 PROCEDURE — 86901 BLOOD TYPING SEROLOGIC RH(D): CPT

## 2024-06-12 PROCEDURE — 85025 COMPLETE CBC W/AUTO DIFF WBC: CPT

## 2024-06-12 PROCEDURE — 87641 MR-STAPH DNA AMP PROBE: CPT

## 2024-06-12 PROCEDURE — 80061 LIPID PANEL: CPT

## 2024-06-12 PROCEDURE — 83036 HEMOGLOBIN GLYCOSYLATED A1C: CPT

## 2024-06-12 PROCEDURE — 86850 RBC ANTIBODY SCREEN: CPT

## 2024-06-12 RX ORDER — MULTIPLE VITAMINS W/ MINERALS TAB 9MG-400MCG
1 TAB ORAL DAILY
Status: ON HOLD | COMMUNITY

## 2024-06-13 ENCOUNTER — HOSPITAL ENCOUNTER (OUTPATIENT)
Dept: CARDIOLOGY | Facility: HOSPITAL | Age: 69
Discharge: HOME | End: 2024-06-13
Admitting: INTERNAL MEDICINE
Payer: MEDICARE

## 2024-06-13 VITALS
DIASTOLIC BLOOD PRESSURE: 80 MMHG | BODY MASS INDEX: 31.28 KG/M2 | SYSTOLIC BLOOD PRESSURE: 130 MMHG | WEIGHT: 170 LBS | HEIGHT: 62 IN

## 2024-06-13 DIAGNOSIS — R94.31 ABNORMAL EKG: ICD-10-CM

## 2024-06-13 LAB
BH CV ECHO MEAS - ACS: 1.76 CM
BH CV ECHO MEAS - AI P1/2T: 506.7 MSEC
BH CV ECHO MEAS - AO MAX PG: 10.8 MMHG
BH CV ECHO MEAS - AO MEAN PG: 6 MMHG
BH CV ECHO MEAS - AO ROOT DIAM: 3.7 CM
BH CV ECHO MEAS - AO V2 MAX: 164.7 CM/SEC
BH CV ECHO MEAS - AO V2 VTI: 33.6 CM
BH CV ECHO MEAS - AVA(I,D): 1.73 CM2
BH CV ECHO MEAS - EDV(CUBED): 119.9 ML
BH CV ECHO MEAS - EDV(MOD-SP4): 65.4 ML
BH CV ECHO MEAS - EF(MOD-BP): 62 %
BH CV ECHO MEAS - EF(MOD-SP4): 61.6 %
BH CV ECHO MEAS - ESV(CUBED): 34.9 ML
BH CV ECHO MEAS - ESV(MOD-SP4): 25.1 ML
BH CV ECHO MEAS - FS: 33.7 %
BH CV ECHO MEAS - IVS/LVPW: 0.91 CM
BH CV ECHO MEAS - IVSD: 1.12 CM
BH CV ECHO MEAS - LA DIMENSION: 3.6 CM
BH CV ECHO MEAS - LV MASS(C)D: 221.6 GRAMS
BH CV ECHO MEAS - LV MAX PG: 4.3 MMHG
BH CV ECHO MEAS - LV MEAN PG: 2.21 MMHG
BH CV ECHO MEAS - LV V1 MAX: 103.4 CM/SEC
BH CV ECHO MEAS - LV V1 VTI: 19.1 CM
BH CV ECHO MEAS - LVIDD: 4.9 CM
BH CV ECHO MEAS - LVIDS: 3.3 CM
BH CV ECHO MEAS - LVOT AREA: 3 CM2
BH CV ECHO MEAS - LVOT DIAM: 1.97 CM
BH CV ECHO MEAS - LVPWD: 1.23 CM
BH CV ECHO MEAS - MR MAX PG: 83.7 MMHG
BH CV ECHO MEAS - MR MAX VEL: 456.2 CM/SEC
BH CV ECHO MEAS - MV A MAX VEL: 78.8 CM/SEC
BH CV ECHO MEAS - MV DEC SLOPE: 512.8 CM/SEC2
BH CV ECHO MEAS - MV DEC TIME: 0.17 SEC
BH CV ECHO MEAS - MV E MAX VEL: 85.3 CM/SEC
BH CV ECHO MEAS - MV E/A: 1.08
BH CV ECHO MEAS - MV MAX PG: 4.9 MMHG
BH CV ECHO MEAS - MV MEAN PG: 2.6 MMHG
BH CV ECHO MEAS - MV V2 VTI: 30.6 CM
BH CV ECHO MEAS - MVA(VTI): 1.91 CM2
BH CV ECHO MEAS - PA ACC TIME: 0.1 SEC
BH CV ECHO MEAS - PA V2 MAX: 112.9 CM/SEC
BH CV ECHO MEAS - PULM A REVS DUR: 0.1 SEC
BH CV ECHO MEAS - PULM A REVS VEL: 32.8 CM/SEC
BH CV ECHO MEAS - PULM DIAS VEL: 40.9 CM/SEC
BH CV ECHO MEAS - PULM S/D: 1.55
BH CV ECHO MEAS - PULM SYS VEL: 63.4 CM/SEC
BH CV ECHO MEAS - RAP SYSTOLE: 3 MMHG
BH CV ECHO MEAS - RV MAX PG: 3.3 MMHG
BH CV ECHO MEAS - RV V1 MAX: 91 CM/SEC
BH CV ECHO MEAS - RV V1 VTI: 20.5 CM
BH CV ECHO MEAS - RVDD: 2.6 CM
BH CV ECHO MEAS - RVSP: 21.2 MMHG
BH CV ECHO MEAS - SV(LVOT): 58.3 ML
BH CV ECHO MEAS - SV(MOD-SP4): 40.3 ML
BH CV ECHO MEAS - TR MAX PG: 18.2 MMHG
BH CV ECHO MEAS - TR MAX VEL: 212.6 CM/SEC

## 2024-06-13 PROCEDURE — 93356 MYOCRD STRAIN IMG SPCKL TRCK: CPT

## 2024-06-13 PROCEDURE — 93306 TTE W/DOPPLER COMPLETE: CPT

## 2024-06-13 RX ORDER — TRAMADOL HYDROCHLORIDE 50 MG/1
50 TABLET ORAL EVERY 12 HOURS PRN
Qty: 30 TABLET | Refills: 0 | Status: SHIPPED | OUTPATIENT
Start: 2024-06-13

## 2024-06-17 DIAGNOSIS — E03.9 HYPOTHYROIDISM, UNSPECIFIED TYPE: ICD-10-CM

## 2024-06-17 RX ORDER — LEVOTHYROXINE SODIUM 175 UG/1
TABLET ORAL EVERY MORNING
Qty: 90 TABLET | Refills: 1 | Status: ON HOLD | OUTPATIENT
Start: 2024-06-17

## 2024-06-20 DIAGNOSIS — Z96.641 AFTERCARE FOLLOWING RIGHT HIP JOINT REPLACEMENT SURGERY: Primary | ICD-10-CM

## 2024-06-20 DIAGNOSIS — Z47.1 AFTERCARE FOLLOWING RIGHT HIP JOINT REPLACEMENT SURGERY: Primary | ICD-10-CM

## 2024-06-24 ENCOUNTER — APPOINTMENT (OUTPATIENT)
Dept: GENERAL RADIOLOGY | Facility: HOSPITAL | Age: 69
End: 2024-06-24
Payer: MEDICARE

## 2024-06-24 ENCOUNTER — HOSPITAL ENCOUNTER (OUTPATIENT)
Facility: HOSPITAL | Age: 69
Discharge: HOME | End: 2024-06-25
Attending: INTERNAL MEDICINE | Admitting: INTERNAL MEDICINE
Payer: MEDICARE

## 2024-06-24 ENCOUNTER — ANESTHESIA EVENT (OUTPATIENT)
Dept: PERIOP | Facility: HOSPITAL | Age: 69
End: 2024-06-24
Payer: MEDICARE

## 2024-06-24 ENCOUNTER — ANESTHESIA (OUTPATIENT)
Dept: PERIOP | Facility: HOSPITAL | Age: 69
End: 2024-06-24
Payer: MEDICARE

## 2024-06-24 DIAGNOSIS — Z96.642 S/P HIP REPLACEMENT, LEFT: Primary | ICD-10-CM

## 2024-06-24 DIAGNOSIS — M16.12 PRIMARY OSTEOARTHRITIS OF LEFT HIP: ICD-10-CM

## 2024-06-24 PROCEDURE — 25010000002 EPINEPHRINE 1 MG/ML SOLUTION: Performed by: INTERNAL MEDICINE

## 2024-06-24 PROCEDURE — 25010000002 VANCOMYCIN HCL 1.25 G RECONSTITUTED SOLUTION 1 EACH VIAL: Performed by: INTERNAL MEDICINE

## 2024-06-24 PROCEDURE — 25010000002 PROPOFOL 200 MG/20ML EMULSION: Performed by: NURSE ANESTHETIST, CERTIFIED REGISTERED

## 2024-06-24 PROCEDURE — A9270 NON-COVERED ITEM OR SERVICE: HCPCS | Performed by: INTERNAL MEDICINE

## 2024-06-24 PROCEDURE — 27130 TOTAL HIP ARTHROPLASTY: CPT | Performed by: INTERNAL MEDICINE

## 2024-06-24 PROCEDURE — 25010000002 MIDAZOLAM PER 1 MG: Performed by: ANESTHESIOLOGY

## 2024-06-24 PROCEDURE — 25010000002 KETOROLAC TROMETHAMINE PER 15 MG: Performed by: INTERNAL MEDICINE

## 2024-06-24 PROCEDURE — C1713 ANCHOR/SCREW BN/BN,TIS/BN: HCPCS | Performed by: INTERNAL MEDICINE

## 2024-06-24 PROCEDURE — G0378 HOSPITAL OBSERVATION PER HR: HCPCS

## 2024-06-24 PROCEDURE — C1776 JOINT DEVICE (IMPLANTABLE): HCPCS | Performed by: INTERNAL MEDICINE

## 2024-06-24 PROCEDURE — 25810000003 LACTATED RINGERS PER 1000 ML: Performed by: ANESTHESIOLOGY

## 2024-06-24 PROCEDURE — 63710000001 HYDROCODONE-ACETAMINOPHEN 10-325 MG TABLET: Performed by: INTERNAL MEDICINE

## 2024-06-24 PROCEDURE — 73501 X-RAY EXAM HIP UNI 1 VIEW: CPT

## 2024-06-24 PROCEDURE — 25810000003 SODIUM CHLORIDE 0.9 % SOLUTION 250 ML FLEX CONT: Performed by: INTERNAL MEDICINE

## 2024-06-24 PROCEDURE — 63710000001 MELOXICAM 15 MG TABLET: Performed by: INTERNAL MEDICINE

## 2024-06-24 PROCEDURE — 25010000002 CEFAZOLIN PER 500 MG: Performed by: INTERNAL MEDICINE

## 2024-06-24 PROCEDURE — 25010000002 CLONIDINE PER 1 MG: Performed by: INTERNAL MEDICINE

## 2024-06-24 PROCEDURE — 25010000002 ROPIVACAINE PER 1 MG: Performed by: INTERNAL MEDICINE

## 2024-06-24 PROCEDURE — 25810000003 LACTATED RINGERS SOLUTION: Performed by: INTERNAL MEDICINE

## 2024-06-24 PROCEDURE — 25010000002 FENTANYL CITRATE (PF) 100 MCG/2ML SOLUTION: Performed by: ANESTHESIOLOGY

## 2024-06-24 PROCEDURE — 76000 FLUOROSCOPY <1 HR PHYS/QHP: CPT

## 2024-06-24 PROCEDURE — 97162 PT EVAL MOD COMPLEX 30 MIN: CPT

## 2024-06-24 PROCEDURE — 63710000001 PREGABALIN 75 MG CAPSULE: Performed by: INTERNAL MEDICINE

## 2024-06-24 PROCEDURE — 25010000002 BUPIVACAINE PF 0.75 % SOLUTION: Performed by: ANESTHESIOLOGY

## 2024-06-24 PROCEDURE — 72170 X-RAY EXAM OF PELVIS: CPT

## 2024-06-24 DEVICE — DEV CONTRL TISS STRATAFIX SPIRAL MNCRYL UD 3/0 PLS 30CM: Type: IMPLANTABLE DEVICE | Site: HIP | Status: FUNCTIONAL

## 2024-06-24 DEVICE — OR3O DUAL MOBILITY LINER 40/52
Type: IMPLANTABLE DEVICE | Site: HIP | Status: FUNCTIONAL
Brand: OR3O DUAL MOBILITY

## 2024-06-24 DEVICE — DEV WND/CLS CONTRL TISS STRATAFIX SYMM PDS PLS CTX 60CM VIL: Type: IMPLANTABLE DEVICE | Site: HIP | Status: FUNCTIONAL

## 2024-06-24 DEVICE — OR3O DUAL MOBILITY XLPE INSERT 28/40
Type: IMPLANTABLE DEVICE | Site: HIP | Status: FUNCTIONAL
Brand: OR3O DUAL MOBILITY

## 2024-06-24 DEVICE — REFLECTION SPHERICAL HEAD SCREW 30MM
Type: IMPLANTABLE DEVICE | Site: HIP | Status: FUNCTIONAL
Brand: REFLECTION

## 2024-06-24 DEVICE — R3 3 HOLE ACETABULAR SHELL 52MM
Type: IMPLANTABLE DEVICE | Site: HIP | Status: FUNCTIONAL
Brand: R3 ACETABULAR

## 2024-06-24 DEVICE — REFLECTION SPHERICAL HEAD SCREW 25MM
Type: IMPLANTABLE DEVICE | Site: HIP | Status: FUNCTIONAL
Brand: REFLECTION

## 2024-06-24 DEVICE — IMPLANTABLE DEVICE: Type: IMPLANTABLE DEVICE | Site: HIP | Status: FUNCTIONAL

## 2024-06-24 DEVICE — POLARSTEM COLLAR STANDARD                                    NON-CEMENTED WITH TI/HA 1
Type: IMPLANTABLE DEVICE | Site: HIP | Status: FUNCTIONAL
Brand: POLARSTEM

## 2024-06-24 DEVICE — OXINIUM FEMORAL HEAD 12/14 TAPER                                    28 MM +0
Type: IMPLANTABLE DEVICE | Site: HIP | Status: FUNCTIONAL
Brand: OXINIUM

## 2024-06-24 RX ORDER — MIDAZOLAM HYDROCHLORIDE 1 MG/ML
INJECTION INTRAMUSCULAR; INTRAVENOUS AS NEEDED
Status: DISCONTINUED | OUTPATIENT
Start: 2024-06-24 | End: 2024-06-24 | Stop reason: SURG

## 2024-06-24 RX ORDER — NALOXONE HCL 0.4 MG/ML
0.4 VIAL (ML) INJECTION
Status: DISCONTINUED | OUTPATIENT
Start: 2024-06-24 | End: 2024-06-25 | Stop reason: HOSPADM

## 2024-06-24 RX ORDER — KETAMINE HCL IN NACL, ISO-OSM 100MG/10ML
SYRINGE (ML) INJECTION AS NEEDED
Status: DISCONTINUED | OUTPATIENT
Start: 2024-06-24 | End: 2024-06-24 | Stop reason: SURG

## 2024-06-24 RX ORDER — ATENOLOL 25 MG/1
25 TABLET ORAL DAILY
Status: DISCONTINUED | OUTPATIENT
Start: 2024-06-24 | End: 2024-06-25 | Stop reason: HOSPADM

## 2024-06-24 RX ORDER — DIPHENHYDRAMINE HYDROCHLORIDE 50 MG/ML
12.5 INJECTION INTRAMUSCULAR; INTRAVENOUS ONCE AS NEEDED
Status: DISCONTINUED | OUTPATIENT
Start: 2024-06-24 | End: 2024-06-24 | Stop reason: HOSPADM

## 2024-06-24 RX ORDER — PHENYLEPHRINE HCL IN 0.9% NACL 1 MG/10 ML
SYRINGE (ML) INTRAVENOUS AS NEEDED
Status: DISCONTINUED | OUTPATIENT
Start: 2024-06-24 | End: 2024-06-24 | Stop reason: SURG

## 2024-06-24 RX ORDER — EPHEDRINE SULFATE 5 MG/ML
INJECTION INTRAVENOUS AS NEEDED
Status: DISCONTINUED | OUTPATIENT
Start: 2024-06-24 | End: 2024-06-24 | Stop reason: SURG

## 2024-06-24 RX ORDER — FENTANYL CITRATE 50 UG/ML
INJECTION, SOLUTION INTRAMUSCULAR; INTRAVENOUS AS NEEDED
Status: DISCONTINUED | OUTPATIENT
Start: 2024-06-24 | End: 2024-06-24 | Stop reason: SURG

## 2024-06-24 RX ORDER — SODIUM CHLORIDE 0.9 % (FLUSH) 0.9 %
10 SYRINGE (ML) INJECTION AS NEEDED
Status: DISCONTINUED | OUTPATIENT
Start: 2024-06-24 | End: 2024-06-24 | Stop reason: HOSPADM

## 2024-06-24 RX ORDER — ASPIRIN 81 MG/1
81 TABLET ORAL DAILY
Qty: 60 TABLET | Refills: 0 | Status: SHIPPED | OUTPATIENT
Start: 2024-06-24

## 2024-06-24 RX ORDER — SULFAMETHOXAZOLE AND TRIMETHOPRIM 800; 160 MG/1; MG/1
1 TABLET ORAL 2 TIMES DAILY
Qty: 14 TABLET | Refills: 0 | Status: SHIPPED | OUTPATIENT
Start: 2024-06-24 | End: 2024-07-02

## 2024-06-24 RX ORDER — MELOXICAM 15 MG/1
15 TABLET ORAL ONCE
Status: COMPLETED | OUTPATIENT
Start: 2024-06-24 | End: 2024-06-24

## 2024-06-24 RX ORDER — ASPIRIN 81 MG/1
81 TABLET ORAL EVERY 12 HOURS SCHEDULED
Status: DISCONTINUED | OUTPATIENT
Start: 2024-06-25 | End: 2024-06-25 | Stop reason: HOSPADM

## 2024-06-24 RX ORDER — AMOXICILLIN 250 MG
1 CAPSULE ORAL DAILY
Qty: 30 TABLET | Refills: 0 | Status: SHIPPED | OUTPATIENT
Start: 2024-06-24

## 2024-06-24 RX ORDER — ONDANSETRON 2 MG/ML
4 INJECTION INTRAMUSCULAR; INTRAVENOUS ONCE AS NEEDED
Status: DISCONTINUED | OUTPATIENT
Start: 2024-06-24 | End: 2024-06-24 | Stop reason: HOSPADM

## 2024-06-24 RX ORDER — HYDROCODONE BITARTRATE AND ACETAMINOPHEN 5; 325 MG/1; MG/1
1 TABLET ORAL EVERY 4 HOURS PRN
Status: DISCONTINUED | OUTPATIENT
Start: 2024-06-24 | End: 2024-06-25 | Stop reason: HOSPADM

## 2024-06-24 RX ORDER — HYDRALAZINE HYDROCHLORIDE 25 MG/1
50 TABLET, FILM COATED ORAL 2 TIMES DAILY
Status: DISCONTINUED | OUTPATIENT
Start: 2024-06-24 | End: 2024-06-25 | Stop reason: HOSPADM

## 2024-06-24 RX ORDER — TRANEXAMIC ACID 10 MG/ML
1000 INJECTION, SOLUTION INTRAVENOUS ONCE
Status: DISCONTINUED | OUTPATIENT
Start: 2024-06-24 | End: 2024-06-24 | Stop reason: HOSPADM

## 2024-06-24 RX ORDER — IPRATROPIUM BROMIDE AND ALBUTEROL SULFATE 2.5; .5 MG/3ML; MG/3ML
3 SOLUTION RESPIRATORY (INHALATION) ONCE AS NEEDED
Status: DISCONTINUED | OUTPATIENT
Start: 2024-06-24 | End: 2024-06-24 | Stop reason: HOSPADM

## 2024-06-24 RX ORDER — HYDRALAZINE HYDROCHLORIDE 20 MG/ML
5 INJECTION INTRAMUSCULAR; INTRAVENOUS
Status: DISCONTINUED | OUTPATIENT
Start: 2024-06-24 | End: 2024-06-24 | Stop reason: HOSPADM

## 2024-06-24 RX ORDER — DIPHENHYDRAMINE HYDROCHLORIDE 50 MG/ML
12.5 INJECTION INTRAMUSCULAR; INTRAVENOUS
Status: DISCONTINUED | OUTPATIENT
Start: 2024-06-24 | End: 2024-06-24 | Stop reason: HOSPADM

## 2024-06-24 RX ORDER — LABETALOL HYDROCHLORIDE 5 MG/ML
5 INJECTION, SOLUTION INTRAVENOUS
Status: DISCONTINUED | OUTPATIENT
Start: 2024-06-24 | End: 2024-06-24 | Stop reason: HOSPADM

## 2024-06-24 RX ORDER — OXYCODONE HYDROCHLORIDE AND ACETAMINOPHEN 5; 325 MG/1; MG/1
1 TABLET ORAL EVERY 4 HOURS PRN
Qty: 30 TABLET | Refills: 0 | Status: SHIPPED | OUTPATIENT
Start: 2024-06-24 | End: 2024-07-01 | Stop reason: SDUPTHER

## 2024-06-24 RX ORDER — ONDANSETRON 4 MG/1
4 TABLET, ORALLY DISINTEGRATING ORAL EVERY 6 HOURS PRN
Status: DISCONTINUED | OUTPATIENT
Start: 2024-06-24 | End: 2024-06-25 | Stop reason: HOSPADM

## 2024-06-24 RX ORDER — BUPIVACAINE HYDROCHLORIDE 7.5 MG/ML
INJECTION, SOLUTION EPIDURAL; RETROBULBAR AS NEEDED
Status: DISCONTINUED | OUTPATIENT
Start: 2024-06-24 | End: 2024-06-24 | Stop reason: SURG

## 2024-06-24 RX ORDER — SODIUM CHLORIDE, SODIUM LACTATE, POTASSIUM CHLORIDE, CALCIUM CHLORIDE 600; 310; 30; 20 MG/100ML; MG/100ML; MG/100ML; MG/100ML
1000 INJECTION, SOLUTION INTRAVENOUS CONTINUOUS
Status: DISCONTINUED | OUTPATIENT
Start: 2024-06-24 | End: 2024-06-25 | Stop reason: HOSPADM

## 2024-06-24 RX ORDER — LIDOCAINE HYDROCHLORIDE 10 MG/ML
0.5 INJECTION, SOLUTION INFILTRATION; PERINEURAL ONCE AS NEEDED
Status: DISCONTINUED | OUTPATIENT
Start: 2024-06-24 | End: 2024-06-24 | Stop reason: HOSPADM

## 2024-06-24 RX ORDER — FLUMAZENIL 0.1 MG/ML
0.2 INJECTION INTRAVENOUS AS NEEDED
Status: DISCONTINUED | OUTPATIENT
Start: 2024-06-24 | End: 2024-06-24 | Stop reason: HOSPADM

## 2024-06-24 RX ORDER — PANTOPRAZOLE SODIUM 40 MG/1
40 TABLET, DELAYED RELEASE ORAL
Status: DISCONTINUED | OUTPATIENT
Start: 2024-06-25 | End: 2024-06-25 | Stop reason: HOSPADM

## 2024-06-24 RX ORDER — PROPOFOL 10 MG/ML
INJECTION, EMULSION INTRAVENOUS AS NEEDED
Status: DISCONTINUED | OUTPATIENT
Start: 2024-06-24 | End: 2024-06-24 | Stop reason: SURG

## 2024-06-24 RX ORDER — PREGABALIN 75 MG/1
150 CAPSULE ORAL ONCE
Status: COMPLETED | OUTPATIENT
Start: 2024-06-24 | End: 2024-06-24

## 2024-06-24 RX ORDER — HYDROCODONE BITARTRATE AND ACETAMINOPHEN 10; 325 MG/1; MG/1
1 TABLET ORAL EVERY 4 HOURS PRN
Status: DISCONTINUED | OUTPATIENT
Start: 2024-06-24 | End: 2024-06-25 | Stop reason: HOSPADM

## 2024-06-24 RX ORDER — FENTANYL CITRATE 50 UG/ML
50 INJECTION, SOLUTION INTRAMUSCULAR; INTRAVENOUS
Status: DISCONTINUED | OUTPATIENT
Start: 2024-06-24 | End: 2024-06-24 | Stop reason: HOSPADM

## 2024-06-24 RX ORDER — ONDANSETRON 4 MG/1
4 TABLET, FILM COATED ORAL EVERY 8 HOURS PRN
Qty: 15 TABLET | Refills: 0 | Status: SHIPPED | OUTPATIENT
Start: 2024-06-24

## 2024-06-24 RX ORDER — NALOXONE HCL 0.4 MG/ML
0.4 VIAL (ML) INJECTION AS NEEDED
Status: DISCONTINUED | OUTPATIENT
Start: 2024-06-24 | End: 2024-06-24 | Stop reason: HOSPADM

## 2024-06-24 RX ORDER — MORPHINE SULFATE 2 MG/ML
2 INJECTION, SOLUTION INTRAMUSCULAR; INTRAVENOUS
Status: DISCONTINUED | OUTPATIENT
Start: 2024-06-24 | End: 2024-06-25 | Stop reason: HOSPADM

## 2024-06-24 RX ORDER — TRANEXAMIC ACID 10 MG/ML
1000 INJECTION, SOLUTION INTRAVENOUS ONCE
Status: COMPLETED | OUTPATIENT
Start: 2024-06-24 | End: 2024-06-24

## 2024-06-24 RX ORDER — ONDANSETRON 2 MG/ML
4 INJECTION INTRAMUSCULAR; INTRAVENOUS EVERY 6 HOURS PRN
Status: DISCONTINUED | OUTPATIENT
Start: 2024-06-24 | End: 2024-06-25 | Stop reason: HOSPADM

## 2024-06-24 RX ORDER — EPHEDRINE SULFATE 5 MG/ML
5 INJECTION INTRAVENOUS ONCE AS NEEDED
Status: DISCONTINUED | OUTPATIENT
Start: 2024-06-24 | End: 2024-06-24 | Stop reason: HOSPADM

## 2024-06-24 RX ORDER — HYDROCHLOROTHIAZIDE 25 MG/1
25 TABLET ORAL DAILY
Status: DISCONTINUED | OUTPATIENT
Start: 2024-06-24 | End: 2024-06-25 | Stop reason: HOSPADM

## 2024-06-24 RX ADMIN — SODIUM CHLORIDE, SODIUM LACTATE, POTASSIUM CHLORIDE, AND CALCIUM CHLORIDE 1000 ML: .6; .31; .03; .02 INJECTION, SOLUTION INTRAVENOUS at 09:03

## 2024-06-24 RX ADMIN — FENTANYL CITRATE 85 MCG: 50 INJECTION, SOLUTION INTRAMUSCULAR; INTRAVENOUS at 10:20

## 2024-06-24 RX ADMIN — Medication 40 MG: at 11:11

## 2024-06-24 RX ADMIN — SODIUM CHLORIDE 2000 MG: 900 INJECTION INTRAVENOUS at 19:04

## 2024-06-24 RX ADMIN — Medication 100 MCG: at 11:56

## 2024-06-24 RX ADMIN — EPHEDRINE SULFATE 10 MG: 5 INJECTION INTRAVENOUS at 11:50

## 2024-06-24 RX ADMIN — SODIUM CHLORIDE 2 G: 900 INJECTION INTRAVENOUS at 11:02

## 2024-06-24 RX ADMIN — EPHEDRINE SULFATE 10 MG: 5 INJECTION INTRAVENOUS at 11:32

## 2024-06-24 RX ADMIN — MELOXICAM 15 MG: 15 TABLET ORAL at 09:03

## 2024-06-24 RX ADMIN — Medication 150 MCG: at 12:26

## 2024-06-24 RX ADMIN — EPHEDRINE SULFATE 10 MG: 5 INJECTION INTRAVENOUS at 11:26

## 2024-06-24 RX ADMIN — Medication 100 MCG: at 12:17

## 2024-06-24 RX ADMIN — EPHEDRINE SULFATE 10 MG: 5 INJECTION INTRAVENOUS at 11:23

## 2024-06-24 RX ADMIN — Medication 10 MG: at 11:10

## 2024-06-24 RX ADMIN — BUPIVACAINE HYDROCHLORIDE 1.6 ML: 7.5 INJECTION, SOLUTION EPIDURAL; RETROBULBAR at 10:27

## 2024-06-24 RX ADMIN — SODIUM CHLORIDE, POTASSIUM CHLORIDE, SODIUM LACTATE AND CALCIUM CHLORIDE 500 ML: 600; 310; 30; 20 INJECTION, SOLUTION INTRAVENOUS at 09:00

## 2024-06-24 RX ADMIN — Medication 150 MCG: at 12:02

## 2024-06-24 RX ADMIN — Medication 100 MCG: at 11:38

## 2024-06-24 RX ADMIN — VANCOMYCIN HYDROCHLORIDE 1250 MG: 1.25 INJECTION, POWDER, LYOPHILIZED, FOR SOLUTION INTRAVENOUS at 08:59

## 2024-06-24 RX ADMIN — PREGABALIN 150 MG: 75 CAPSULE ORAL at 09:03

## 2024-06-24 RX ADMIN — HYDROCODONE BITARTRATE AND ACETAMINOPHEN 1 TABLET: 10; 325 TABLET ORAL at 14:45

## 2024-06-24 RX ADMIN — PROPOFOL 40 MG: 10 INJECTION, EMULSION INTRAVENOUS at 11:10

## 2024-06-24 RX ADMIN — Medication 100 MCG: at 11:44

## 2024-06-24 RX ADMIN — MIDAZOLAM 2 MG: 1 INJECTION INTRAMUSCULAR; INTRAVENOUS at 10:20

## 2024-06-24 RX ADMIN — TRANEXAMIC ACID 1000 MG: 10 INJECTION, SOLUTION INTRAVENOUS at 11:10

## 2024-06-24 RX ADMIN — TRANEXAMIC ACID 1000 MG: 10 INJECTION, SOLUTION INTRAVENOUS at 12:21

## 2024-06-24 RX ADMIN — PROPOFOL 100 MCG/KG/MIN: 10 INJECTION, EMULSION INTRAVENOUS at 11:11

## 2024-06-24 RX ADMIN — HYDROCODONE BITARTRATE AND ACETAMINOPHEN 1 TABLET: 10; 325 TABLET ORAL at 19:03

## 2024-06-24 RX ADMIN — FENTANYL CITRATE 15 MCG: 50 INJECTION, SOLUTION INTRAMUSCULAR; INTRAVENOUS at 10:27

## 2024-06-24 RX ADMIN — Medication 150 MCG: at 12:11

## 2024-06-24 NOTE — PLAN OF CARE
Goal Outcome Evaluation:  Plan of Care Reviewed With: patient, spouse        Progress: improving  Outcome Evaluation: Benrice Barrow is a 68 y/o F with end-stage L hip arthritis admitted to [facility] on 6/24/24 and is now POD#0 s/p L anterior [place] with Dr. Comer. At baseline, pt lives with her spouse in [place] with [place]STE. Pt is IND with ADLs at baseline, using RW more than cane for mobility. Edu pt regarding post-op ther ex and precautions. Pt demo good quad and glut control. Pt was SBA for STS and CGA for ambulation x 100 feet with RW. Pt able to progress to reciprocal pattern with increased distance. Pt and spouse report she is current with OPPT, starting on Wednesday, and they already have AD at home. PT will f/u tomorrow morning for further exercise and stair training. Pt safe to d/c home with family assist when appropriate.      Anticipated Discharge Disposition (PT): home with outpatient therapy services

## 2024-06-24 NOTE — ANESTHESIA PROCEDURE NOTES
Peripheral IV    Start time: 6/24/2024 11:12 AM  End time: 6/24/2024 11:15 AM  Line placed for Difficult Access, Fluids/Medication Admin and Sedation.  Performed By   CRNA/CAA: Renzo Goldman CRNA  Preanesthetic Checklist  Completed: patient identified, IV checked, monitors and equipment checked and pre-op evaluation  Peripheral IV Prep   Patient position: sitting   Prep: ChloraPrep  Patient monitoring: heart rate, cardiac monitor and continuous pulse ox  Peripheral IV Procedure   Laterality:left  Location:  Forearm  Catheter size: 20 G          Post Assessment   Dressing Type: tape and transparent.    IV Dressing/Site: clean, dry and intact

## 2024-06-24 NOTE — ADDENDUM NOTE
Addendum  created 06/24/24 1406 by Emmett Robles MD    Attestation recorded in Intraprocedure, Intraprocedure Attestations filed

## 2024-06-24 NOTE — ANESTHESIA POSTPROCEDURE EVALUATION
Patient: Bernice Barrow    Procedure Summary       Date: 06/24/24 Room / Location: [facility] OR 11 / [facility] MAIN OR    Anesthesia Start: 1105 Anesthesia Stop: 1241    Procedure: TOTAL HIP ARTHROPLASTY ANTERIOR (Left: Hip) Diagnosis:       Primary osteoarthritis of left hip      (Primary osteoarthritis of left hip [M16.12])    Surgeons: Alvaro Comer MD Provider: Emmett Robles MD    Anesthesia Type: MAC, spinal ASA Status: 2            Anesthesia Type: MAC, spinal    Vitals  Vitals Value Taken Time   /54 06/24/24 1400   Temp 97 °F (36.1 °C) 06/24/24 1241   Pulse 75 06/24/24 1405   Resp 12 06/24/24 1341   SpO2 96 % 06/24/24 1405   Vitals shown include unfiled device data.        Post Anesthesia Care and Evaluation    Patient location during evaluation: PACU  Patient participation: complete - patient participated  Level of consciousness: awake  Pain scale: See nurse's notes for pain score.  Pain management: adequate    Airway patency: patent  Anesthetic complications: No anesthetic complications  PONV Status: none  Cardiovascular status: acceptable  Respiratory status: acceptable and spontaneous ventilation  Hydration status: acceptable    Comments: Patient seen and examined postoperatively; vital signs stable; SpO2 greater than or equal to 90%; cardiopulmonary status stable; nausea/vomiting adequately controlled; pain adequately controlled; no apparent anesthesia complications; patient discharged from anesthesia care when discharge criteria were met

## 2024-06-24 NOTE — OP NOTE
OPERATIVE REPORT    Date of Surgery:   06/24/24    Attending Surgeon:   Alvaro Comer MD, MSE    ASSISTANTS:   Assistant: Omid Mccallum CSA was responsible for performing the following activities: Retraction, Suction, Closing, and Placing Dressing and their skilled assistance was necessary for the success of this case.     ANESTHESIA:   Spinal    PREOPERATIVE DIAGNOSIS:   Left hip osteoarthritis    POSTOPERATIVE DIAGNOSIS:   Same as above     PROCEDURE:   1. Left primary total hip arthroplasty    SURGICAL APPROACH:        IMPLANTS:   Mike and Nephew R3 cup coating, cluster Acetabular Shell, 52 mm OD  2. S&N 6.5 mm acetabular screws, 2 25 and 30 mm  3. Mike and Nephew acetabular liner, Dual Mobility   4.  Smith and Nephew Polar Stem  standard offset, size 1  5.Femoral Head, Size 28, Neck length 0  6. Dual mobility outer ball    CLOSURE:  #5 Ethibond  #1 PDS STRATAFIX  2-0 Monocryl STRATAFIX  3-0 Monocryl STRATAFIX  Exofin glue    INDICATIONS:   This patient has a history of Left end stage hip osteoarthritis. The patient has failed conservative treatment. She is therefore indicated for total hip arthroplasty.  Risks of surgery, including pain, bleeding, infection, scar, injury to nearby neurovascular structures, fracture, venous thromboembolism, limb length discrepancy, dislocation, need for more procedures, implant failure, implant loosening, and implant subsidence were discussed preoperatively.  The risks of anesthesia including heart attack, stroke, blood clots and even death were discussed. The benefits of surgery including pain relief and restoration of function were discussed. Alternatives to surgery, including nonoperative care were also discussed and the patient elects to proceed with [place].    OPERATIVE FINDINGS:  Left full thickness cartilage loss on the femoral head and acetabulum and osteophytes    DESCRIPTION OF PROCEDURE:   The patient was met in the preoperative holding area, evaluated, consent  was obtained, and the Left hip was marked.  The patient was brought to the operating room and placed on the operating room table in the supine position.  After anesthesia was established, the patient was prepped and draped in the usual sterile fashion. All bony prominences and peripheral nerves were padded.  A surgical time out was taken to confirm the operative side and details of the operative procedure.  The patient was given prophylactic antibiotics prior to skin incision.      We utilized an anterior oblique incision and the Mike-Robert interval to access the hip. An oblique anterior incision was made starting 2 centimeters lateral and distal to the anterior-superior iliac spine and extending distal and slight lateral in line with the tensor fascia suzanne.  The position of the incision was confirmed with fluoroscopy prior to incision. This incision was carried through subcutaneous tissue to the underlying fascia of the tensor fascia suzanne muscle.  The fascia was incised.  The tensor fascia muscle belly was reflected superolaterally with a curved cobra retractor over the femoral neck.  The rectus was reflected medially with a curved Cobra retractor, inferior to the femoral neck.  A third retractor was carefully placed under the rectus onto the anterior hip capsule directly on bone.    The anterior hip capsule was exposed.  The reflected head of the rectus tendon was exposed.  The capsulotomy was performed along the axis of the superior femoral neck.  A superior flap was raised off the greater trochanter and off the acetabular rim and reflected superiorly underneath our curved Cobra retractor.  An inferior flap was created by peeling the capsule off the intertrochanteric ridge inferomedially to the top of the lesser trochanter, and again reflected anteriorly with our curved Cobra retractor.  The capsular flaps were tagged. The femoral neck cut was then made.  This was based off our preoperative plan using  anatomic landmarks and confirmed with fluoroscopic guidance.  The femoral neck cut was made in two places.  The central segment of the neck was removed separately, and then the femoral head was removed.      The acetabulum was exposed.  A bent Hohmann retractor was placed anterior-inferior between the capsule and the labrum.  A Cobra retractor was placed posteriorly between the capsule and the labrum. The inferior capsule was incised. The acetabular labrum was excised.  Overhanging soft tissue was excised to provide excellent exposure of the acetabulum. The acetabulum was then sequentially reamed under direct visualization and fluoroscopic guidance with increased size reamers until our final reamer, which gave a 1 millimeter press-fit.  The acetabulum was then placed and impacted into position.  The acetabular component placement goal was approximately 42.5 degrees of abduction, with 15 degrees of anteversion.  We used our preoperative planning, bony landmarks and fluoroscopy to assess position.  The acetabular component was impacted into the pelvis and then secured to the pelvis with 2 screws.  The acetabular liner was placed, impacted into position; it locked.  The wound was then irrigated.     We then turned to the proximal femur.  The retractors were removed, and a new set of retractors was placed.  The OmniTract femoral elevator hook was placed around the vastus lateralis and proximal femur between the vastus tubercle and gluteus mega tendon. The hip was hyperextended by putting the bed in Trendelenberg and lowering the foot of the bed. The hook was assembled to its mounting frame and a gentle amount of tension was placed on the hook.    The double pronged retractor was placed on the posteromedial proximal femur and the double toothed retractor was placed in the interval between the capsule and gluteus minimus. Femoral releases of the capsule, piriformis and conjoint tendon were sequentially performed as  needed until adequate exposure was obtained.  The femur was then extended, adducted, and externally rotated.  The proximal femur was mobilized into the wound to create adequate exposure of the proximal femur.  After exposure was obtained, the femur was prepared with a box osteotome, curved awl, and then broached up to the final broach size. The hook was de-tensioned and the hook and retractors were then removed.    AP and lateral fluoroscopy of the femur were obtained to confirm good fit and alignment of the stem, as well as no fractures or cortical perforations were present. The trial neck and head were then placed, and the hip was reduced. Fluoroscopy was used to confirm length and offset were correct. Leg lengths were assessed clinically on the table.  The hip was assessed in extension, external rotation to check anterior stability.  It was stable.  Posterior stability was assessed with flexion, adduction, internal rotation. The hip was stable.  We flexed to 90 with internal rotation, stable.  Full flexion stable.  The hip was then dislocated.  The femur was exposed with the hook and retractors. The trial implants were removed.  The final hip implants were placed, impacted into position.  The stem had a good fit and fixation.  The femoral head was placed.  The hip was reduced.  With the final implants in position, we trialed the hip again.  Fluoroscopic images were again obtained, including AP and lateral views, which demonstrated excellent component position and no intraoperative fractures. The leg lengths, anterior stability, and posterior stability were all excellent.  The hip was then copiously irrigated with dilute Betadine and normal saline using a pulsatile lavage.  The hip capsular flaps were closed with a #5 Ethibond. The tensor fascia was closed with #1 PDS STRATAFIX.  The hip capsular flaps as well as fascia and subcutaneous tissue were injected with a mixture consisting of 30 mL of 0.5% ropivacaine,  30 mg of ketorolac, and epinephrine 0.2 mg solution diluted in 30 mL of normal saline.  Subcutaneous tissue closed with 2-0 Monocryl STRATAFIX.  The skin was closed with 3-0 Monocryl, STRATAFIX, and exofin glue and a sterile DERIAN dressing was placed.    The patient tolerated the procedure well and was taken to the recovery room in stable condition.  Following completion of the surgery all sponge instrument needle counts were correc    POSTOPERATIVE PLAN:   1. Weightbearing as tolerated on operative extremity  2. Anterior hip precautions x 4 weeks  3. DVT prophylaxis    ESTIMATED BLOOD LOSS:   150 ccs    INTRAOPERATIVE FLUIDS:   Per anesthesia record    SPONGE/INSTRUMENT/NEEDLE COUNTS:   Correct x 2    CONDITION ON DISCHARGE:   Stable    COMPLICATIONS:   none    Alvaro Comer MD, MSE

## 2024-06-24 NOTE — ANESTHESIA PREPROCEDURE EVALUATION
Anesthesia Evaluation     NPO Solid Status: > 8 hours  NPO Liquid Status: > 8 hours           Airway   Mallampati: I  TM distance: >3 FB  Neck ROM: full  No difficulty expected  Dental - normal exam     Pulmonary - normal exam   (+) ,sleep apnea on CPAP  Cardiovascular - normal exam    (+) hypertension well controlled      Neuro/Psych  (+) numbness  GI/Hepatic/Renal/Endo    (+) GERD well controlled, thyroid problem     Musculoskeletal     Abdominal  - normal exam    Bowel sounds: normal.   Substance History      OB/GYN          Other   arthritis,                 Anesthesia Plan    ASA 2     MAC and spinal     intravenous induction     Anesthetic plan, risks, benefits, and alternatives have been provided, discussed and informed consent has been obtained with: patient.  Pre-procedure education provided  Plan discussed with CRNA.    CODE STATUS:

## 2024-06-24 NOTE — DISCHARGE INSTRUCTIONS
Total Hip Replacement Discharge Instructions:    I. ACTIVITIES:  1. Exercises:  Complete exercise program as taught by the hospital physical therapist 2 times per day  Exercise program will be advanced by the physical therapist  During the day be up ambulating every 2 hours (while awake) for short distances  Complete the ankle pump exercises at least 10 times per hour (while awake)  Elevate legs when in bed and for at least 30 minutes during the day.Use cold packs 20-30 minutes approximately 5 times per day. This should be done before and after completing your exercises and at any time you are experiencing pain/ stiffness in your operative extremity.      2. Activities of Daily Living:  No tub baths, hot tubs, or swimming pools for 4 weeks  May shower and let water run over the incision on post-operative day #5 if no drainage. Do not scrub or rub the incision. Simply let the water run over the incision and pat dry.    II. Restrictions  Continue hip precautions as taught at the hospital  Your surgeon will discuss with you when you will be able to drive again.  Weight bearing is as tolerated  First week stay inside on even terrain. May go up and down stairs one stair at a time utilizing the hand rail once cleared by physical therapy to do so.  After one week, you may venture outside (if cleared to do so by physical therapist).    III. Precautions:  Everyone that comes near you should wash their hands  No elective dental, genital-urinary, or colon procedures or surgical procedures for 12 weeks after surgery unless absolutely necessary.   If dental work or surgical procedure is deemed absolutely necessary, you will need to contact your surgeon as you will need to take antibiotics 1 hour prior to any dental work (including teeth cleanings).  Please discuss with your surgeon prophylactic antibiotics as the length of time this intervention will be necessary for you varies with each patient’s health history and  situation.  Avoid sick people. If you must be around someone who is ill, they should wear a mask.  Avoid visits to the Emergency Room or Urgent Care unless you are having a life threatening event.   If ordered stockings are to be placed on in the morning and removed at night. Monitor the stockings to ensure that any swelling is not causing the stockings to become too tight. In this case, remove stockings immediately.    IV. INCISION CARE:  The dressing placed during surgery is waterproof and should remain in place for 5 to 7 days.  May shower with waterproof dressing on and allowed water to run over the dressing  Remove the dressing around day 5-7  There is Steri-Strips underneath your dressing which may stay on and will eventually fall off on their own  After dressing removal on day 7 May shower and allow water to run over the incision  No soaking or submersion of the incision in tubs pools hot tubs etc.  No creams or ointments to the incision  Do not touch or pick at the incision  Check incision every day and notify surgeon immediately if any of the following signs or symptoms are noted:  Increase in redness  Increase in swelling around the incision and of the entire extremity  Increase in pain  Drainage oozing from the incision  Pulling apart of the edges of the incision  Increase in overall body temperature (greater than 100.5 degrees)  Your surgeon will instruct you regarding suture or staple removal    V. Medications:   1. Anticoagulants: You will be discharged on an anticoagulant. This is a prophylactic medication that helps prevent blood clots during your post-operative period. The type and length of dosage varies based on your individual needs, procedure performed, and surgeon’s preference.  While taking the anticoagulant, you should avoid taking any additional aspirin, ibuprofen (Advil or Motrin), Aleve (Naprosyn) or other non-steroidal anti-inflammatory medications.   Notify surgeon immediately if any  heather bleeding is noted in the urine, stool, emesis, or from the nose or the incision. Blood in the stool will often appear as black rather than red. Blood in urine may appear as pink. Blood in emesis may appear as brown/black like coffee grounds.  You will need to apply pressure for longer periods of time to any cuts or abrasions to stop bleeding  Avoid alcohol while taking anticoagulants    2. Stool Softeners: You will be at greater risk of constipation after surgery due to being less mobile and the pain medications.   Take stool softeners as instructed by your surgeon while on pain medications. Over the counter Colace 100 mg 1-2 capsules twice daily.   If stools become too loose or too frequent, please decreases the dosage or stop the stool softener.  If constipation occurs despite use of stool softeners, you are to continue the stool softeners and add a laxative (Milk of Magnesia 1 ounce daily as needed)  Drink plenty of fluids, and eat fruits and vegetables during your recovery time    3. Pain Medications utilized after surgery are narcotics and the law requires that the following information be given to all patients that are prescribed narcotics:  CLASSIFICATION: Pain medications are called Opioids and are narcotics  LEGALITIES: It is illegal to share narcotics with others and to drive within 24 hours of taking narcotics  POTENTIAL SIDE EFFECTS: Potential side effects of opioids include: nausea, vomiting, itching, dizziness, drowsiness, dry mouth, constipation, and difficulty urinating.  POTENTIAL ADVERSE EFFECTS:   Opioid tolerance can develop with use of pain medications and this simply means that it requires more and more of the medication to control pain; however, this is seen more in patients that use opioids for longer periods of time.  Opioid dependence can develop with use of Opioids and this simply means that to stop the medication can cause withdrawal symptoms; however, this is seen with patients  that use Opioids for longer periods of time.  Opioid addiction can develop with use of Opioids and the incidence of this is very unlikely in patients who take the medications as ordered and stop the medications as instructed.  Opioid overdose can be dangerous, but is unlikely when the medication is taken as ordered and stopped when ordered. It is important not to mix opioids with alcohol or with and type of sedative such as Benadryl as this can lead to over sedation and respiratory difficulty.  DOSAGE:   Pain medications will need to be taken consistently for the first week to decrease pain and promote adequate pain relief and participation in physical therapy.  After the initial surgical pain begins to resolve, you may begin to decrease the pain medication. By the end of 6 weeks, you should be off of pain medications.  Refills will not be given by the office during evening hours, on weekends, or after 6 weeks post-op.  To seek refills on pain medications during the initial 6 week post-operative period, you must call the office 48 hours in advance to request the refill. The office will then notify you when to  the prescription. DO NOT wait until you are out of the medication to request a refill.    V. FOLLOW-UP VISITS:  You will need to follow up in the office with your surgeon in  2 weeks. Please call this number [phone]  to schedule this appointment.  If you have any concerns or suspected complications prior to your follow up visit, please call your surgeons office. Do not wait until your appointment time if you suspect complications. These will need to be addressed in the office promptly.

## 2024-06-24 NOTE — PLAN OF CARE
Goal Outcome Evaluation:  Plan of Care Reviewed With: patient        Progress: improving  Outcome Evaluation: Patient had surgery today, admitted to floor. VSS, no complaints of pain or concerns at this time.

## 2024-06-24 NOTE — THERAPY EVALUATION
Patient Name: Bernice Barrow  : 1955    MRN: [id number]                              Today's Date: 2024       Admit Date: 2024    Visit Dx:     ICD-10-CM ICD-9-CM   1. S/P hip replacement, left  Z96.642 V43.64   2. Primary osteoarthritis of left hip  M16.12 715.15     Patient Active Problem List   Diagnosis    Cervical myelopathy    Congenital spondylolisthesis    DDD (degenerative disc disease), lumbar    Scoliosis of lumbar spine    Spinal stenosis in cervical region    OA (osteoarthritis) of hip    Osteoarthritis of left hip     Past Medical History:   Diagnosis Date    Ankle sprain     Arthritis     Arthritis of back     Arthritis of neck     Bronchitis     h/o    Bursitis of hip     Cervical disc disorder     Difficulty walking     Fracture, foot     GERD (gastroesophageal reflux disease)     Hammer toe     Hip arthrosis     Hypertension     Knee sprain     Knee swelling     Low back pain     Lumbosacral disc disease     Peripheral neuropathy     Scoliosis [id number]    Sleep apnea     Stress fracture     Thyroid disease      Past Surgical History:   Procedure Laterality Date    COLONOSCOPY      CORRECTION HAMMER TOE      ENDOSCOPY      FOOT FRACTURE SURGERY Right     FOOT SURGERY Bilateral     heel spurs    HYSTERECTOMY      REPLACEMENT TOTAL KNEE Right     2014    REPLACEMENT TOTAL KNEE Left           General Information       Row Name 24 1544          Physical Therapy Time and Intention    Document Type evaluation  -OD     Mode of Treatment physical therapy  -OD       Row Name 24 1544          General Information    Patient Profile Reviewed yes  -OD     Prior Level of Function independent:;ADL's;all household mobility  spouse drives. owns RW and cane  -OD     Existing Precautions/Restrictions left;hip, anterior  -OD     Barriers to Rehab none identified  -OD       Row Name 24 1544          Living Environment    People in Home spouse  -OD       Row Name 24  1544          Home Main Entrance    Number of Stairs, Main Entrance two  -OD       Row Name 06/24/24 1544          Stairs Within Home, Primary    Number of Stairs, Within Home, Primary none  -OD       Row Name 06/24/24 1544          Cognition    Orientation Status (Cognition) oriented x 4  -OD       Row Name 06/24/24 1544          Safety Issues, Functional Mobility    Safety Issues Affecting Function (Mobility) impulsivity  -OD     Impairments Affecting Function (Mobility) balance;endurance/activity tolerance  -OD               User Key  (r) = Recorded By, (t) = Taken By, (c) = Cosigned By      Initials Name Provider Type    OD Funmilayo Pulliam PT Physical Therapist                   Mobility       Row Name 06/24/24 1545          Bed Mobility    Bed Mobility bed mobility (all) activities  -OD     All Activities, [place] (Bed Mobility) modified independence  -OD     Assistive Device (Bed Mobility) bed rails  -OD       Row Name 06/24/24 1545          Sit-Stand Transfer    Sit-Stand [place] (Transfers) standby assist  -OD     Assistive Device (Sit-Stand Transfers) walker, front-wheeled  -OD       Row Name 06/24/24 1545          Gait/Stairs (Locomotion)    [place] Level (Gait) contact guard  -OD     Assistive Device (Gait) walker, front-wheeled  -OD     Patient was able to Ambulate yes  -OD     Distance in Feet (Gait) 100  -OD     Deviations/Abnormal Patterns (Gait) other (see comments)  step-to initially, progressed to reciprocal. mild postural sway  -OD               User Key  (r) = Recorded By, (t) = Taken By, (c) = Cosigned By      Initials Name Provider Type    OD Funmilayo Pulliam PT Physical Therapist                   Obj/Interventions       Row Name 06/24/24 1546          Range of Motion Comprehensive    General Range of Motion bilateral lower extremity ROM WFL  -OD       Row Name 06/24/24 1546          Strength Comprehensive (MMT)    General Manual Muscle Testing (MMT) Assessment no strength  deficits identified  -OD       Row Name 06/24/24 1546          Balance    Balance Interventions sitting;standing;supported;minimal challenge  -OD       Row Name 06/24/24 1546          Sensory Assessment (Somatosensory)    Sensory Assessment (Somatosensory) sensation intact  -OD               User Key  (r) = Recorded By, (t) = Taken By, (c) = Cosigned By      Initials Name Provider Type    OD Funmilayo Pulliam, PT Physical Therapist                   Goals/Plan       Row Name 06/24/24 1549          Bed Mobility Goal 1 (PT)    Activity/Assistive Device (Bed Mobility Goal 1, PT) bed mobility activities, all  -OD     [place] Level/Cues Needed (Bed Mobility Goal 1, PT) independent  -OD     Time Frame (Bed Mobility Goal 1, PT) long term goal (LTG);2 weeks  -OD       Row Name 06/24/24 1549          Transfer Goal 1 (PT)    Activity/Assistive Device (Transfer Goal 1, PT) transfers, all;walker, rolling  -OD     [place] Level/Cues Needed (Transfer Goal 1, PT) modified independence  -OD     Time Frame (Transfer Goal 1, PT) long term goal (LTG);2 weeks  -OD       Row Name 06/24/24 1549          Gait Training Goal 1 (PT)    Activity/Assistive Device (Gait Training Goal 1, PT) gait (walking locomotion);assistive device use;walker, rolling  -OD     [place] Level (Gait Training Goal 1, PT) modified independence  -OD     Distance (Gait Training Goal 1, PT) 150'  -OD     Time Frame (Gait Training Goal 1, PT) long term goal (LTG);2 weeks  -OD       Row Name 06/24/24 1549          Stairs Goal 1 (PT)    Activity/Assistive Device (Stairs Goal 1, PT) stairs, all skills  -OD     [place] Level/Cues Needed (Stairs Goal 1, PT) independent  -OD     Number of Stairs (Stairs Goal 1, PT) 2  -OD     Time Frame (Stairs Goal 1, PT) long term goal (LTG);2 weeks  -OD       Row Name 06/24/24 1549          Patient Education Goal (PT)    Activity (Patient Education Goal, PT) IND with HEP  -OD     [place]/Cues/Accuracy (Memory Goal 2,  PT) demonstrates adequately  -OD     Time Frame (Patient Education Goal, PT) long term goal (LTG);2 weeks  -OD       Row Name 06/24/24 3492          Therapy Assessment/Plan (PT)    Planned Therapy Interventions (PT) balance training;bed mobility training;gait training;home exercise program;postural re-education;transfer training;neuromuscular re-education;ROM (range of motion);stair training;strengthening;stretching;patient/family education  -OD               User Key  (r) = Recorded By, (t) = Taken By, (c) = Cosigned By      Initials Name Provider Type    OD Funmilayo Pulliam, PT Physical Therapist                   Clinical Impression       Row Name 06/24/24 1544          Pain    Pretreatment Pain Rating 0/10 - no pain  -OD     Posttreatment Pain Rating 0/10 - no pain  -OD       Row Name 06/24/24 1548          Plan of Care Review    Plan of Care Reviewed With patient;spouse  -OD     Progress improving  -OD     Outcome Evaluation Bernice Barrow is a 68 y/o F with end-stage L hip arthritis admitted to [facility] on 6/24/24 and is now POD#0 s/p L anterior [place] with Dr. Comer. At baseline, pt lives with her spouse in [facility] with 2STE. Pt is IND with ADLs at baseline, using RW more than cane for mobility. Edu pt regarding post-op ther ex and precautions. Pt demo good quad and glut control. Pt was SBA for STS and CGA for ambulation x 100 feet with RW. Pt able to progress to reciprocal pattern with increased distance. Pt and spouse report she is current with OPPT, starting on Wednesday, and they already have AD at home. PT will f/u tomorrow morning for further exercise and stair training. Pt safe to d/c home with family assist when appropriate.  -OD       Row Name 06/24/24 1950          Therapy Assessment/Plan (PT)    Rehab Potential (PT) good, to achieve stated therapy goals  -OD     Criteria for Skilled Interventions Met (PT) yes;meets criteria  -OD     Therapy Frequency (PT) daily  -OD     Predicted Duration of Therapy  Intervention (PT) until d/c  -OD       Row Name 06/24/24 1546          Vital Signs    Intra Systolic BP Rehab 116  -OD     Intra Treatment Diastolic BP 80  -OD     O2 Delivery Pre Treatment room air  -OD     O2 Delivery Intra Treatment room air  -OD     O2 Delivery Post Treatment room air  -OD     Pre Patient Position Supine  -OD     Intra Patient Position Standing  -OD     Post Patient Position Sitting  -OD       Row Name 06/24/24 1546          Positioning and Restraints    Pre-Treatment Position in bed  -OD     Post Treatment Position chair  -OD     In Chair notified nsg;reclined;with family/caregiver;call light within reach;encouraged to call for assist;exit alarm on  -OD               User Key  (r) = Recorded By, (t) = Taken By, (c) = Cosigned By      Initials Name Provider Type    Funmilayo Funez PT Physical Therapist                   Outcome Measures       Row Name 06/24/24 1550          How much help from another person do you currently need...    Turning from your back to your side while in flat bed without using bedrails? 4  -OD     Moving from lying on back to sitting on the side of a flat bed without bedrails? 4  -OD     Moving to and from a bed to a chair (including a wheelchair)? 4  -OD     Standing up from a chair using your arms (e.g., wheelchair, bedside chair)? 4  -OD     Climbing 3-5 steps with a railing? 4  -OD     To walk in hospital room? 4  -OD     AM-PAC 6 Clicks Score (PT) 24  -OD     Highest Level of Mobility Goal 8 --> Walked 250 feet or more  -OD       Row Name 06/24/24 1550          Functional Assessment    Outcome Measure Options AM-PAC 6 Clicks Basic Mobility (PT)  -OD               User Key  (r) = Recorded By, (t) = Taken By, (c) = Cosigned By      Initials Name Provider Type    Funmilayo Funez PT Physical Therapist                                 Physical Therapy Education       Title: PT OT SLP Therapies (Done)       Topic: Physical Therapy (Done)       Point: Mobility  training (Done)       Learning Progress Summary             Patient Acceptance, E, VU by OD at 6/24/2024 1550                         Point: Home exercise program (Done)       Learning Progress Summary             Patient Acceptance, E, VU by OD at 6/24/2024 1550                         Point: Body mechanics (Done)       Learning Progress Summary             Patient Acceptance, E, VU by OD at 6/24/2024 1550                         Point: Precautions (Done)       Learning Progress Summary             Patient Acceptance, E, VU by OD at 6/24/2024 1550                                         User Key       Initials Effective Dates Name Provider Type Discipline    OD 05/11/23 -  Funmilayo Pulliam, PT Physical Therapist PT                  PT Recommendation and Plan  Planned Therapy Interventions (PT): balance training, bed mobility training, gait training, home exercise program, postural re-education, transfer training, neuromuscular re-education, ROM (range of motion), stair training, strengthening, stretching, patient/family education  Plan of Care Reviewed With: patient, spouse  Progress: improving  Outcome Evaluation: Bernice Barrow is a 70 y/o F with end-stage L hip arthritis admitted to [facility] on 6/24/24 and is now POD#0 s/p L anterior [place] with Dr. Comer. At baseline, pt lives with her spouse in [facility] with 2STE. Pt is IND with ADLs at baseline, using RW more than cane for mobility. Edu pt regarding post-op ther ex and precautions. Pt demo good quad and glut control. Pt was SBA for STS and CGA for ambulation x 100 feet with RW. Pt able to progress to reciprocal pattern with increased distance. Pt and spouse report she is current with OPPT, starting on Wednesday, and they already have AD at home. PT will f/u tomorrow morning for further exercise and stair training. Pt safe to d/c home with family assist when appropriate.     Time Calculation:         PT Charges       Row Name 06/24/24 9541             Time Calculation     Start Time 1510  -OD      Stop Time 1535  -OD      Time Calculation (min) 25 min  -OD      PT Received On 06/24/24  -OD      PT - Next Appointment 06/25/24  -OD      PT Goal Re-Cert Due Date 07/08/24  -OD         Time Calculation- PT    Total Timed Code Minutes- PT 0 minute(s)  -OD                User Key  (r) = Recorded By, (t) = Taken By, (c) = Cosigned By      Initials Name Provider Type    OD Funmilayo Pulliam, PT Physical Therapist                  Therapy Charges for Today       Code Description Service Date Service Provider Modifiers Qty    [id number] HC PT EVAL MOD COMPLEXITY 4 6/24/2024 Funmilayo Pulliam, PT GP 1            PT G-Codes  Outcome Measure Options: AM-PAC 6 Clicks Basic Mobility (PT)  AM-PAC 6 Clicks Score (PT): 24  PT Discharge Summary  Anticipated Discharge Disposition (PT): home with outpatient therapy services    Funmilayo Pulliam, PT  6/24/2024

## 2024-06-24 NOTE — ANESTHESIA PROCEDURE NOTES
Spinal Block    Pre-sedation assessment completed: 6/24/2024 10:20 AM    Patient reassessed immediately prior to procedure    Patient location during procedure: pre-op  Start Time: 6/24/2024 10:20 AM  Stop Time: 6/24/2024 10:30 AM  Indication:at surgeon's request and post-op pain management  Performed By  Anesthesiologist: Emmett Robles MD  Preanesthetic Checklist  Completed: patient identified, IV checked, site marked, risks and benefits discussed, surgical consent, monitors and equipment checked, pre-op evaluation and timeout performed  Spinal Block Prep:  Patient Position:sitting  Sterile Tech:cap, gloves, mask and sterile barriers  Prep:Chloraprep  Patient Monitoring:blood pressure monitoring, continuous pulse oximetry and EKG    Spinal Block Procedure  Approach:midline  Guidance:landmark technique and palpation technique  Location:L3-L4  Needle Type:Sprotte  Needle Gauge:27 G  Placement of Spinal needle event:cerebrospinal fluid aspirated  Paresthesia: no  Fluid Appearance:clear     Post Assessment  Patient Tolerance:patient tolerated the procedure well with no apparent complications  Complications no

## 2024-06-24 NOTE — CONSULTS
"[facility] Medicine Services  Consult Note    Patient Name: Bernice Barrow  : 1955  MRN: [id number]  Primary Care Physician:  Apollo Arellano APRN  Referring Physician: Alvaro Comer MD  Date of admission: 2024  Date and Time of Care: 24  at 4:08 PM EDT      Inpatient Hospitalist Consult  Consult performed by: Daphney Smith APRN  Consult ordered by: Alvaro Comer MD        Reason for Consult/ Chief Complaint: Medical management    Consult Requested By: Alvaro Comer MD    Subjective:     History of Present Illness:   Per the documentation by Pantera Sal MD, dated 2024,  \"Bernice Barrow is a 69 y.o. female with longstanding history of left hip pain.  The patient states that is ongoing for some time but lately has gotten quite severe.  She initially thought she had pulled a muscle in her groin but had x-rays which demonstrated severe left hip OA.  The patient is ambulatory with a cane and a significant limp.  She states that the hip pain has kept her from being able to do the things that she enjoys and has significantly affected her quality of life.  She was seen by me on 5/10/2024 and at that time was scheduled for left total hip arthroplasty.  The patient presents today for that procedure.     Review of Systems   Musculoskeletal:  Positive for arthralgias, gait problem, joint swelling and myalgias.   All other systems reviewed and are negative\"      Personal History:     Past Medical History:   Diagnosis Date    Ankle sprain     Arthritis     Arthritis of back     Arthritis of neck     Bronchitis     h/o    Bursitis of hip     Cervical disc disorder     Difficulty walking     Fracture, foot     GERD (gastroesophageal reflux disease)     Hammer toe     Hip arthrosis     Hypertension     Knee sprain     Knee swelling     Low back pain     Lumbosacral disc disease     Peripheral neuropathy     Scoliosis [id number]    Sleep apnea     Stress " fracture     Thyroid disease        Past Surgical History:   Procedure Laterality Date    COLONOSCOPY      CORRECTION HAMMER TOE      ENDOSCOPY      FOOT FRACTURE SURGERY Right     FOOT SURGERY Bilateral     heel spurs    HYSTERECTOMY      REPLACEMENT TOTAL KNEE Right     2014    REPLACEMENT TOTAL KNEE Left     2021       Family History: family history includes Alcohol abuse in her paternal uncle; Arthritis in her father and mother; Asthma in her father; COPD in her sister; Cancer in her father; Depression in her mother and sister; Drug abuse in her sister; Early death in her maternal grandfather; Hyperlipidemia in her mother; Hypertension in her mother. Otherwise pertinent FHx was reviewed and not pertinent to current issue.    Social History:  reports that she has quit smoking. Her smoking use included cigarettes. She started smoking about 3 years ago. She has a 0.9 pack-year smoking history. She has never used smokeless tobacco. She reports that she does not currently use alcohol. She reports that she does not use drugs.    Home Medications:   FLUoxetine, acetaminophen, aspirin, atenolol, dilTIAZem, hydrALAZINE, hydrOXYzine, hydroCHLOROthiazide, lansoprazole, levothyroxine, meloxicam, multivitamin with minerals, ondansetron, oxyCODONE-acetaminophen, sennosides-docusate, sulfamethoxazole-trimethoprim, and traMADol    Allergies:  Allergies   Allergen Reactions    Hydrocodone Nausea And Vomiting    Codeine GI Intolerance    Oxycodone Nausea And Vomiting    Nickel Rash         Objective:     Vital Signs  Temp:  [96.6 °F (35.9 °C)-98 °F (36.7 °C)] 96.6 °F (35.9 °C)  Heart Rate:  [] 51  Resp:  [11-22] 21  BP: ()/(32-85) 111/62   Body mass index is 30.87 kg/m².    Physical Exam  Physical Exam  PHYSICAL EXAM  Constitutional:  Well developed, well nourished, no acute distress, non-toxic appearance   Eyes:  PERRL, conjunctiva normal, EOMI   HENT:  Atraumatic, external ears normal, nose normal, oropharynx moist,  no pharyngeal exudates. Neck-normal range of motion, no tenderness, supple, trachea midline  Respiratory:  ctab, non-labored respirations without accessory muscle use  Cardiovascular:  Normal rate, normal rhythm, no murmurs, no gallops, no rubs   GI:  Soft, nondistended, normal bowel sounds, nontender, no organomegaly, no mass, no rebound, no guarding   :  No costovertebral angle tenderness   Musculoskeletal:  No edema, tenderness to left hip, no deformities  Integument:  Well hydrated, no rash   Lymphatic:  No lymphadenopathy noted   Neurologic:  Alert & oriented x 3, CN 2-12 normal, normal motor function, normal sensory function, no focal deficits noted   Psychiatric:  Speech and behavior appropriate      Scheduled Meds   [START ON 6/25/2024] aspirin, 81 mg, Oral, Q12H  atenolol, 25 mg, Oral, Daily  ceFAZolin, 2,000 mg, Intravenous, Q8H  hydrALAZINE, 50 mg, Oral, BID  hydroCHLOROthiazide, 25 mg, Oral, Daily  [START ON 6/25/2024] pantoprazole, 40 mg, Oral, Q AM       PRN Meds     HYDROcodone-acetaminophen    HYDROcodone-acetaminophen    Morphine **AND** naloxone    ondansetron ODT **OR** ondansetron   Infusions  lactated ringers, 1,000 mL, Last Rate: 25 mL/hr at 06/24/24 1105          Diagnostic Data          No radiology results for the last day      I reviewed the patient's new imaging results and agree with the interpretation.    Assessment/Plan:     Active and Resolved Problems  Active Hospital Problems    Diagnosis  POA    **OA (osteoarthritis) of hip [M16.9]  Unknown    Osteoarthritis of left hip [M16.12]  Yes      Resolved Hospital Problems   No resolved problems to display.       OA of left hip  - Postop day 0 as of 6/24/2024  - Pain control  - PT is following patient    GERD  -Protonix    ADY  - CPAP at bedside    VTE Prophylaxis:  No VTE prophylaxis order currently exists.         Code status is   There are no questions and answers to display.       Plan for disposition: Home, with OP PT in 1 day  days    Time: 30 minutes        Signature: Electronically signed by OLMAN Coleman, 06/24/24, 16:38 EDT.  [facility] Hospitalist Team

## 2024-06-24 NOTE — H&P
[facility]   HISTORY AND PHYSICAL    Patient Name:Bernice Barrow  : 1955  MRN: [id number]  Primary Care Physician: Apollo Arellano APRN  Date of admission: 2024    Subjective   Subjective     Chief Complaint: left hip pain/OA    History of Present Illness   Bernice Barrow is a 69 y.o. female with longstanding history of left hip pain.  The patient states that is ongoing for some time but lately has gotten quite severe.  She initially thought she had pulled a muscle in her groin but had x-rays which demonstrated severe left hip OA.  The patient is ambulatory with a cane and a significant limp.  She states that the hip pain has kept her from being able to do the things that she enjoys and has significantly affected her quality of life.  She was seen by me on 5/10/2024 and at that time was scheduled for left total hip arthroplasty.  The patient presents today for that procedure.    Review of Systems   Musculoskeletal:  Positive for arthralgias, gait problem, joint swelling and myalgias.   All other systems reviewed and are negative.        Personal History     Past Medical History:   Diagnosis Date    Ankle sprain     Arthritis     Arthritis of back     Arthritis of neck     Bronchitis     h/o    Bursitis of hip     Cervical disc disorder     Difficulty walking     Fracture, foot     GERD (gastroesophageal reflux disease)     Hammer toe     Hip arthrosis     Hypertension     Knee sprain     Knee swelling     Low back pain     Lumbosacral disc disease     Peripheral neuropathy     Scoliosis [id number]    Sleep apnea     Stress fracture     Thyroid disease        Past Surgical History:   Procedure Laterality Date    COLONOSCOPY      CORRECTION HAMMER TOE      ENDOSCOPY      FOOT FRACTURE SURGERY Right     FOOT SURGERY Bilateral     heel spurs    HYSTERECTOMY      REPLACEMENT TOTAL KNEE Right         REPLACEMENT TOTAL KNEE Left            Family History: Her family history  includes Alcohol abuse in her paternal uncle; Arthritis in her father and mother; Asthma in her father; COPD in her sister; Cancer in her father; Depression in her mother and sister; Drug abuse in her sister; Early death in her maternal grandfather; Hyperlipidemia in her mother; Hypertension in her mother.     Social History: She  reports that she has quit smoking. Her smoking use included cigarettes. She started smoking about 3 years ago. She has a 0.9 pack-year smoking history. She has never used smokeless tobacco. She reports that she does not currently use alcohol. She reports that she does not use drugs.    Home Medications:  FLUoxetine, acetaminophen, aspirin, atenolol, dilTIAZem, hydrALAZINE, hydrOXYzine, hydroCHLOROthiazide, lansoprazole, levothyroxine, meloxicam, multivitamin with minerals, ondansetron, oxyCODONE-acetaminophen, sennosides-docusate, sulfamethoxazole-trimethoprim, and traMADol    Allergies:  She is allergic to hydrocodone, codeine, oxycodone, and nickel.    Objective    Objective     Vitals:         Physical Exam   Left Hip Exam      Tenderness   The patient is experiencing tenderness in the anterior.     Range of Motion   Flexion:  90   External rotation:  10   Internal rotation: 0      Muscle Strength   Flexion: 4/5      Tests   KAREN: positive  Fadir:  Positive FADIR test     Other   Erythema: absent  Scars: absent  Sensation: normal  Pulse: present    Result Review    Result Review:  I have personally reviewed the results from the time of this admission to 6/24/2024 07:14 EDT and agree with these findings:  []  Laboratory list / accordion  []  Microbiology  []  Radiology  []  EKG/Telemetry   []  Cardiology/Vascular   []  Pathology  []  Old records  []  Other:  Most notable findings include:   Imaging: 3 views of the left hip and pelvis  Indication: left hip pain  Findings: X-rays demonstrate no acute osseous abnormality.  There is no signs of fracture dislocation or subluxation.  There is  joint space narrowing, subchondral sclerosis, cystic changes, and periarticular osteophytes.  Comparative studies: None      Assessment & Plan   Assessment / Plan     Brief Patient Summary:  Bernice Barrow is a 69 y.o. female with severe left hip osteoarthritis    Active Hospital Problems:  Active Hospital Problems    Diagnosis     **OA (osteoarthritis) of hip     Osteoarthritis of left hip      Plan:   Cc: f/u left hip pain, DJD    Patient presented to clinic today for preoperative history and physical visit in anticipation of upcoming scheduled left total hip arthroplasty.    I reviewed anatomy and a model of a total hip arthroplasty, as well as typical postoperative recovery of 6-12 months before maximal recovery, and possible need for rehabilitation stay after hospitalization. We also discussed risks, benefits, and alternatives of procedure with risks including but not limited to neurovascular damage, bleeding, infection, malalignment, chronic pain, leg length discrepancy, failure of implants, osteolysis, loosening of implants, loss of motion, weakness, stiffness, instability, DVT, pulmonary embolus, death, stroke, complex regional pain syndrome, myocardial infarction, and need for additional procedures. Patient understood all these and had all questions answered before consenting to the procedure. No guarantees were given in regards to results from the surgery.  Patient has been medically optimize by his primary care physician.    Implants: S&N DM  Anticoagulation: Asprin  Antibiotics: Cefazolin and Vanc  Post op ABX: Yes  TXA: Yes  Admission Type: Same Day    All remaining questions answered today.     VTE Prophylaxis:  No VTE prophylaxis order currently exists.        Alvaro Comer MD

## 2024-06-25 VITALS
DIASTOLIC BLOOD PRESSURE: 62 MMHG | HEART RATE: 75 BPM | HEIGHT: 62 IN | SYSTOLIC BLOOD PRESSURE: 99 MMHG | BODY MASS INDEX: 31.06 KG/M2 | OXYGEN SATURATION: 96 % | TEMPERATURE: 98 F | WEIGHT: 168.8 LBS | RESPIRATION RATE: 17 BRPM

## 2024-06-25 PROBLEM — M16.12 PRIMARY OSTEOARTHRITIS OF LEFT HIP: Status: ACTIVE | Noted: 2024-06-25

## 2024-06-25 LAB
ANION GAP SERPL CALCULATED.3IONS-SCNC: 9.8 MMOL/L (ref 5–15)
BASOPHILS # BLD AUTO: 0.1 10*3/MM3 (ref 0–0.2)
BASOPHILS NFR BLD AUTO: 0.6 % (ref 0–1.5)
BUN SERPL-MCNC: 19 MG/DL (ref 8–23)
BUN/CREAT SERPL: 18.4 (ref 7–25)
CALCIUM SPEC-SCNC: 8.6 MG/DL (ref 8.6–10.5)
CHLORIDE SERPL-SCNC: 104 MMOL/L (ref 98–107)
CO2 SERPL-SCNC: 24.2 MMOL/L (ref 22–29)
CREAT SERPL-MCNC: 1.03 MG/DL (ref 0.57–1)
DEPRECATED RDW RBC AUTO: 46.2 FL (ref 37–54)
EGFRCR SERPLBLD CKD-EPI 2021: 59.7 ML/MIN/1.73
EOSINOPHIL # BLD AUTO: 0.32 10*3/MM3 (ref 0–0.4)
EOSINOPHIL NFR BLD AUTO: 2 % (ref 0.3–6.2)
ERYTHROCYTE [DISTWIDTH] IN BLOOD BY AUTOMATED COUNT: 14.6 % (ref 12.3–15.4)
GLUCOSE SERPL-MCNC: 106 MG/DL (ref 65–99)
HCT VFR BLD AUTO: 33.7 % (ref 34–46.6)
HGB BLD-MCNC: 10.3 G/DL (ref 12–15.9)
IMM GRANULOCYTES # BLD AUTO: 0.1 10*3/MM3 (ref 0–0.05)
IMM GRANULOCYTES NFR BLD AUTO: 0.6 % (ref 0–0.5)
LYMPHOCYTES # BLD AUTO: 2.31 10*3/MM3 (ref 0.7–3.1)
LYMPHOCYTES NFR BLD AUTO: 14.6 % (ref 19.6–45.3)
MCH RBC QN AUTO: 26.3 PG (ref 26.6–33)
MCHC RBC AUTO-ENTMCNC: 30.6 G/DL (ref 31.5–35.7)
MCV RBC AUTO: 86.2 FL (ref 79–97)
MONOCYTES # BLD AUTO: 1.47 10*3/MM3 (ref 0.1–0.9)
MONOCYTES NFR BLD AUTO: 9.3 % (ref 5–12)
NEUTROPHILS NFR BLD AUTO: 11.49 10*3/MM3 (ref 1.7–7)
NEUTROPHILS NFR BLD AUTO: 72.9 % (ref 42.7–76)
NRBC BLD AUTO-RTO: 0 /100 WBC (ref 0–0.2)
PLATELET # BLD AUTO: 315 10*3/MM3 (ref 140–450)
PMV BLD AUTO: 10.5 FL (ref 6–12)
POTASSIUM SERPL-SCNC: 3.5 MMOL/L (ref 3.5–5.2)
RBC # BLD AUTO: 3.91 10*6/MM3 (ref 3.77–5.28)
SODIUM SERPL-SCNC: 138 MMOL/L (ref 136–145)
WBC NRBC COR # BLD AUTO: 15.79 10*3/MM3 (ref 3.4–10.8)

## 2024-06-25 PROCEDURE — 63710000001 HYDRALAZINE 25 MG TABLET: Performed by: INTERNAL MEDICINE

## 2024-06-25 PROCEDURE — 63710000001 ATENOLOL 25 MG TABLET: Performed by: INTERNAL MEDICINE

## 2024-06-25 PROCEDURE — A9270 NON-COVERED ITEM OR SERVICE: HCPCS | Performed by: INTERNAL MEDICINE

## 2024-06-25 PROCEDURE — 80048 BASIC METABOLIC PNL TOTAL CA: CPT | Performed by: INTERNAL MEDICINE

## 2024-06-25 PROCEDURE — 63710000001 HYDROCHLOROTHIAZIDE 25 MG TABLET: Performed by: INTERNAL MEDICINE

## 2024-06-25 PROCEDURE — 97116 GAIT TRAINING THERAPY: CPT

## 2024-06-25 PROCEDURE — 85025 COMPLETE CBC W/AUTO DIFF WBC: CPT | Performed by: INTERNAL MEDICINE

## 2024-06-25 PROCEDURE — 63710000001 PANTOPRAZOLE 40 MG TABLET DELAYED-RELEASE: Performed by: INTERNAL MEDICINE

## 2024-06-25 PROCEDURE — G0378 HOSPITAL OBSERVATION PER HR: HCPCS

## 2024-06-25 PROCEDURE — 25010000002 CEFAZOLIN PER 500 MG: Performed by: INTERNAL MEDICINE

## 2024-06-25 PROCEDURE — 63710000001 HYDROCODONE-ACETAMINOPHEN 10-325 MG TABLET: Performed by: INTERNAL MEDICINE

## 2024-06-25 PROCEDURE — 63710000001 ONDANSETRON ODT 4 MG TABLET DISPERSIBLE: Performed by: INTERNAL MEDICINE

## 2024-06-25 PROCEDURE — 97530 THERAPEUTIC ACTIVITIES: CPT

## 2024-06-25 PROCEDURE — 63710000001 ASPIRIN 81 MG TABLET DELAYED-RELEASE: Performed by: INTERNAL MEDICINE

## 2024-06-25 RX ADMIN — HYDRALAZINE HYDROCHLORIDE 50 MG: 25 TABLET ORAL at 08:29

## 2024-06-25 RX ADMIN — HYDROCHLOROTHIAZIDE 25 MG: 25 TABLET ORAL at 08:28

## 2024-06-25 RX ADMIN — ASPIRIN 81 MG: 81 TABLET, COATED ORAL at 08:28

## 2024-06-25 RX ADMIN — ONDANSETRON 4 MG: 4 TABLET, ORALLY DISINTEGRATING ORAL at 08:35

## 2024-06-25 RX ADMIN — HYDROCODONE BITARTRATE AND ACETAMINOPHEN 1 TABLET: 10; 325 TABLET ORAL at 00:36

## 2024-06-25 RX ADMIN — ATENOLOL 25 MG: 25 TABLET ORAL at 08:29

## 2024-06-25 RX ADMIN — SODIUM CHLORIDE 2000 MG: 900 INJECTION INTRAVENOUS at 03:30

## 2024-06-25 RX ADMIN — HYDROCODONE BITARTRATE AND ACETAMINOPHEN 1 TABLET: 10; 325 TABLET ORAL at 08:28

## 2024-06-25 RX ADMIN — PANTOPRAZOLE SODIUM 40 MG: 40 TABLET, DELAYED RELEASE ORAL at 05:24

## 2024-06-25 NOTE — DISCHARGE SUMMARY
Orthopedic Discharge Summary      Patient: Bernice Barrow      YOB: 1955    Medical Record Number: [id number]    Date of Admission: 6/24/2024  6:59 AM  Date of Discharge: 6/25/24        OA (osteoarthritis) of hip    Osteoarthritis of left hip    Primary osteoarthritis of left hip    No surgery found    Allergies:   Allergies   Allergen Reactions    Hydrocodone Nausea And Vomiting    Codeine GI Intolerance    Oxycodone Nausea And Vomiting    Nickel Rash       Current Medications:     Discharge Medications        New Medications        Instructions Start Date   aspirin 81 MG EC tablet   81 mg, Oral, Daily      ondansetron 4 MG tablet  Commonly known as: Zofran   4 mg, Oral, Every 8 Hours PRN      oxyCODONE-acetaminophen 5-325 MG per tablet  Commonly known as: PERCOCET   1 tablet, Oral, Every 4 Hours PRN      sennosides-docusate 8.6-50 MG per tablet  Commonly known as: PERICOLACE   1 tablet, Oral, Daily      sulfamethoxazole-trimethoprim 800-160 MG per tablet  Commonly known as: BACTRIM DS,SEPTRA DS   1 tablet, Oral, 2 Times Daily             Continue These Medications        Instructions Start Date   atenolol 25 MG tablet  Commonly known as: TENORMIN   25 mg, Oral, Daily      dilTIAZem 180 MG 24 hr capsule  Commonly known as: TIAZAC   180 mg, Oral, Daily      FLUoxetine 40 MG capsule  Commonly known as: PROzac   1 capsule, Oral, Daily      hydrALAZINE 50 MG tablet  Commonly known as: APRESOLINE   50 mg, Oral, 2 Times Daily      hydroCHLOROthiazide 25 MG tablet   25 mg, Oral, Daily      hydrOXYzine 25 MG tablet  Commonly known as: ATARAX   25 mg, Oral, Nightly PRN      lansoprazole 15 MG capsule  Commonly known as: PREVACID   30 mg, Oral, Daily      levothyroxine 175 MCG tablet  Commonly known as: SYNTHROID, LEVOTHROID   Oral, Every Morning      meloxicam 15 MG tablet  Commonly known as: MOBIC   15 mg, Oral, Daily      multivitamin with minerals tablet tablet   1 tablet, Oral, Daily      traMADol  50 MG tablet  Commonly known as: ULTRAM   50 mg, Oral, Every 12 Hours PRN      TYLENOL 500 MG tablet  Generic drug: acetaminophen   500 mg, Oral, Every 6 Hours PRN                   Past Medical History:   Diagnosis Date    Ankle sprain     Arthritis     Arthritis of back     Arthritis of neck     Bronchitis     h/o    Bursitis of hip     Cervical disc disorder     Difficulty walking     Fracture, foot     GERD (gastroesophageal reflux disease)     Hammer toe     Hip arthrosis     Hypertension     Knee sprain     Knee swelling     Low back pain     Lumbosacral disc disease     Peripheral neuropathy     Scoliosis [id number]    Sleep apnea     Stress fracture     Thyroid disease         Past Surgical History:   Procedure Laterality Date    COLONOSCOPY      CORRECTION HAMMER TOE      ENDOSCOPY      FOOT FRACTURE SURGERY Right     FOOT SURGERY Bilateral     heel spurs    HYSTERECTOMY      REPLACEMENT TOTAL KNEE Right     2014    REPLACEMENT TOTAL KNEE Left     2021        Social History     Occupational History    Not on file   Tobacco Use    Smoking status: Former     Average packs/day: 0.3 packs/day for 3.4 years (0.9 ttl pk-yrs)     Types: Cigarettes     Start date: 1/1/2021    Smokeless tobacco: Never    Tobacco comments:     Off and on   Vaping Use    Vaping status: Former    Quit date: 6/7/2024    Passive vaping exposure: Yes   Substance and Sexual Activity    Alcohol use: Not Currently     Comment: ocassional    Drug use: Never    Sexual activity: Defer      Social History     Social History Narrative    Not on file        Family History   Problem Relation Age of Onset    Arthritis Mother     Depression Mother     Hyperlipidemia Mother     Hypertension Mother     Arthritis Father     Asthma Father     Cancer Father         Esophageal Ca    Early death Maternal Grandfather         Heart attack at 28 yrs old    Alcohol abuse Paternal Uncle     COPD Sister     Depression Sister     Drug abuse Sister           "    Hospital Course:  69 y.o. female admitted to [facility] to services of Dr. Negrete on 6/24/2024 and underwent left total hip arthoplasty.   Post-operatively the patient transferred to the post-operative floor where the patient underwent mobilization therapy that included active as well as passive ROM exercises. Opioids were titrated to achieve appropriate pain management to allow for participation in mobilization exercises. Vital signs are now stable. The incision is intact without signs or symptoms of infection. Operative extremity neurovascular status remains intact.     Appropriate education re: incision care, activity levels, medications, and follow up visits was completed and all questions were answered. The patient is now deemed stable for discharge from hospital.        Discharge and Follow up Instructions:   Please see AVS for complete post op patient education   Patient will follow up with Dr. Sal in 2 weeks  Percocet 5/325 mg 1-2 tab Q4 hr PRN e scribed; ELSI/INSPECT reviewed  ASA 81 mg po daily  Zofran 4 mg 1 tab PO Q8 hr PRN for N/V  Senna-S, 2 tabs BID e scribed for post op/opioid induced constipation   Bactrim DS BID x 7 days     Date: ................6/25/2024    OLMAN Cardoso      \"The patient is physically incapable of utilizing regular toilet facilities. Use of a bedside commode is necessary as they are confined to one room   "

## 2024-06-25 NOTE — PLAN OF CARE
Pt presents with functional mobility impairments which indicate the need for skilled intervention. Tolerating session today without incident. Pt on room air and telemetry sitting up in b/s chair upon entry into room.  Pt required SBA for transfers with RW.  Ambulated 100' x 2 with RW with CGA.  Pt navigated 2 steps with B rails with CGA.  Pt is safe to d/c home per PT perspective with outpt PT.  Will continue to follow and progress as tolerated.

## 2024-06-25 NOTE — THERAPY TREATMENT NOTE
"Subjective: Pt agreeable to therapeutic plan of care.    Objective:     Bed mobility -  NT secondary to pt sitting up in b/s chair  Transfers -  SBA with RW  Ambulation -  100' x 2 with RW with SBA  Stair Training: Navigated 2 steps with B hand rails with CGA    Therapeutic Exercise -  Declined stating she would perform exercises later today     Vitals: WNL    Pain: 4 VAS   Location: L hip   Intervention for pain: Repositioned, Increased Activity, and Therapeutic Presence    Education: Provided education on the importance of mobility in the acute care setting, Verbal/Tactile Cues, Transfer Training, Gait Training, and Energy conservation strategies    Assessment: Bernice Barrow presents with functional mobility impairments which indicate the need for skilled intervention. Tolerating session today without incident. Pt on room air and telemetry sitting up in b/s chair upon entry into room.  Pt required SBA for transfers with RW.  Ambulated 100' x 2 with RW with CGA.  Pt navigated 2 steps with B rails with CGA.  Pt is safe to d/c home per PT perspective with outpt PT.  Will continue to follow and progress as tolerated.     Plan/Recommendations:   If medically appropriate, Low Intensity Therapy recommended post-acute care - This is recommended as therapy feels this patient would require 2-3 visits per week. OP or HH would be the best option depending on patient's home bound status. Consider, if the patient has other  \"skilled\" needs such as wounds, IV antibiotics, etc. Combined with \"low intensity\" could also equate to a SNF. If patient is medically complex, consider LTAC. Pt requires no DME at discharge.     Pt desires Outpatient therapy at discharge. Pt cooperative; agreeable to therapeutic recommendations and plan of care.     Post-Tx Position: Up in Chair, Alarms activated, and Call light and personal items within reach  PPE: gloves   "

## 2024-06-25 NOTE — PLAN OF CARE
Goal Outcome Evaluation:           Progress: improving  Outcome Evaluation: Pt VSS. pain treated with prn medication, Pt has been ambulating to the bathroom, 1 assist with gaitbelt and walker, Ice on left hip will continue to monitor,

## 2024-06-25 NOTE — DISCHARGE PLACEMENT REQUEST
"Bernice Barrow (69 y.o. Female)       Date of Birth   1955    Social Security Number       Address   [address] [place] [address] [address] [place] [place] [address]    Home Phone   [phone]    MRN   [id number]       Shinto   None    Marital Status                               Admission Date   6/24/24    Admission Type   Elective    Admitting Provider   Alvaro Comer MD    Attending Provider   Alvaro Comre MD    Department, Room/Bed   [facility], 4103/1       Discharge Date       Discharge Disposition   Home or Self Care    Discharge Destination                                 Attending Provider: Alvaro Comer MD    Allergies: Hydrocodone, Codeine, Oxycodone, Nickel    Isolation: None   Infection: None   Code Status: Not on file    Ht: 157.5 cm (62\")   Wt: 76.6 kg (168 lb 12.8 oz)    Admission Cmt: None   Principal Problem: OA (osteoarthritis) of hip [M16.9]                   Active Insurance as of 6/24/2024       Primary Coverage       Payor Plan Insurance Group Employer/Plan Group    MEDICARE MEDICARE A & B        Payor Plan Address Payor Plan Phone Number Payor Plan Fax Number Effective Dates    [address] [phone]  2/1/2022 - None Entered    [facility] [address]         Subscriber Name Subscriber Birth Date Member ID       BERNICE BARROW 1955 [id number]               Secondary Coverage       Payor Plan Insurance Group Employer/Plan Group    AARP [employer] SUP A[employer] HEALTH CARE OPTIONS        Payor Plan Address Payor Plan Phone Number Payor Plan Fax Number Effective Dates    [facility] [phone]  2/1/2022 - None Entered    [address]       [facility] [address]         Subscriber Name Subscriber Birth Date Member ID       BERNICE BARROW 1955 [id number]                     Emergency Contacts        (Rel.) Home Phone Work Phone Mobile Phone    KARLOS BARROW (Spouse) [phone] -- [phone]      "

## 2024-06-25 NOTE — CASE MANAGEMENT/SOCIAL WORK
Case Management Discharge Note      Final Note: home with family. Walker and BSC through [employer] will be delivered to home.         Selected Continued Care - Discharged on 6/25/2024 Admission date: 6/24/2024 - Discharge disposition: Home or Self Care          Transportation Services  Private: Car    Final Discharge Disposition Code: 01 - home or self-care

## 2024-06-25 NOTE — THERAPY DISCHARGE NOTE
OT completed brief screen. Pt reports being at/near baseline, with post-surgical pain and limitations being similar to that of pre-surgical OA pain. Pt has good social support of spouse, and demos good understanding of compensatory strategies for ADLs. Educated patient on OT role, goals, and progression of care. Pt reports no current OT needs. Will sign off at this time.

## 2024-06-26 ENCOUNTER — DOCUMENTATION (OUTPATIENT)
Dept: ORTHOPEDIC SURGERY | Facility: CLINIC | Age: 69
End: 2024-06-26
Payer: MEDICARE

## 2024-06-26 ENCOUNTER — TELEPHONE (OUTPATIENT)
Dept: ORTHOPEDIC SURGERY | Facility: CLINIC | Age: 69
End: 2024-06-26
Payer: MEDICARE

## 2024-06-26 NOTE — TELEPHONE ENCOUNTER
Provider: GUSTABO    Caller: LIZBETH GALLARDO    Relationship to Patient: SELF    Phone Number: [phone]    Reason for Call: PT CALLED STATING SHE WAS D/C YESTERDAY FROM HOSPITAL FROM SX AND THAT SHE WAS UNABLE TO ATTEND P/T TODAY. P/T ADVISED PT TO CALL US TO GET AN ORDER FOR HOME HEALTH TO HAVE P/T DONE AT HER HOME.

## 2024-06-26 NOTE — PROGRESS NOTES
Patient called the office this am complaining of severe surgical site pain.     She took a 7 minute walk this am and felt great. Later, pain began and now is severe. She cannot get out of the chair or bear weight due to pain symptoms. No fever or chest pain or SOA.     Her last dose of pain meds was yesterday evening.     I advised the patient to take 2 percocet tabs, apply ice and update us if pain not improved. Educated on importance of pain management and staying ahead of symptoms with anesthesia off board.     Ok to hold PT today.     Patient v/u.

## 2024-06-26 NOTE — PROGRESS NOTES
"Cardiology Clinic Note  Krzysztof Cardoso MD, PhD    Subjective:     Encounter Date:06/11/2024      Patient ID: Bernice Barrow is a 69 y.o. female.    Chief Complaint:  Chief Complaint   Patient presents with    Pre-op Exam       HPI:    I the pleasure to see this 69-year-old for preop clearance prior to left hip surgery, history of hypertension hyperlipidemia, denies any anginal chest pain, abnormal EKG with possible LVH but otherwise no ischemic findings.  No history of coronary disease chest pain or significant shortness of breath.  We discussed 2D echo for structural and functional evaluation and if normal without any symptoms would not recommend ischemic evaluation prior to noncardiovascular surgery.  We discussed coronary calcium scoring for restratification, perioperative beta-blockade afterload reduction blood pressure and lipid control and CV event reduction and prevention during our encounter.    Review of systems otherwise negative x 14 point review of systems except as mentioned above    Historical data copied forward from previous encounters in EMR including the history, exam, and assessment/plan has been reviewed and is unchanged unless noted otherwise.    Cardiac medicines reviewed with risk, benefits, and necessity of each discussed.    Risk and benefit of cardiac testing reviewed including death heart attack stroke pain bleeding infection need for vascular /cardiovascular surgery were discussed and the patient     Objective:         /78 (BP Location: Right arm, Patient Position: Sitting)   Pulse 88   Resp 18   Ht 167.6 cm (66\")   Wt 78.5 kg (173 lb)   BMI 27.92 kg/m²     Physical Exam  Regular rate and rhythm no rubs murmurs or gallops  No heave no lift  No clubbing cyanosis or edema  Intact grossly  Skin warm and dry normal pulses normal cap refill  Assessment:       Independently manage medical conditions  Essential hypertension  Hyperlipidemia  Perioperative " evaluation  Preventative health care      Diagnoses and all orders for this visit:    1. Abnormal EKG (Primary)  -     Adult Transthoracic Echo Complete W/ Color, Spectral and Contrast if Necessary Per Protocol; Future  -     CT Cardiac Calcium Score Without Dye; Future  -     Comprehensive Metabolic Panel; Future  -     Magnesium; Future  -     Urinalysis With Microscopic - Urine, Clean Catch; Future    2. Preventative health care  -     CT Cardiac Calcium Score Without Dye; Future  -     Comprehensive Metabolic Panel; Future  -     Magnesium; Future  -     Urinalysis With Microscopic - Urine, Clean Catch; Future    3. Screening for cholesterol level  -     Lipid Panel; Future    Other orders  -     atenolol (TENORMIN) 25 MG tablet; Take 1 tablet by mouth Daily.  Dispense: 30 tablet; Refill: 11  -     hydrALAZINE (APRESOLINE) 50 MG tablet; Take 1 tablet by mouth 2 (Two) Times a Day.  Dispense: 60 tablet; Refill: 5      If echo is normal but does not need ischemic evaluation  Okay to proceed with hip surgery  Perioperative beta-blocker and afterload reduction discussed  Calcium scoring for restratification  Correlate with lipid panel    The pleasure to be involved in this patient's cardiovascular care.  Please call with any questions or concerns  Krzysztof Cardoso MD, PhD    Most recent EKG as reviewed and interpreted by me:  Procedures     Most recent echo as reviewed and interpreted by me:      Most recent stress test as reviewed and interpreted by me:      Most recent cardiac catheterization as reviewed interpreted by me:  No results found for this or any previous visit.    The following portions of the patient's history were reviewed and updated as appropriate: allergies, current medications, past family history, past medical history, past social history, past surgical history, and problem list.      ROS:  14 point review of systems negative except as mentioned above    Current Outpatient Medications:      acetaminophen (TYLENOL) 500 MG tablet, Take 1 tablet by mouth Every 6 (Six) Hours As Needed for Mild Pain., Disp: , Rfl:     dilTIAZem (TIAZAC) 180 MG 24 hr capsule, Take 1 capsule by mouth Daily., Disp: 90 capsule, Rfl: 1    FLUoxetine (PROzac) 40 MG capsule, Take 1 capsule by mouth Daily., Disp: , Rfl:     hydroCHLOROthiazide 25 MG tablet, Take 1 tablet by mouth Daily., Disp: 90 tablet, Rfl: 1    hydrOXYzine (ATARAX) 25 MG tablet, TAKE 1 TABLET BY MOUTH AT NIGHT AS NEEDED FOR ANXIETY, Disp: 30 tablet, Rfl: 1    lansoprazole (PREVACID) 15 MG capsule, Take 2 capsules by mouth Daily., Disp: , Rfl:     meloxicam (MOBIC) 15 MG tablet, Take 1 tablet by mouth Daily., Disp: , Rfl:     aspirin 81 MG EC tablet, Take 1 tablet by mouth Daily., Disp: 60 tablet, Rfl: 0    atenolol (TENORMIN) 25 MG tablet, Take 1 tablet by mouth Daily., Disp: 30 tablet, Rfl: 11    hydrALAZINE (APRESOLINE) 50 MG tablet, Take 1 tablet by mouth 2 (Two) Times a Day., Disp: 60 tablet, Rfl: 5    levothyroxine (SYNTHROID, LEVOTHROID) 175 MCG tablet, TAKE 1 CAPSULE BY MOUTH EVERY MORNING, Disp: 90 tablet, Rfl: 1    multivitamin with minerals tablet tablet, Take 1 tablet by mouth Daily., Disp: , Rfl:     ondansetron (Zofran) 4 MG tablet, Take 1 tablet by mouth Every 8 (Eight) Hours As Needed for Nausea or Vomiting., Disp: 15 tablet, Rfl: 0    oxyCODONE-acetaminophen (PERCOCET) 5-325 MG per tablet, Take 1 tablet by mouth Every 4 (Four) Hours As Needed for Severe Pain., Disp: 30 tablet, Rfl: 0    sennosides-docusate (senna-docusate sodium) 8.6-50 MG per tablet, Take 1 tablet by mouth Daily., Disp: 30 tablet, Rfl: 0    sulfamethoxazole-trimethoprim (BACTRIM DS,SEPTRA DS) 800-160 MG per tablet, Take 1 tablet by mouth 2 (Two) Times a Day for 7 days., Disp: 14 tablet, Rfl: 0    traMADol (ULTRAM) 50 MG tablet, Take 1 tablet by mouth Every 12 (Twelve) Hours As Needed for Moderate Pain., Disp: 30 tablet, Rfl: 0  No current facility-administered medications for  this visit.    Problem List:  Patient Active Problem List   Diagnosis    Cervical myelopathy    Congenital spondylolisthesis    DDD (degenerative disc disease), lumbar    Scoliosis of lumbar spine    Spinal stenosis in cervical region    OA (osteoarthritis) of hip    Osteoarthritis of left hip    Primary osteoarthritis of left hip     Past Medical History:  Past Medical History:   Diagnosis Date    Ankle sprain     Arthritis     Arthritis of back     Arthritis of neck     Bronchitis     h/o    Bursitis of hip     Cervical disc disorder     Difficulty walking     Fracture, foot     GERD (gastroesophageal reflux disease)     Hammer toe     Hip arthrosis     Hypertension     Knee sprain     Knee swelling     Low back pain     Lumbosacral disc disease     Peripheral neuropathy     Scoliosis [id number]    Sleep apnea     Stress fracture     Thyroid disease      Past Surgical History:  Past Surgical History:   Procedure Laterality Date    COLONOSCOPY      CORRECTION HAMMER TOE      ENDOSCOPY      FOOT FRACTURE SURGERY Right     FOOT SURGERY Bilateral     heel spurs    HYSTERECTOMY      REPLACEMENT TOTAL KNEE Right     2014    REPLACEMENT TOTAL KNEE Left     2021    TOTAL HIP ARTHROPLASTY Left 6/24/2024    Procedure: TOTAL HIP ARTHROPLASTY ANTERIOR;  Surgeon: Alvaro Comer MD;  [facility] MAIN OR;  Service: Orthopedics;  Laterality: Left;     Social History:  Social History     Socioeconomic History    Marital status:    Tobacco Use    Smoking status: Former     Average packs/day: 0.3 packs/day for 3.4 years (0.9 ttl pk-yrs)     Types: Cigarettes     Start date: 1/1/2021    Smokeless tobacco: Never    Tobacco comments:     Off and on   Vaping Use    Vaping status: Former    Quit date: 6/7/2024    Passive vaping exposure: Yes   Substance and Sexual Activity    Alcohol use: Not Currently     Comment: ocassional    Drug use: Never    Sexual activity: Defer     Allergies:  Allergies   Allergen Reactions     Hydrocodone Nausea And Vomiting    Codeine GI Intolerance    Oxycodone Nausea And Vomiting    Nickel Rash     Immunizations:  Immunization History   Administered Date(s) Administered    31-influenza Vac Quardvalent Preservativ 10/30/2018    Arexvy (RSV, Adults 60+ yrs) 01/15/2024    COVID-19 (PFIZER) Purple Cap Monovalent 01/06/2021, 01/27/2021    COVID-19 F23 (PFIZER) 12YRS+ (COMIRNATY) 10/16/2023    Flu Vaccine Quad PF 6-35MO 10/27/2020    Fluad Quad 65+ 10/12/2023    Fluzone High-Dose 65+yrs 10/12/2022    H1N1 Inj 10/28/2009    Hepatitis A 08/10/2018, 02/22/2019    Influenza Seasonal Injectable 10/05/2013, 11/06/2014, 09/22/2015, 09/15/2016, 10/18/2017    Pneumococcal Conjugate 20-Valent (PCV20) 10/12/2022    Shingrix 10/12/2023, 12/22/2023    Tdap 02/23/2018, 11/09/2018            In-Office Procedure(s):  No orders to display        ASCVD RIsk Score::  The 10-year ASCVD risk score (Pete DK, et al., 2019) is: 9%    Imaging:    Results for orders placed during the hospital encounter of 04/30/24    XR Hip With or Without Pelvis 2 - 3 View Left    Narrative  XR HIP W OR WO PELVIS 2-3 VIEW LEFT    Date of Exam: 4/30/2024 1:44 PM EDT    Indication: continued groing and hip pain after fall last month    Comparison: None available.    Findings:  Severe degenerative change of the left hip. Bone-on-bone configuration of the acetabulum and femoral head with marginal sclerosis and marginal osteophyte formation and subchondral cystic change. The left renal head is mildly flattened at its superior  articular surface without heather subchondral collapse.    There are mild degenerative changes of the right hip with mild joint space narrowing and small acetabular marginal osteophyte.    No acute hip fracture or hip dislocation. No pelvic fracture. Advanced age of disc plate changes are suggested at L4-5 and L5-S1.    Impression  Impression:    1. No acute pelvic finding. No acute hip finding.  2. Severe degenerative changes of  the left hip.      Electronically Signed: Andreea Cooper MD  4/30/2024 2:00 PM EDT  Workstation ID: [id number]               Lab Review:   Lab on 05/23/2024   Component Date Value    TSH 05/23/2024 0.171 (L)     WBC 05/23/2024 7.77     RBC 05/23/2024 4.75     Hemoglobin 05/23/2024 12.5     Hematocrit 05/23/2024 39.8     MCV 05/23/2024 83.8     MCH 05/23/2024 26.3 (L)     MCHC 05/23/2024 31.4 (L)     RDW 05/23/2024 13.4     RDW-SD 05/23/2024 41.0     MPV 05/23/2024 10.6     Platelets 05/23/2024 354     Glucose 05/23/2024 88     BUN 05/23/2024 20     Creatinine 05/23/2024 0.92     Sodium 05/23/2024 140     Potassium 05/23/2024 3.9     Chloride 05/23/2024 101     CO2 05/23/2024 29.0     Calcium 05/23/2024 9.7     Total Protein 05/23/2024 7.0     Albumin 05/23/2024 4.5     ALT (SGPT) 05/23/2024 14     AST (SGOT) 05/23/2024 15     Alkaline Phosphatase 05/23/2024 81     Total Bilirubin 05/23/2024 0.6     Globulin 05/23/2024 2.5     A/G Ratio 05/23/2024 1.8     BUN/Creatinine Ratio 05/23/2024 21.7     Anion Gap 05/23/2024 10.0     eGFR 05/23/2024 68.4     Free T4 05/23/2024 2.49 (H)    Lab on 04/26/2024   Component Date Value    25 Hydroxy, Vitamin D 04/26/2024 42.0     TSH 04/26/2024 5.940 (H)     Vitamin B-12 04/26/2024 784     WBC 04/26/2024 10.82 (H)     RBC 04/26/2024 4.77     Hemoglobin 04/26/2024 12.6     Hematocrit 04/26/2024 39.6     MCV 04/26/2024 83.0     MCH 04/26/2024 26.4 (L)     MCHC 04/26/2024 31.8     RDW 04/26/2024 14.2     RDW-SD 04/26/2024 42.6     MPV 04/26/2024 11.3     Platelets 04/26/2024 400     Free T4 04/26/2024 1.83 (H)    Lab on 03/19/2024   Component Date Value    TSH 03/19/2024 8.380 (H)     Free T4 03/19/2024 1.07    Lab on 01/18/2024   Component Date Value    TSH 01/18/2024 7.080 (H)     Glucose 01/18/2024 103 (H)     BUN 01/18/2024 24 (H)     Creatinine 01/18/2024 1.00     Sodium 01/18/2024 140     Potassium 01/18/2024 3.2 (L)     Chloride 01/18/2024 101     CO2 01/18/2024 25.0     Calcium  01/18/2024 10.0     BUN/Creatinine Ratio 01/18/2024 24.0     Anion Gap 01/18/2024 14.0     eGFR 01/18/2024 62.3     Free T4 01/18/2024 0.97      Recent labs reviewed and interpreted for clinical significance and application            Level of Care:           Krzysztof Cardoso MD  06/25/24  .

## 2024-06-26 NOTE — TELEPHONE ENCOUNTER
I called and spoke with Bernice and told her that we are really recommending her get this pain under control and continue outpatient PT.  If nothing changes to call us back to let us know and we will see about home health getting set up.  I told to make sure she takes her pain medication every 4 I also told her to add an anti-inflammatory to help with some pain relief.  I told to get her PT rescheduled. Patient understood.

## 2024-06-28 RX ORDER — FLUCONAZOLE 150 MG/1
150 TABLET ORAL ONCE
COMMUNITY
End: 2024-07-01 | Stop reason: SDUPTHER

## 2024-07-01 ENCOUNTER — TELEPHONE (OUTPATIENT)
Dept: ORTHOPEDIC SURGERY | Facility: CLINIC | Age: 69
End: 2024-07-01
Payer: MEDICARE

## 2024-07-01 DIAGNOSIS — Z96.642 S/P HIP REPLACEMENT, LEFT: ICD-10-CM

## 2024-07-01 RX ORDER — FLUCONAZOLE 150 MG/1
150 TABLET ORAL ONCE
Qty: 1 TABLET | Refills: 0 | Status: SHIPPED | OUTPATIENT
Start: 2024-07-01 | End: 2024-07-01

## 2024-07-01 RX ORDER — OXYCODONE HYDROCHLORIDE AND ACETAMINOPHEN 5; 325 MG/1; MG/1
1 TABLET ORAL EVERY 4 HOURS PRN
Qty: 30 TABLET | Refills: 0 | Status: SHIPPED | OUTPATIENT
Start: 2024-07-01 | End: 2024-07-08 | Stop reason: SDUPTHER

## 2024-07-01 RX ORDER — HYDROCHLOROTHIAZIDE 25 MG/1
25 TABLET ORAL DAILY
Qty: 90 TABLET | Refills: 1 | Status: SHIPPED | OUTPATIENT
Start: 2024-07-01

## 2024-07-01 NOTE — TELEPHONE ENCOUNTER
Alma Sams, OLMAN  You2 hours ago (11:34 AM)       The Diflucan was sent Friday, but I resent it today.  Refilled pain meds  For the rash, I had recommended she continue OTC Cortisone cream and begin OTC Zyrtec daily

## 2024-07-01 NOTE — TELEPHONE ENCOUNTER
You  Alma Sams, APRN4 hours ago (8:37 AM)     AS  Last refill: Percocet 5/325 q 4 hours 6/24/24 #30  Patient is s/p left total hip replacement on 6/24/24.

## 2024-07-01 NOTE — TELEPHONE ENCOUNTER
Caller: Bernice Barrow    Relationship: Self    Best call back number: [phone]    Requested Prescriptions: OXYCODONE    Pharmacy where request should be sent: [id number] - [place], located [facility] [place] - [address] AT [address] [facility] [address] [facility] - [phone]  - [phone]  [phone]      Last office visit with prescribing clinician: 5/1/2024     Next office visit with prescribing clinician: Visit date not found     Additional details provided by patient: PT IS STATING THAT THE PHARMACY NEVER RECEIVED THE MEDICATION THAT WAS SPOKEN ABOUT ON FRIDAY FOR THE RASH AND YEAST    Does the patient have less than a 3 day supply:  [x] Yes  [] No    Would you like a call back once the refill request has been completed: [x] Yes [] No    If the office needs to give you a call back, can they leave a voicemail: [x] Yes [] No    Allen Jackson   07/01/24 08:32 EDT

## 2024-07-02 ENCOUNTER — TREATMENT (OUTPATIENT)
Dept: PHYSICAL THERAPY | Facility: CLINIC | Age: 69
End: 2024-07-02
Payer: MEDICARE

## 2024-07-02 DIAGNOSIS — M25.552 LEFT HIP PAIN: ICD-10-CM

## 2024-07-02 DIAGNOSIS — M25.652 STIFFNESS OF LEFT HIP JOINT: ICD-10-CM

## 2024-07-02 DIAGNOSIS — R29.898 WEAKNESS OF LEFT HIP: ICD-10-CM

## 2024-07-02 DIAGNOSIS — Z47.1 AFTERCARE FOLLOWING LEFT HIP JOINT REPLACEMENT SURGERY: Primary | ICD-10-CM

## 2024-07-02 DIAGNOSIS — Z96.642 AFTERCARE FOLLOWING LEFT HIP JOINT REPLACEMENT SURGERY: Primary | ICD-10-CM

## 2024-07-02 NOTE — PROGRESS NOTES
Physical Therapy Initial Evaluation and Plan of Care  [facility] [place]  [address], [address]  [facility] [place] [address]    Patient: Bernice Barrow   : 1955  Referring practitioner: Alvaro Comer MD  Date of Initial Visit: 2024  Today's Date: 2024  Patient seen for 1 sessions    Diagnosis/ICD-10 Code:      ICD-10-CM ICD-9-CM   1. Aftercare following left hip joint replacement surgery  Z47.1 V54.81    Z96.642 V43.64   2. Left hip pain  M25.552 719.45   3. Weakness of left hip  R29.898 729.89   4. Stiffness of left hip joint  M25.652 719.55     Past Medical History:   Diagnosis Date    Ankle sprain     Arthritis     Arthritis of back     Arthritis of neck     Bronchitis     h/o    Bursitis of hip     Cervical disc disorder     Difficulty walking     Fracture, foot     GERD (gastroesophageal reflux disease)     Hammer toe     Hip arthrosis     Hypertension     Knee sprain     Knee swelling     Low back pain     Lumbosacral disc disease     Peripheral neuropathy     Scoliosis [id number]    Sleep apnea     Stress fracture     Thyroid disease             Subjective Questionnaire: [place] Hip 14/48 (29%) Function      Subjective Evaluation    History of Present Illness  Date of surgery: 2024  Mechanism of injury: Bernice Barrow is a 69 year old female who reports to clinic today with current complaints of right hip pain, weakness, difficulty walking and decreased overall mobility.  She reports about a 3 month history of increasing right hip pain and ultimately opting for left THR with was performed on 2024.  She had one night stay at hospital and the then went home (no home health).  She is currently using walling and cane and is doing better since surgery.  She does note some issues sleeping due to hip pain.    Pain  Current pain ratin  At best pain ratin  At worst pain ratin  Location: anterior and lateral side of right hip  Quality: pulling and knife-like  Relieving  factors: rest, ice and medications  Aggravating factors: standing, ambulation and prolonged positioning  Progression: improved    Social Support  Lives in: one-story house (2 two stairs to enter - handrail R)  Lives with: spouse    Hand dominance: right    Treatments  Previous treatment: medication  Current treatment: physical therapy  Patient Goals  Patient goals for therapy: decreased pain, increased motion, increased strength, improved balance and independence with ADLs/IADLs  Patient goal: be able to do household chores           Objective          Observations     Additional Hip Observation Details  Scar appears to be healing well with no obvious signs of infection    Neurological Testing     Sensation     Hip   Left Hip   Intact: light touch    Active Range of Motion   Left Hip   Normal active range of motion    Right Hip   Normal active range of motion    Strength/Myotome Testing     Left Hip   Planes of Motion   Flexion: 4+  Extension: 4+  Abduction: 5  Adduction: 5    Right Hip   Planes of Motion   Flexion: 5  Extension: 5  Abduction: 5  Adduction: 5    Left Knee   Flexion: 5  Extension: 5    Right Knee   Flexion: 5  Extension: 5    Left Ankle/Foot   Dorsiflexion: 5  Plantar flexion: 5    Right Ankle/Foot   Dorsiflexion: 5  Plantar flexion: 5    Ambulation   Weight-Bearing Status   Weight-Bearing Status (Left): weight-bearing as tolerated   Weight-Bearing Status (Right): full weight-bearing    Assistive device used: two-wheeled walker    Ambulation: Level Surfaces   Ambulation with assistive device: independent    Observational Gait   Gait: antalgic and asymmetric   Decreased walking speed and stride length.     Quality of Movement During Gait   Trunk  Forward lean.     Hip    Hip (Left): Positive circumducted.         See Exercise, Manual, and Modality Logs for complete treatment    Issued, instructed in & performed home exercise program below:   Access Code: [email or web address]  URL:  [email or web address]  Date: 07/02/2024  Prepared by: Frank Ocampo    Exercises  - Supine Quad Set  - 2 x daily - 7 x weekly - 1 sets - 10 reps - 5 sec hold  - Supine Gluteal Sets  - 2 x daily - 7 x weekly - 1 sets - 10 reps - 5 sec hold  - Supine Heel Slide  - 2 x daily - 7 x weekly - 1 sets - 10 reps  - Supine Hip Adduction Isometric with Ball  - 2 x daily - 7 x weekly - 1 sets - 10 reps - 5 sec hold  - Hooklying Clamshell with Resistance  - 2 x daily - 7 x weekly - 1 sets - 10 reps - 5 sec hold  - Sit to Stand  - 2 x daily - 7 x weekly - 2 sets - 5 reps  - Standing Hip Abduction with Counter Support  - 2 x daily - 7 x weekly - 1 sets - 10 reps  - Standing Hip Flexion with Counter Support  - 2 x daily - 7 x weekly - 1 sets - 10 reps  - Standing Hip Extension with Counter Support  - 2 x daily - 7 x weekly - 1 sets - 10 reps  - Heel Raises with Counter Support  - 2 x daily - 7 x weekly - 1 sets - 10 reps    Assessment/Plan    History # of Personal Factors and/or Comorbidities: LOW (0)  Examination of Body System(s): # of elements: LOW (1-2)  Clinical Presentation: STABLE   Clinical Decision Making: LOW     Timed:         Manual Therapy:         mins  66011;     Therapeutic Exercise:    20     mins  64045;     Neuromuscular Sharif:        mins  01676;    Therapeutic Activity:     10     mins  06582;     Gait Training:           mins  73803;     Ultrasound:          mins  89006;    Ionto                                   mins   59796  Self Care                            mins   99361      Un-Timed:  Electrical Stimulation:         mins  79794 ( );  Canalith Repos         mins 36110  Traction          mins 93819  Dry Needle                 ______ mins DRYNDL  Low Eval     25     Mins  06388  Mod Eval          Mins  39268  High Eval                            Mins  72851  Re-Eval                               mins  06751        Timed Treatment:   30   mins   Total Treatment:     55   mins    PT SIGNATURE:  Alonzo Ocampo PT   DATE TREATMENT INITIATED: 7/2/2024    Initial Certification  Certification Period: 7/2/2024 through 9/30/2024  I certify that the therapy services are furnished while this patient is under my care.  The services outlined above are required by this patient, and will be reviewed every 90 days.     PHYSICIAN: Alvaro Comer MD      DATE:     Please sign and return via fax to [phone]. Thank you, [facility].

## 2024-07-08 ENCOUNTER — OFFICE VISIT (OUTPATIENT)
Dept: ORTHOPEDIC SURGERY | Facility: CLINIC | Age: 69
End: 2024-07-08
Payer: MEDICARE

## 2024-07-08 VITALS — OXYGEN SATURATION: 96 % | WEIGHT: 168 LBS | HEIGHT: 62 IN | BODY MASS INDEX: 30.91 KG/M2 | HEART RATE: 69 BPM

## 2024-07-08 DIAGNOSIS — Z96.642 S/P HIP REPLACEMENT, LEFT: ICD-10-CM

## 2024-07-08 DIAGNOSIS — Z96.642 STATUS POST TOTAL REPLACEMENT OF LEFT HIP: Primary | ICD-10-CM

## 2024-07-08 PROCEDURE — 99024 POSTOP FOLLOW-UP VISIT: CPT | Performed by: INTERNAL MEDICINE

## 2024-07-08 RX ORDER — OXYCODONE HYDROCHLORIDE AND ACETAMINOPHEN 5; 325 MG/1; MG/1
1 TABLET ORAL EVERY 4 HOURS PRN
Qty: 30 TABLET | Refills: 0 | Status: SHIPPED | OUTPATIENT
Start: 2024-07-08

## 2024-07-08 NOTE — PROGRESS NOTES
Subjective:     Patient ID: Bernice Barrow is a 69 y.o. female.    Chief Complaint:    History of Present Illness  Bernice Barrow returns to clinic today for evaluation of left hip status post total hip arthroplasty 2 weeks ago.  She is ambulatory today with a walker.  She is thrilled with the results so far.  She would like 1 more refill of pain medication that would likely like to transition to a short course of Lyrica or gabapentin.  She denies any fever chills or drainage from her surgical incision.     Social History     Occupational History    Not on file   Tobacco Use    Smoking status: Some Days     Average packs/day: 0.3 packs/day for 3.4 years (0.9 ttl pk-yrs)     Types: Cigarettes     Start date: 1/1/2021    Smokeless tobacco: Never    Tobacco comments:     Off and on   Vaping Use    Vaping status: Former    Quit date: 6/7/2024    Passive vaping exposure: Yes   Substance and Sexual Activity    Alcohol use: Not Currently     Comment: ocassional    Drug use: Never    Sexual activity: Not Currently     Partners: Male     Birth control/protection: Other, Post-menopausal, Hysterectomy      Past Medical History:   Diagnosis Date    Ankle sprain     Arthritis     Arthritis of back     Arthritis of neck     Bronchitis     h/o    Bursitis of hip     Cervical disc disorder     Difficulty walking     Fracture, foot     GERD (gastroesophageal reflux disease)     Hammer toe     Hip arthrosis     Hypertension     Knee sprain     Knee swelling     Low back pain     Lumbosacral disc disease     Peripheral neuropathy     Scoliosis [id number]    Sleep apnea     Stress fracture     Thyroid disease      Past Surgical History:   Procedure Laterality Date    COLONOSCOPY      CORRECTION HAMMER TOE      ENDOSCOPY      FOOT FRACTURE SURGERY Right     FOOT SURGERY Bilateral     heel spurs    HYSTERECTOMY      JOINT REPLACEMENT  [id number]    REPLACEMENT TOTAL KNEE Right     2014    REPLACEMENT TOTAL KNEE Left     2021     "TOTAL HIP ARTHROPLASTY Left 06/24/2024    Procedure: TOTAL HIP ARTHROPLASTY ANTERIOR;  Surgeon: Alvaro Comer MD;  Location: [facility] MAIN OR;  Service: Orthopedics;  Laterality: Left;       Family History   Problem Relation Age of Onset    Arthritis Mother     Depression Mother     Hyperlipidemia Mother     Hypertension Mother     Arthritis Father     Asthma Father     Cancer Father         Esophageal Ca    Early death Maternal Grandfather         Heart attack at 28 yrs old    Alcohol abuse Paternal Uncle     COPD Sister     Depression Sister     Drug abuse Sister                  Objective:  Vitals:    07/08/24 1248   Pulse: 69   SpO2: 96%   Weight: 76.2 kg (168 lb)   Height: 157.5 cm (62\")         07/08/24  1248   Weight: 76.2 kg (168 lb)     Body mass index is 30.73 kg/m².        Left Hip Exam     Tenderness   The patient is experiencing no tenderness.     Range of Motion   Flexion:  normal   External rotation:  normal   Internal rotation: normal     Muscle Strength   Flexion: 5/5     Tests   KAREN: negative  Fadir:  Negative FADIR test    Other   Erythema: absent  Scars: present  Sensation: normal  Pulse: present               Imaging: Standing AP pelvis was ordered and reviewed by myself in the office today  Indication: left hip replacement  Findings: X-rays demonstrate a left total hip arthroplasty with implants in expected position. Leg lengths and offset appear clinically equal based off radiographic markers.  No signs of fracture, dislocation, subluxation, subsidence, or migration.  Comparative studies: immediate post op radiographs      Assessment:        1. Status post total replacement of left hip           Plan:          Discussed treatment options at length with patient at today's visit. I discussed with the patient that they are doing well from my standpoint.  The patient is abulatory today with the use of a walker.  I discussed with them that in the coming weeks the most important thing is " physical therapy particularly strengthening, range of motion, and abulatory exercises.  I cautioned the patient about falls.  I discussed with them that it is normal to have stiffness achiness and pain particularly at night and in the morning.  This will continue to improve as time goes on and may continue to improve for 6 to 12 months postoperatively. I offered the patient a narcotic refill.  The patient's surgical incision is healing without signs of infection.  I would like the patient to avoid soaking or submersion of the surgical incision in water until all the scabbing has come off or for at least another 2 weeks.  They should clean the incision daily with soapy water in the shower.  I would like the patient to notify our office immediately if they note any increasing redness, pain, fevers, chills, or drainage of any kind from their surgical incision as this would be abnormal at this point in time.  I offered the patient a narcotic refill.  They should continue to take the anticoagulant for a total of 4 weeks.  I would like to see the patient back in 4 weeks for 6-week follow-up appointment.  The patient voiced understanding and agreement with the plan.  I refilled the patient's narcotic pain medication.  She would like 1 more refill and then would like a short course of Lyrica.  I am told her I am happy to prescribe this in the short-term.  Follow up: 4 weeks      Bernice Barrow was in agreement with plan and had all questions answered.     Medications:  No orders of the defined types were placed in this encounter.      Followup:  No follow-ups on file.    Diagnoses and all orders for this visit:    1. Status post total replacement of left hip (Primary)  -     XR Pelvis 1 or 2 View          Dictated utilizing Dragon dictation

## 2024-07-09 ENCOUNTER — TREATMENT (OUTPATIENT)
Dept: PHYSICAL THERAPY | Facility: CLINIC | Age: 69
End: 2024-07-09
Payer: MEDICARE

## 2024-07-09 DIAGNOSIS — Z47.1 AFTERCARE FOLLOWING LEFT HIP JOINT REPLACEMENT SURGERY: Primary | ICD-10-CM

## 2024-07-09 DIAGNOSIS — Z96.642 AFTERCARE FOLLOWING LEFT HIP JOINT REPLACEMENT SURGERY: Primary | ICD-10-CM

## 2024-07-09 DIAGNOSIS — M25.552 LEFT HIP PAIN: ICD-10-CM

## 2024-07-09 DIAGNOSIS — R29.898 WEAKNESS OF LEFT HIP: ICD-10-CM

## 2024-07-09 DIAGNOSIS — M25.652 STIFFNESS OF LEFT HIP JOINT: ICD-10-CM

## 2024-07-15 ENCOUNTER — TREATMENT (OUTPATIENT)
Dept: PHYSICAL THERAPY | Facility: CLINIC | Age: 69
End: 2024-07-15
Payer: MEDICARE

## 2024-07-15 DIAGNOSIS — Z96.642 AFTERCARE FOLLOWING LEFT HIP JOINT REPLACEMENT SURGERY: Primary | ICD-10-CM

## 2024-07-15 DIAGNOSIS — M25.552 LEFT HIP PAIN: ICD-10-CM

## 2024-07-15 DIAGNOSIS — R29.898 WEAKNESS OF LEFT HIP: ICD-10-CM

## 2024-07-15 DIAGNOSIS — M25.652 STIFFNESS OF LEFT HIP JOINT: ICD-10-CM

## 2024-07-15 DIAGNOSIS — Z47.1 AFTERCARE FOLLOWING LEFT HIP JOINT REPLACEMENT SURGERY: Primary | ICD-10-CM

## 2024-07-15 NOTE — PROGRESS NOTES
Physical Therapy Treatment Note  [facility] [place]  [address], [address]  [place] [address]    Patient: Bernice Barrow   : 1955  Referring practitioner: Alvaro Comer MD  Date of Initial Visit: Type: THERAPY  Noted: 2024  Today's Date: 2024  Patient seen for 2 sessions    Diagnosis/ICD-10 Codes:     ICD-10-CM ICD-9-CM   1. Aftercare following left hip joint replacement surgery  Z47.1 V54.81    Z96.642 V43.64   2. Left hip pain  M25.552 719.45   3. Weakness of left hip  R29.898 729.89   4. Stiffness of left hip joint  M25.652 719.55                Subjective     Bernice Barrow reports: She is doing better and has started to use cane at times.  Hip is feeling and doing better.      Objective   See Exercise, Manual, and Modality Logs for complete treatment.     Initiated Nu-step    Reviewed & performed home exercise program below:   Access Code: [email or web address]  URL: [email or web address]  Date: 2024  Prepared by: Frank Ocampo     Exercises  - Supine Quad Set  - 2 x daily - 7 x weekly - 1 sets - 10 reps - 5 sec hold  - Supine Gluteal Sets  - 2 x daily - 7 x weekly - 1 sets - 10 reps - 5 sec hold  - Supine Heel Slide  - 2 x daily - 7 x weekly - 1 sets - 10 reps  - Supine Hip Adduction Isometric with Ball  - 2 x daily - 7 x weekly - 1 sets - 10 reps - 5 sec hold  - Hooklying Clamshell with Resistance  - 2 x daily - 7 x weekly - 1 sets - 10 reps - 5 sec hold  - Sit to Stand  - 2 x daily - 7 x weekly - 2 sets - 5 reps  - Standing Hip Abduction with Counter Support  - 2 x daily - 7 x weekly - 1 sets - 10 reps  - Standing Hip Flexion with Counter Support  - 2 x daily - 7 x weekly - 1 sets - 10 reps  - Standing Hip Extension with Counter Support  - 2 x daily - 7 x weekly - 1 sets - 10 reps  - Heel Raises with Counter Support  - 2 x daily - 7 x weekly - 1 sets - 10 reps    Has stairs to enter home, so work on stair training.      Ice at end of  treatment      Assessment/Plan  Doing very well with decreased dependence on walker.  Better mobility and good tolerance to interventions in clinic.    Progress per Plan of Care and Progress strengthening /stabilization /functional activity. Assess response to today's interventions            Manual Therapy:         mins  50368;  Therapeutic Exercise:    20     mins  64771;     Neuromuscular Sharif:    10    mins  64708;    Therapeutic Activity:     10     mins  09068;     Gait Training:           mins  95212;     Ultrasound:          mins  65274;    Electrical Stimulation:         mins  55148 ( );  Dry Needling          mins self-pay    Timed Treatment:   40   mins   Total Treatment:     40   mins    Alonzo Ocampo PT  Physical Therapist

## 2024-07-15 NOTE — PROGRESS NOTES
Physical Therapy Treatment Note  [facility] [place]  [address], [address]  [place] [address]    Patient: Bernice Barrow   : 1955  Referring practitioner: Alvaro Comer MD  Date of Initial Visit: Type: THERAPY  Noted: 2024  Today's Date: 7/15/2024  Patient seen for 3 sessions    Diagnosis/ICD-10 Codes:      ICD-10-CM ICD-9-CM   1. Aftercare following left hip joint replacement surgery  Z47.1 V54.81    Z96.642 V43.64   2. Left hip pain  M25.552 719.45   3. Weakness of left hip  R29.898 729.89   4. Stiffness of left hip joint  M25.652 719.55              Subjective     Bernice Barrow reports: She is now primarily using standard cane.  Stair are going better.  Decreased hip pain.    Objective   See Exercise, Manual, and Modality Logs for complete treatment.     Added resistance to 3-way hip in standing.    Worked on gait mechanics    Continued work on stairs      Assessment/Plan  Doing very well with decreasing dependence on assistive devices.  Still has need for cane with ambulation and difficulty on stairs.    Progress per Plan of Care and Progress strengthening /stabilization /functional activity.  Work on transition off cane and improved ability on stairs.           Manual Therapy:                 mins  96077;  Therapeutic Exercise:    20     mins  37671;     Neuromuscular Sharif:    10    mins  63839;    Therapeutic Activity:      10     mins  76169;     Gait Training:                      mins  59060;     Ultrasound:                          mins  46068;    Electrical Stimulation:         mins  24399 ( );  Dry Needling                       mins self-pay     Timed Treatment:   40   mins   Total Treatment:     40   mins       Alonzo Ocampo PT  Physical Therapist

## 2024-07-17 ENCOUNTER — TREATMENT (OUTPATIENT)
Dept: PHYSICAL THERAPY | Facility: CLINIC | Age: 69
End: 2024-07-17
Payer: MEDICARE

## 2024-07-17 DIAGNOSIS — Z47.1 AFTERCARE FOLLOWING LEFT HIP JOINT REPLACEMENT SURGERY: Primary | ICD-10-CM

## 2024-07-17 DIAGNOSIS — R29.898 WEAKNESS OF LEFT HIP: ICD-10-CM

## 2024-07-17 DIAGNOSIS — M25.652 STIFFNESS OF LEFT HIP JOINT: ICD-10-CM

## 2024-07-17 DIAGNOSIS — M25.552 LEFT HIP PAIN: ICD-10-CM

## 2024-07-17 DIAGNOSIS — Z96.642 AFTERCARE FOLLOWING LEFT HIP JOINT REPLACEMENT SURGERY: Primary | ICD-10-CM

## 2024-07-17 NOTE — PROGRESS NOTES
Physical Therapy Treatment Note  [facility] [place]  [address], [address]  [place] [address]    Patient: Bernice Barrow   : 1955  Referring practitioner: Alvaro Comer MD  Date of Initial Visit: Type: THERAPY  Noted: 2024  Today's Date: 2024  Patient seen for 4 sessions    Diagnosis/ICD-10 Codes:      ICD-10-CM ICD-9-CM   1. Aftercare following left hip joint replacement surgery  Z47.1 V54.81    Z96.642 V43.64   2. Left hip pain  M25.552 719.45   3. Weakness of left hip  R29.898 729.89   4. Stiffness of left hip joint  M25.652 719.55              Subjective     Bernice Barrow reports: She is a little more sore today after being more active yesterday.  She notes her biggest issue is her right ankle which has caused her problems for years.    Objective   See Exercise, Manual, and Modality Logs for complete treatment.     Continued with resistance with 3-way hip in standing.    Worked on gait mechanics    Worked in stairs using proper mechanics       Assessment/Plan  Doing very well and has essentially weaned off assistive devices.  Some issues remaining with prolonged walking and on stairs.    Progress per Plan of Care and Progress strengthening /stabilization /functional activity.  Work on stairs with progression to reciprocal gait as tolerated.         Manual Therapy:                 mins  04984;  Therapeutic Exercise:    20     mins  43129;     Neuromuscular Sharif:    10    mins  77407;    Therapeutic Activity:      10     mins  24340;     Gait Training:                      mins  32463;     Ultrasound:                          mins  32820;    Electrical Stimulation:         mins  90102 ( );  Dry Needling                       mins self-pay     Timed Treatment:   40   mins   Total Treatment:     40   mins    Alonzo Ocampo PT  Physical Therapist

## 2024-07-18 PROBLEM — E03.9 HYPOTHYROIDISM: Status: ACTIVE | Noted: 2024-07-18

## 2024-07-18 PROBLEM — I10 HYPERTENSION: Status: ACTIVE | Noted: 2024-07-18

## 2024-07-22 ENCOUNTER — HOSPITAL ENCOUNTER (OUTPATIENT)
Dept: CT IMAGING | Facility: HOSPITAL | Age: 69
Discharge: HOME | End: 2024-07-22
Admitting: INTERNAL MEDICINE

## 2024-07-22 ENCOUNTER — TREATMENT (OUTPATIENT)
Dept: PHYSICAL THERAPY | Facility: CLINIC | Age: 69
End: 2024-07-22
Payer: MEDICARE

## 2024-07-22 DIAGNOSIS — M25.552 LEFT HIP PAIN: ICD-10-CM

## 2024-07-22 DIAGNOSIS — Z47.1 AFTERCARE FOLLOWING LEFT HIP JOINT REPLACEMENT SURGERY: Primary | ICD-10-CM

## 2024-07-22 DIAGNOSIS — R94.31 ABNORMAL EKG: ICD-10-CM

## 2024-07-22 DIAGNOSIS — Z96.642 AFTERCARE FOLLOWING LEFT HIP JOINT REPLACEMENT SURGERY: Primary | ICD-10-CM

## 2024-07-22 DIAGNOSIS — Z00.00 PREVENTATIVE HEALTH CARE: ICD-10-CM

## 2024-07-22 DIAGNOSIS — M25.652 STIFFNESS OF LEFT HIP JOINT: ICD-10-CM

## 2024-07-22 DIAGNOSIS — R29.898 WEAKNESS OF LEFT HIP: ICD-10-CM

## 2024-07-22 PROCEDURE — 75571 CT HRT W/O DYE W/CA TEST: CPT

## 2024-07-22 NOTE — PROGRESS NOTES
Physical Therapy Treatment Note  [facility] [place]  [address], [address]  [place] [address]    Patient: Bernice Barrow   : 1955  Referring practitioner: Alvaro Comer MD  Date of Initial Visit: Type: THERAPY  Noted: 2024  Today's Date: 2024  Patient seen for 5 sessions    Diagnosis/ICD-10 Codes:      ICD-10-CM ICD-9-CM   1. Aftercare following left hip joint replacement surgery  Z47.1 V54.81    Z96.642 V43.64   2. Left hip pain  M25.552 719.45   3. Weakness of left hip  R29.898 729.89   4. Stiffness of left hip joint  M25.652 719.55              Subjective     Bernice Barrow reports: Her hip is doing well.  Her right ankle hurts on and off.  She had some increased discomfort with kineso taping last visit and had to remove it the next day.    Objective   See Exercise, Manual, and Modality Logs for complete treatment.     Worked on gait mechanics and stair mechanics.      Assessment/Plan  Continues to progress with decreased antalgic pattern with gait.  R ankle now becoming more limiting that her hip.    Progress per Plan of Care and Progress strengthening /stabilization /functional activity.  Work on stairs with progression to reciprocal gait as tolerated.       Manual Therapy:                 mins  18046;  Therapeutic Exercise:    20     mins  54173;     Neuromuscular Sharif:    10    mins  90527;    Therapeutic Activity:      10     mins  99573;     Gait Training:                      mins  87283;     Ultrasound:                          mins  42903;    Electrical Stimulation:         mins  47239 ( );  Dry Needling                       mins self-pay     Timed Treatment:   40   mins   Total Treatment:     40   mins    Alonzo Ocampo PT  Physical Therapist

## 2024-07-24 ENCOUNTER — TREATMENT (OUTPATIENT)
Dept: PHYSICAL THERAPY | Facility: CLINIC | Age: 69
End: 2024-07-24
Payer: MEDICARE

## 2024-07-24 DIAGNOSIS — R29.898 WEAKNESS OF LEFT HIP: ICD-10-CM

## 2024-07-24 DIAGNOSIS — Z47.1 AFTERCARE FOLLOWING LEFT HIP JOINT REPLACEMENT SURGERY: Primary | ICD-10-CM

## 2024-07-24 DIAGNOSIS — M25.552 LEFT HIP PAIN: ICD-10-CM

## 2024-07-24 DIAGNOSIS — Z96.642 AFTERCARE FOLLOWING LEFT HIP JOINT REPLACEMENT SURGERY: Primary | ICD-10-CM

## 2024-07-24 DIAGNOSIS — M25.652 STIFFNESS OF LEFT HIP JOINT: ICD-10-CM

## 2024-07-24 NOTE — PROGRESS NOTES
Physical Therapy Treatment Note  [facility] [place]  [address], [address]  [place] [address]    Patient: Bernice Barrow   : 1955  Referring practitioner: Alvaro Comer MD  Date of Initial Visit: Type: THERAPY  Noted: 2024  Today's Date: 2024  Patient seen for 6 sessions    Diagnosis/ICD-10 Codes:      ICD-10-CM ICD-9-CM   1. Aftercare following left hip joint replacement surgery  Z47.1 V54.81    Z96.642 V43.64   2. Left hip pain  M25.552 719.45   3. Weakness of left hip  R29.898 729.89   4. Stiffness of left hip joint  M25.652 719.55              Subjective     Bernice Barrow reports: Her left hip if feeling better and actually her right ankle is doing better, even with extended walking yesterday.   She reports her primary concern is her balance at this point.    Objective   See Exercise, Manual, and Modality Logs for complete treatment.     Began work on balance in addition to previous interventions.    Continued work on stairs and on normalizing gait mechanics      Assessment/Plan  Continues to progress with decreased antalgic pattern with gait.  Did well with balance activities today  '  Progress per Plan of Care and Progress strengthening /stabilization /functional activity.  Work on stairs with progression to reciprocal gait as tolerated.         Manual Therapy:                 mins  18222;  Therapeutic Exercise:    20     mins  45690;     Neuromuscular Sharif:    10    mins  78810;    Therapeutic Activity:      10     mins  71595;     Gait Training:                      mins  37144;     Ultrasound:                          mins  62329;    Electrical Stimulation:         mins  89760 ( );  Dry Needling                       mins self-pay     Timed Treatment:   40   mins   Total Treatment:     40   mins      Alonzo Ocampo PT  Physical Therapist

## 2024-07-26 ENCOUNTER — TELEPHONE (OUTPATIENT)
Dept: CARDIOLOGY | Facility: CLINIC | Age: 69
End: 2024-07-26

## 2024-07-26 NOTE — TELEPHONE ENCOUNTER
Caller: Bernice Barrow    Relationship: Self    Best call back number: [phone]    Caller requesting test results: PATIENT    What test was performed: CT CARDIAC CALCIUM SCORE    When was the test performed: 7/22/24    Where was the test performed: [facility]    Additional notes: PT IS CALLING TO GET TEST RESULTS, PLEASE GIVE PT A CALL BACK

## 2024-07-29 ENCOUNTER — TREATMENT (OUTPATIENT)
Dept: PHYSICAL THERAPY | Facility: CLINIC | Age: 69
End: 2024-07-29
Payer: MEDICARE

## 2024-07-29 DIAGNOSIS — M25.652 STIFFNESS OF LEFT HIP JOINT: ICD-10-CM

## 2024-07-29 DIAGNOSIS — Z96.642 AFTERCARE FOLLOWING LEFT HIP JOINT REPLACEMENT SURGERY: Primary | ICD-10-CM

## 2024-07-29 DIAGNOSIS — M25.552 LEFT HIP PAIN: ICD-10-CM

## 2024-07-29 DIAGNOSIS — Z47.1 AFTERCARE FOLLOWING LEFT HIP JOINT REPLACEMENT SURGERY: Primary | ICD-10-CM

## 2024-07-29 DIAGNOSIS — R29.898 WEAKNESS OF LEFT HIP: ICD-10-CM

## 2024-07-29 NOTE — PROGRESS NOTES
Physical Therapy Treatment Note  [facility] [place]  [address], [address]  [place] [address]    Patient: Bernice Barrow   : 1955  Referring practitioner: Alvaro Comer MD  Date of Initial Visit: Type: THERAPY  Noted: 2024  Today's Date: 2024  Patient seen for 7 sessions    Diagnosis/ICD-10 Codes:      ICD-10-CM ICD-9-CM   1. Aftercare following left hip joint replacement surgery  Z47.1 V54.81    Z96.642 V43.64   2. Left hip pain  M25.552 719.45   3. Weakness of left hip  R29.898 729.89   4. Stiffness of left hip joint  M25.652 719.55              Subjective     Bernice Barrow reports: Doing much better overall.  Main issue is her right foot in regards to her mobility and balance.  No issues with her hip.    Objective   See Exercise, Manual, and Modality Logs for complete treatment.     Continued work on balance in addition to previous interventions.     Continued work on stairs and on normalizing gait mechanics        Assessment/Plan  Continues to progress with decreased antalgic pattern with gait.  Did well with balance activities today, improving with descending stairs as well.    Progress per Plan of Care and Progress strengthening /stabilization /functional activity.  Work on stairs with progression to reciprocal gait as tolerated.         Manual Therapy:                 mins  74380;  Therapeutic Exercise:    20     mins  80411;     Neuromuscular Sharif:    10    mins  43574;    Therapeutic Activity:      10     mins  69004;     Gait Training:                      mins  65726;     Ultrasound:                          mins  88960;    Electrical Stimulation:         mins  32711 ( );  Dry Needling                       mins self-pay     Timed Treatment:   40   mins   Total Treatment:     40   mins        Alonzo Ocampo PT  Physical Therapist

## 2024-07-31 ENCOUNTER — TELEPHONE (OUTPATIENT)
Dept: PHYSICAL THERAPY | Facility: OTHER | Age: 69
End: 2024-07-31
Payer: MEDICARE

## 2024-07-31 NOTE — TELEPHONE ENCOUNTER
Caller: Bernice Barrow    Relationship: Self      What was the call regarding: PATIENT CANCELLED APPT TODAY BECAUSE THEY CANT MAKE IT

## 2024-08-01 ENCOUNTER — TELEPHONE (OUTPATIENT)
Dept: PHYSICAL THERAPY | Facility: CLINIC | Age: 69
End: 2024-08-01
Payer: MEDICARE

## 2024-08-05 ENCOUNTER — OFFICE VISIT (OUTPATIENT)
Dept: ORTHOPEDIC SURGERY | Facility: CLINIC | Age: 69
End: 2024-08-05
Payer: MEDICARE

## 2024-08-05 VITALS — HEART RATE: 79 BPM | HEIGHT: 62 IN | WEIGHT: 168 LBS | OXYGEN SATURATION: 96 % | BODY MASS INDEX: 30.91 KG/M2

## 2024-08-05 DIAGNOSIS — Z96.642 STATUS POST TOTAL REPLACEMENT OF LEFT HIP: Primary | ICD-10-CM

## 2024-08-05 PROCEDURE — 99024 POSTOP FOLLOW-UP VISIT: CPT | Performed by: INTERNAL MEDICINE

## 2024-08-05 NOTE — PROGRESS NOTES
Subjective:     Patient ID: Bernice Barrow is a 69 y.o. female.    Chief Complaint:    History of Present Illness  Bernice Barrow returns to clinic today for evaluation of left hip status post total hip arthroplasty 6 weeks ago.  The patient is doing remarkably well.  She has been discharged in physical therapy.  She is ambulatory with no limp and no assistive device.  She denies any fever chills or drainage from her surgical incision and states it is like she never even had surgery.     Social History     Occupational History    Not on file   Tobacco Use    Smoking status: Some Days     Average packs/day: 0.3 packs/day for 3.4 years (0.9 ttl pk-yrs)     Types: Cigarettes     Start date: 1/1/2021    Smokeless tobacco: Never    Tobacco comments:     Off and on   Vaping Use    Vaping status: Former    Quit date: 6/7/2024    Passive vaping exposure: Yes   Substance and Sexual Activity    Alcohol use: Not Currently     Comment: ocassional    Drug use: Never    Sexual activity: Not Currently     Partners: Male     Birth control/protection: Other, Post-menopausal, Hysterectomy      Past Medical History:   Diagnosis Date    Ankle sprain     Arthritis     Arthritis of back     Arthritis of neck     Bronchitis     h/o    Bursitis of hip     Cervical disc disorder     Difficulty walking     Fracture, foot     GERD (gastroesophageal reflux disease)     Hammer toe     Hip arthrosis     Hypertension     Knee sprain     Knee swelling     Low back pain     Lumbosacral disc disease     Peripheral neuropathy     Scoliosis [id number]    Sleep apnea     Stress fracture     Thyroid disease      Past Surgical History:   Procedure Laterality Date    COLONOSCOPY      CORRECTION HAMMER TOE      ENDOSCOPY      FOOT FRACTURE SURGERY Right     FOOT SURGERY Bilateral     heel spurs    HYSTERECTOMY      JOINT REPLACEMENT  [id number]    REPLACEMENT TOTAL KNEE Right     2014    REPLACEMENT TOTAL KNEE Left     2021    TOTAL HIP  "ARTHROPLASTY Left 06/24/2024    Procedure: TOTAL HIP ARTHROPLASTY ANTERIOR;  Surgeon: Alvaro Comer MD;  Location: [facility] MAIN OR;  Service: Orthopedics;  Laterality: Left;       Family History   Problem Relation Age of Onset    Arthritis Mother     Depression Mother     Hyperlipidemia Mother     Hypertension Mother     Arthritis Father     Asthma Father     Cancer Father         Esophageal Ca    Early death Maternal Grandfather         Heart attack at 28 yrs old    Alcohol abuse Paternal Uncle     COPD Sister     Depression Sister     Drug abuse Sister                  Objective:  Vitals:    08/05/24 1108   Pulse: 79   SpO2: 96%   Weight: 76.2 kg (168 lb)   Height: 157.5 cm (62\")         08/05/24  1108   Weight: 76.2 kg (168 lb)     Body mass index is 30.73 kg/m².        Left Hip Exam     Tenderness   The patient is experiencing no tenderness.     Range of Motion   Flexion:  normal   External rotation:  normal   Internal rotation: normal     Muscle Strength   Flexion: 5/5     Tests   KAREN: negative  Fadir:  Negative FADIR test    Other   Erythema: absent  Scars: present  Sensation: normal  Pulse: present               Imaging: no new  Assessment:        1. Status post total replacement of left hip           Plan:          Discussed treatment options at length with patient at today's visit. I discussed with the patient that they are doing well from my standpoint.  I am happy with the progress that they have made.  They are ambulatory today with no assistive device and no limp or residual deficits.  I would like them to continue to work on range of motion and strengthening exercises.  I discussed with them that it is normal to have stiffness achiness and pain particularly at night and in the morning.  This will continue to improve as time goes on and may continue to improve for 6 to 12 months postoperatively.   The patient's surgical incision is healed without signs of infection.  I would like the patient to " notify our office immediately if they note any increasing redness, pain, fevers, chills, or drainage of any kind from their surgical incision as this would be abnormal at this point in time.  I discussed with the patient that at this point in time we do not need to see them back for 9 months for a 1 year follow-up appointment.  I did discuss however that I am happy to see the patient sooner if issues questions or concerns arise.  The patient voiced understanding and agreement with the plan.  Follow up: 6 weeks with standing AP pelvis x-ray      Bernice Barrow was in agreement with plan and had all questions answered.     Medications:  No orders of the defined types were placed in this encounter.      Followup:  No follow-ups on file.    Diagnoses and all orders for this visit:    1. Status post total replacement of left hip (Primary)          Dictated utilizing Dragon dictation

## 2024-08-14 ENCOUNTER — OFFICE VISIT (OUTPATIENT)
Dept: PODIATRY | Facility: CLINIC | Age: 69
End: 2024-08-14
Payer: MEDICARE

## 2024-08-14 VITALS — HEIGHT: 62 IN | WEIGHT: 168 LBS | RESPIRATION RATE: 20 BRPM | BODY MASS INDEX: 30.91 KG/M2

## 2024-08-14 DIAGNOSIS — M21.961 FOOT DEFORMITY, RIGHT: ICD-10-CM

## 2024-08-14 DIAGNOSIS — M19.071 ARTHRITIS OF MIDTARSAL JOINT OF RIGHT FOOT: ICD-10-CM

## 2024-08-14 DIAGNOSIS — M79.671 RIGHT FOOT PAIN: Primary | ICD-10-CM

## 2024-08-14 DIAGNOSIS — R60.0 EDEMA OF RIGHT LOWER EXTREMITY: ICD-10-CM

## 2024-08-14 RX ORDER — TRIAMCINOLONE ACETONIDE 40 MG/ML
20 INJECTION, SUSPENSION INTRA-ARTICULAR; INTRAMUSCULAR ONCE
Status: SHIPPED | OUTPATIENT
Start: 2024-08-14

## 2024-08-15 NOTE — PROGRESS NOTES
08/14/2024  Foot and Ankle Surgery - Established Patient/Follow-up  Provider: Dr. Sanjiv Yi DPM  Location: [facility]    Subjective:  Bernice Barrow is a 69 y.o. female.     Chief Complaint   Patient presents with    Right Foot - Follow-up, Edema, Pain    Follow-up     EUGENIO THURMAN 5/23/2024       History of Present Illness  The patient presents for evaluation of right foot pain. She is accompanied by an adult male.    She has a history of a broken bone in her right foot, which she believes is unrelated to her current discomfort. Despite undergoing a left hip replacement procedure performed by Dr. Sal, she continues to experience pain and swelling in her right foot. She experiences mild discomfort in her left foot, but it is not as severe as the pain in her right foot.    She has tried various shoe inserts for relief and has received a steroid injection in her ankle, but not in the middle of her foot. She prefers to receive an injection today rather than undergoing a CT scan. She is curious about the potential impact of nerve surgery on her ability to drive.    She has been wearing supportive tennis shoes and regularly performs exercises.      Allergies   Allergen Reactions    Hydrocodone Nausea And Vomiting    Codeine GI Intolerance    Oxycodone Nausea And Vomiting    Nickel Rash       Current Outpatient Medications on File Prior to Visit   Medication Sig Dispense Refill    acetaminophen (TYLENOL) 500 MG tablet Take 1 tablet by mouth Every 6 (Six) Hours As Needed for Mild Pain.      aspirin 81 MG EC tablet Take 1 tablet by mouth Daily. 60 tablet 0    atenolol (TENORMIN) 25 MG tablet Take 1 tablet by mouth Daily. 30 tablet 11    dilTIAZem (TIAZAC) 180 MG 24 hr capsule Take 1 capsule by mouth Daily. 90 capsule 1    FLUoxetine (PROzac) 40 MG capsule Take 1 capsule by mouth Daily.      hydrALAZINE (APRESOLINE) 50 MG tablet Take 1 tablet by mouth 2 (Two) Times a Day. 60 tablet 5     "hydroCHLOROthiazide 25 MG tablet Take 1 tablet by mouth Daily. 90 tablet 1    hydrOXYzine (ATARAX) 25 MG tablet TAKE 1 TABLET BY MOUTH AT NIGHT AS NEEDED FOR ANXIETY 30 tablet 1    lansoprazole (PREVACID) 15 MG capsule Take 2 capsules by mouth Daily.      levothyroxine (SYNTHROID, LEVOTHROID) 175 MCG tablet TAKE 1 CAPSULE BY MOUTH EVERY MORNING 90 tablet 1    meloxicam (MOBIC) 15 MG tablet Take 1 tablet by mouth Daily.      multivitamin with minerals tablet tablet Take 1 tablet by mouth Daily.      sennosides-docusate (senna-docusate sodium) 8.6-50 MG per tablet Take 1 tablet by mouth Daily. 30 tablet 0    traMADol (ULTRAM) 50 MG tablet Take 1 tablet by mouth Every 12 (Twelve) Hours As Needed for Moderate Pain. 30 tablet 0     No current facility-administered medications on file prior to visit.       Objective   Resp 20   Ht 157.5 cm (62\")   Wt 76.2 kg (168 lb)   BMI 30.73 kg/m²     Foot/Ankle Exam  Physical Exam  GENERAL  Orientation:  AAOx3  Affect:  appropriate     VASCULAR      Right Foot Vascularity   Normal vascular exam    Dorsalis pedis:  2+  Posterior tibial:  2+  Skin temperature:  warm  Edema grading:  None  CFT:  < 3 seconds  Pedal hair growth:  Present  Varicosities:  none      Left Foot Vascularity   Normal vascular exam    Dorsalis pedis:  2+  Posterior tibial:  2+  Skin temperature:  warm  Edema grading:  None  CFT:  < 3 seconds  Pedal hair growth:  Present  Varicosities:  none     NEUROLOGIC      Right Foot Neurologic   Light touch sensation: normal  Hot/Cold sensation: normal  Achilles reflex:  2+      Left Foot Neurologic   Light touch sensation: normal  Hot/Cold sensation:  normal  Achilles reflex:  2+     MUSCULOSKELETAL      Right Foot Musculoskeletal   Arch:  Normal      Left Foot Musculoskeletal   Arch:  Normal     MUSCLE STRENGTH      Right Foot Muscle Strength   Normal strength    Foot dorsiflexion:  5  Foot plantar flexion:  5  Foot inversion:  5  Foot eversion:  5      Left Foot Muscle " Strength   Normal strength    Foot dorsiflexion:  5  Foot plantar flexion:  5  Foot inversion:  5  Foot eversion:  5     DERMATOLOGIC       Right Foot Dermatologic   Skin  Right foot skin is intact.   Nails comment:  Nails 1-5      Left Foot Dermatologic   Skin  Left foot skin is intact.   Nails comment:  Nails 1-5     TESTS      Right Foot Tests   Anterior drawer: negative  Varus tilt: negative      Left Foot Tests   Anterior drawer: negative  Varus tilt: negative     Right foot additional comments: Significant pes planus foot structure and prominent rigidity involving the right foot. Bony prominence noted to various aspects of the dorsal aspect of the forefoot and midfoot. Diffuse discomfort with palpation. Swelling involving the right lower extremity. Moderate equinus contracture with knee extended and flexed.     03/28/2024  Continued discomfort involving the foot. No new issues or concerns.    8/14/24: Continued pain and discomfort involving the dorsal aspect of the right midfoot.  No progressive deformity or instability.  Mild swelling noted today.  Continued equinus contracture with knee extended and flexed.      Results      Assessment & Plan   Diagnoses and all orders for this visit:    1. Right foot pain (Primary)    2. Foot deformity, right    3. 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 Edema of right lower extremity      Assessment & Plan    The patient continues to experience pain and swelling in the right foot, primarily with weightbearing activities. Previous treatments, including supportive shoes and inserts, have not provided sufficient relief. A CT scan is recommended for a detailed evaluation of the foot structure to better understand the extent of arthritis and other potential issues. A steroid injection will be administered today to help alleviate pain and discomfort. She is advised to  wear supportive shoes 90% of the day and continue with stretching exercises, toe bending, and massages. If the steroid injection results in an 80% improvement and she regains functionality, the effectiveness and duration of this treatment will be monitored. Surgery, including deep peroneal nerve transection, remains an option if conservative treatments fail, but it is noted to be a large undertaking with potential risks.  We did briefly discuss reconstruction efforts but patient does not want to consider extensive surgery or recovery.  Will reevaluate at follow-up.Reviewed proper basic stretching and manual therapy exercises along with appropriate shoes and activity.  Discussed proper use and/or avoidance of OTC anti-inflammatories.  Patient is to call with any additional issues or concerns.  Greater than 20 minutes was spent before, during, and after evaluation for patient care.    Follow-up  A follow-up appointment will be scheduled in 6 weeks.     Midtarsal Steroid Injection: Right foot    Consent and time out was performed before proceeding with the procedure.   The skin about the naviculocuneiform joints of the right foot was cleansed with alcohol and anesthetized with approximately 1mL of 1% lidocaine plain.  A Betadine prep was then performed to the area in question.  Ultrasound guidance was used to evaluate and isolate the joint space.  Using an aseptic technique, a 1.5 mL solution containing 0.5 mL of 0.5% Marcaine plain, 0.5mL of 1% lidocaine plain and 0.5 mL of Kenalog was injected into the joint. After the injection, compression was applied followed by a sterile bandage.  The patient noted relief from pain and tolerated the injection well without complication.           Patient or patient representative verbalized consent for the use of Ambient Listening during the visit with  KRIS Yi DPM for chart documentation. 8/15/2024  07:25 EDT    KRIS Yi DPM

## 2024-08-28 DIAGNOSIS — E03.9 HYPOTHYROIDISM, UNSPECIFIED TYPE: ICD-10-CM

## 2024-08-28 RX ORDER — LEVOTHYROXINE SODIUM 175 UG/1
TABLET ORAL EVERY MORNING
Qty: 90 TABLET | Refills: 1 | Status: SHIPPED | OUTPATIENT
Start: 2024-08-28

## 2024-09-18 ENCOUNTER — HOSPITAL ENCOUNTER (OUTPATIENT)
Dept: CT IMAGING | Facility: HOSPITAL | Age: 69
Discharge: HOME | End: 2024-09-18
Admitting: PODIATRIST
Payer: MEDICARE

## 2024-09-18 DIAGNOSIS — M19.071 ARTHRITIS OF MIDTARSAL JOINT OF RIGHT FOOT: ICD-10-CM

## 2024-09-18 PROCEDURE — 73700 CT LOWER EXTREMITY W/O DYE: CPT

## 2024-09-19 ENCOUNTER — OFFICE VISIT (OUTPATIENT)
Dept: ORTHOPEDIC SURGERY | Facility: CLINIC | Age: 69
End: 2024-09-19
Payer: MEDICARE

## 2024-09-19 VITALS — WEIGHT: 168 LBS | BODY MASS INDEX: 30.91 KG/M2 | HEART RATE: 64 BPM | HEIGHT: 62 IN | OXYGEN SATURATION: 97 %

## 2024-09-19 DIAGNOSIS — Z96.642 STATUS POST TOTAL REPLACEMENT OF LEFT HIP: Primary | ICD-10-CM

## 2024-09-19 PROCEDURE — 99024 POSTOP FOLLOW-UP VISIT: CPT | Performed by: INTERNAL MEDICINE

## 2024-09-22 DIAGNOSIS — G47.00 INSOMNIA, UNSPECIFIED TYPE: ICD-10-CM

## 2024-09-23 RX ORDER — HYDROXYZINE HYDROCHLORIDE 25 MG/1
25 TABLET, FILM COATED ORAL NIGHTLY PRN
Qty: 30 TABLET | Refills: 1 | Status: SHIPPED | OUTPATIENT
Start: 2024-09-23

## 2024-09-26 ENCOUNTER — OFFICE VISIT (OUTPATIENT)
Dept: PODIATRY | Facility: CLINIC | Age: 69
End: 2024-09-26
Payer: MEDICARE

## 2024-09-26 VITALS
OXYGEN SATURATION: 95 % | RESPIRATION RATE: 20 BRPM | HEIGHT: 62 IN | HEART RATE: 59 BPM | BODY MASS INDEX: 30.91 KG/M2 | WEIGHT: 168 LBS

## 2024-09-26 DIAGNOSIS — M21.961 FOOT DEFORMITY, RIGHT: ICD-10-CM

## 2024-09-26 DIAGNOSIS — M21.862 ACQUIRED POSTERIOR EQUINUS OF BOTH LOWER EXTREMITIES: ICD-10-CM

## 2024-09-26 DIAGNOSIS — M21.70 ACQUIRED UNEQUAL LIMB LENGTH: ICD-10-CM

## 2024-09-26 DIAGNOSIS — R60.0 EDEMA OF RIGHT LOWER EXTREMITY: ICD-10-CM

## 2024-09-26 DIAGNOSIS — M19.071 ARTHRITIS OF MIDTARSAL JOINT OF RIGHT FOOT: ICD-10-CM

## 2024-09-26 DIAGNOSIS — M21.861 ACQUIRED POSTERIOR EQUINUS OF BOTH LOWER EXTREMITIES: ICD-10-CM

## 2024-09-26 DIAGNOSIS — M79.671 RIGHT FOOT PAIN: Primary | ICD-10-CM

## 2024-10-03 RX ORDER — DILTIAZEM HYDROCHLORIDE 180 MG/1
180 CAPSULE, EXTENDED RELEASE ORAL DAILY
Qty: 90 CAPSULE | Refills: 1 | Status: SHIPPED | OUTPATIENT
Start: 2024-10-03

## 2024-10-21 DIAGNOSIS — E03.9 HYPOTHYROIDISM, UNSPECIFIED TYPE: ICD-10-CM

## 2024-10-21 RX ORDER — LEVOTHYROXINE SODIUM 150 UG/1
150 TABLET ORAL DAILY
Qty: 90 TABLET | Refills: 1 | Status: SHIPPED | OUTPATIENT
Start: 2024-10-21 | End: 2024-10-28

## 2024-10-28 ENCOUNTER — OFFICE VISIT (OUTPATIENT)
Dept: SLEEP MEDICINE | Facility: CLINIC | Age: 69
End: 2024-10-28
Payer: MEDICARE

## 2024-10-28 VITALS
DIASTOLIC BLOOD PRESSURE: 54 MMHG | SYSTOLIC BLOOD PRESSURE: 108 MMHG | OXYGEN SATURATION: 97 % | HEIGHT: 66 IN | WEIGHT: 160 LBS | HEART RATE: 58 BPM | BODY MASS INDEX: 25.71 KG/M2

## 2024-10-28 DIAGNOSIS — E66.3 OVERWEIGHT WITH BODY MASS INDEX (BMI) 25.0-29.9: ICD-10-CM

## 2024-10-28 DIAGNOSIS — G47.33 OBSTRUCTIVE SLEEP APNEA: Primary | ICD-10-CM

## 2024-10-28 PROCEDURE — 3074F SYST BP LT 130 MM HG: CPT | Performed by: FAMILY MEDICINE

## 2024-10-28 PROCEDURE — 99213 OFFICE O/P EST LOW 20 MIN: CPT | Performed by: FAMILY MEDICINE

## 2024-10-28 PROCEDURE — 3078F DIAST BP <80 MM HG: CPT | Performed by: FAMILY MEDICINE

## 2024-10-28 PROCEDURE — G0463 HOSPITAL OUTPT CLINIC VISIT: HCPCS

## 2024-10-28 PROCEDURE — 1160F RVW MEDS BY RX/DR IN RCRD: CPT | Performed by: FAMILY MEDICINE

## 2024-10-28 PROCEDURE — 1159F MED LIST DOCD IN RCRD: CPT | Performed by: FAMILY MEDICINE

## 2024-10-28 RX ORDER — FLUOXETINE 40 MG/1
40 CAPSULE ORAL DAILY
Qty: 90 CAPSULE | Refills: 2 | Status: SHIPPED | OUTPATIENT
Start: 2024-10-28

## 2024-10-28 NOTE — PROGRESS NOTES
"Follow Up [facility] Disorders Center Note     Chief Complaint:  ADY     Primary Care Physician: Apollo Arellano APRN    Interval History:   The patient is a 69 y.o. female  who was last seen in the sleep lab: 2022 for HST which showed AHI 21 events per hour; at that time we did new auto CPAP machine.  Order placed for auto CPAP.  Presents today for annual follow-up.  Having mask fit issues; mask leaks air even if it is a new replacement mask.  Would like to try something new.    Downloaded PAP Data Reviewed For Compliance:  DME is [employer].  Downloads between 9/21/2024 - 10/20/2024.  Average usage is 8 hours 30 minutes.  Average AHI is 2.7.  Average auto CPAP pressure is 12.7 cm H2O    I have reviewed the above results and compared them with the patient's last downloads and reviewed with the patient.    Review of Systems:    A complete review of systems was done and all were negative with the exception of nasal congestion dry mouth    Social History:    Social History     Socioeconomic History    Marital status:    Tobacco Use    Smoking status: Some Days     Average packs/day: 0.3 packs/day for 3.4 years (0.9 ttl pk-yrs)     Types: Cigarettes     Start date: 1/1/2021    Smokeless tobacco: Never    Tobacco comments:     Off and on   Vaping Use    Vaping status: Former    Quit date: 6/7/2024    Passive vaping exposure: Yes   Substance and Sexual Activity    Alcohol use: Not Currently     Comment: ocassional    Drug use: Never    Sexual activity: Not Currently     Partners: Male     Birth control/protection: Other, Post-menopausal, Hysterectomy       Allergies:  Hydrocodone, Codeine, Oxycodone, and Nickel     Medication Review:  Reviewed.      Vital Signs:    Vitals:    10/28/24 1100   BP: 108/54   BP Location: Right arm   Patient Position: Sitting   Pulse: 58   SpO2: 97%   Weight: 72.6 kg (160 lb)   Height: 167.6 cm (66\")     Body mass index is 25.82 kg/m².    Physical Exam:    Constitutional:  Well " developed 69 y.o. female that appears in no apparent distress.  Awake & oriented times 3.  Normal mood with normal recent and remote memory and normal judgement.  Eyes:  Conjunctivae normal.  Oropharynx: Previously, moist mucous membranes.    Self-administered Lincoln Sleepiness Scale test results: 4  0-5 Lower normal daytime sleepiness  6-10 Higher normal daytime sleepiness  11-12 Mild, 13-15 Moderate, & 16-24 Severe excessive daytime sleepiness    Impression:   1. Obstructive sleep apnea    2. Overweight with body mass index (BMI) 25.0-29.9        Obstructive sleep apnea adequately treated with auto CPAP 6-18 cm H2O. The patient appears to be at goal with good compliance and usage. The patient has improved complaints of hypersomnolence.  Patient will contact DME about scheduling the mask fitting to try different type of mask.    Plan:  Good sleep hygiene measures should be maintained.  Weight loss would be beneficial in this patient who is overweight by Body mass index is 25.82 kg/m²..      After evaluating the patient and assessing results available, the patient is benefiting from the treatment being provided.     The patient will continue auto CPAP.  After clinical evaluation and review of downloads, I recommend no changes to the patient's pressures.  A new prescription will be sent to the patient's DME.    Caution during activities that require prolonged concentration is strongly advised if sleepiness returns. Changing of PAP supplies regularly is important for effective use. Patient needs to change cushion on the mask or plugs on nasal pillows along with disposable filters once every month and change mask frame, tubing, headgear and Velcro straps every 6 months at the minimum.    I answered all of the patient's questions.  The patient will call for any problems and will follow up in 1 year.      Andreea Morillo MD  Sleep Medicine  10/28/24  11:35 EDT

## 2024-10-28 NOTE — PROGRESS NOTES
"Subjective   History of Present Illness: Bernice Barrow is a 69 y.o. female is here today for follow-up on neck pain.  Since patient was last seen her low back pain has significantly improved.  She underwent injections and is not having any low back pain or leg pain at this time.  Today she complains of pain in her shoulder as well as some numbness and tingling.  She also complains of some difficulty with dropping things.  She says her primary issue is that she has pain with movement of the shoulder.  She describes the pain is in the front and back of the shoulder as well as into the lateral upper arm.  The pain does not radiate from her neck.    No chief complaint on file.         Previous treatment: NSAID'S    Previous neurosurgery:      Previous injections:     The following portions of the patient's history were reviewed and updated as appropriate: allergies, current medications, past family history, past medical history, past social history, past surgical history, and problem list.    Review of Systems   Constitutional:  Positive for activity change.   Respiratory:  Negative for chest tightness and shortness of breath.    Cardiovascular:  Negative for chest pain.   Musculoskeletal:  Positive for back pain, myalgias and neck pain.   Neurological:  Negative for weakness and numbness.       Objective      /78   Pulse 62   Resp 18   Ht 167.6 cm (66\")   Wt 77.6 kg (171 lb)   SpO2 96%   BMI 27.60 kg/m²    Body mass index is 27.6 kg/m².  There were no vitals filed for this visit.      Neurological Exam  Mental Status  Awake, alert and oriented to person, place and time.    Motor   Strength is 5/5 in all four extremities except as noted.  Pain related weakness of left shoulder.    Sensory  Sensation is intact to light touch, pinprick, vibration and proprioception in all four extremities.    Reflexes    Right pathological reflexes: Lori's absent.  Left pathological reflexes: Lori's " absent.    Significant pain in the left shoulder with range of motion      Assessment & Plan   Independent Review of Radiographic Studies:      I personally reviewed and interpreted the images from the following studies.    MRI cervical spine: Multilevel degenerative changes.  There is grade 1 spondylolisthesis of C3-4 with moderate central and foraminal stenosis.  There is moderate central stenosis at C4-5 as well as foraminal stenosis.  There is mild central and foraminal stenosis at C5-6 and C6-7.  No overt cord compression and no cord signal change    MRI lumbar spine: Multilevel degenerative changes most severe from L3-S1.  There is right-sided foraminal stenosis and severe disc degeneration at L3-4.  Severe degeneration and right-sided foraminal stenosis at L4-5.  There is grade 1 spondylolisthesis of L5-S1 with right worse than left foraminal stenosis    Scoliosis x-ray: There is a PI LL mismatch but overall compensated and balanced sagittally and coronally    Medical Decision Making:      Bernice Barrow is a 69 y.o. female previous history of neck pain and low back pain.  The low back pain has essentially resolved since she was last seen about 9 months ago.  Over the course the last several weeks she has developed pain consistent with intrinsic shoulder issue.  While she does have significant degenerative changes in her cervical spine as well as central and foraminal stenosis there is no overt cord compression and her symptoms are not consistent with myelopathy or radiculopathy.  I will send her for shoulder x-ray on the left and an orthopedic consult.  I will see her back as needed      Diagnoses and all orders for this visit:    1. DDD (degenerative disc disease), cervical (Primary)    2. Acquired spondylolisthesis of cervical vertebra    3. Cervical stenosis of spine    4. Spondylolisthesis of lumbar region      No follow-ups on file.    This patient was examined wearing appropriate personal  protective equipment.                      Dr. Zack Bajwa IV    11/01/24  10:21 EDT

## 2024-10-30 ENCOUNTER — OFFICE VISIT (OUTPATIENT)
Dept: FAMILY MEDICINE CLINIC | Facility: CLINIC | Age: 69
End: 2024-10-30
Payer: MEDICARE

## 2024-10-30 VITALS
SYSTOLIC BLOOD PRESSURE: 122 MMHG | BODY MASS INDEX: 27.19 KG/M2 | WEIGHT: 169.2 LBS | OXYGEN SATURATION: 100 % | HEART RATE: 56 BPM | HEIGHT: 66 IN | DIASTOLIC BLOOD PRESSURE: 78 MMHG

## 2024-10-30 DIAGNOSIS — I10 HYPERTENSION, UNSPECIFIED TYPE: ICD-10-CM

## 2024-10-30 DIAGNOSIS — G47.00 INSOMNIA, UNSPECIFIED TYPE: ICD-10-CM

## 2024-10-30 DIAGNOSIS — Z13.220 SCREENING, LIPID: ICD-10-CM

## 2024-10-30 DIAGNOSIS — E03.9 HYPOTHYROIDISM, UNSPECIFIED TYPE: Primary | ICD-10-CM

## 2024-10-30 PROCEDURE — 3074F SYST BP LT 130 MM HG: CPT | Performed by: NURSE PRACTITIONER

## 2024-10-30 PROCEDURE — 99214 OFFICE O/P EST MOD 30 MIN: CPT | Performed by: NURSE PRACTITIONER

## 2024-10-30 PROCEDURE — 3078F DIAST BP <80 MM HG: CPT | Performed by: NURSE PRACTITIONER

## 2024-10-30 PROCEDURE — 1125F AMNT PAIN NOTED PAIN PRSNT: CPT | Performed by: NURSE PRACTITIONER

## 2024-10-30 PROCEDURE — 3044F HG A1C LEVEL LT 7.0%: CPT | Performed by: NURSE PRACTITIONER

## 2024-10-30 PROCEDURE — 1159F MED LIST DOCD IN RCRD: CPT | Performed by: NURSE PRACTITIONER

## 2024-10-30 PROCEDURE — 1160F RVW MEDS BY RX/DR IN RCRD: CPT | Performed by: NURSE PRACTITIONER

## 2024-10-30 RX ORDER — HYDROXYZINE HYDROCHLORIDE 25 MG/1
25 TABLET, FILM COATED ORAL NIGHTLY PRN
Qty: 90 TABLET | Refills: 1 | Status: SHIPPED | OUTPATIENT
Start: 2024-10-30

## 2024-10-30 RX ORDER — HYDRALAZINE HYDROCHLORIDE 50 MG/1
50 TABLET, FILM COATED ORAL 2 TIMES DAILY
Qty: 180 TABLET | Refills: 1 | Status: SHIPPED | OUTPATIENT
Start: 2024-10-30

## 2024-10-30 RX ORDER — ATENOLOL 25 MG/1
25 TABLET ORAL DAILY
Qty: 90 TABLET | Refills: 2 | Status: SHIPPED | OUTPATIENT
Start: 2024-10-30

## 2024-10-30 NOTE — PROGRESS NOTES
"Chief Complaint  Thyroid Problem (Medication and labs) and Hypertension (Medication and labs)    Subjective          Bernice Barrow presents to [facility]  History of Present Illness    Is here today for follow up on her thyroid, hypertension, and wants to get labs to check cholesterol    Her BP is improved since starting her newest medication, atenolol  She also takes diltiazem, hydralazine, and hydrochlorothiazide    She denies any c/o chest pain, soa, edema, ha, dizziness, or weakness    Her current dose of levothyroxine is 175 mcg, she is taking it once daily as prescribed    Review of Systems   Constitutional:  Negative for fatigue.        She endorses that she feels fatigued some days, not consistently   Respiratory: Negative.  Negative for shortness of breath.    Cardiovascular:  Negative for chest pain and palpitations.   Gastrointestinal: Negative.  Negative for constipation and diarrhea.   Genitourinary: Negative.         Had bladder sling surgery in the past and now it seems to take a while to start the urine flow   Musculoskeletal:  Positive for arthralgias. Negative for myalgias.        Endorses shoulder, back, and neck pain - is going to see the back surgeon   Neurological: Negative.  Negative for dizziness, weakness and headaches.   Psychiatric/Behavioral:  Negative for sleep disturbance.      Objective   Vital Signs:  /78   Pulse 56   Ht 167.6 cm (66\")   Wt 76.7 kg (169 lb 3.2 oz)   SpO2 100%   BMI 27.31 kg/m²     BP Readings from Last 3 Encounters:   10/30/24 122/78   10/28/24 108/54   06/25/24 99/62        Wt Readings from Last 3 Encounters:   10/30/24 76.7 kg (169 lb 3.2 oz)   10/28/24 72.6 kg (160 lb)   09/26/24 76.2 kg (168 lb)          Physical Exam  Vitals reviewed.   Constitutional:       Appearance: Normal appearance.   Neck:      Vascular: No carotid bruit.   Cardiovascular:      Rate and Rhythm: Normal rate and regular rhythm.      Heart " sounds: Normal heart sounds.   Pulmonary:      Effort: Pulmonary effort is normal.      Breath sounds: Normal breath sounds.   Musculoskeletal:      Cervical back: Neck supple.      Right lower leg: No edema.      Left lower leg: No edema.   Skin:     General: Skin is warm.   Neurological:      Mental Status: She is alert and oriented to person, place, and time.   Psychiatric:         Mood and Affect: Mood normal.         Behavior: Behavior normal.        Result Review :                 Assessment and Plan    Diagnoses and all orders for this visit:    1. Hypothyroidism, unspecified type (Primary)  -     TSH Rfx On Abnormal To Free T4; Future    2. Hypertension, unspecified type  -     Comprehensive metabolic panel; Future  -     atenolol (TENORMIN) 25 MG tablet; Take 1 tablet by mouth Daily.  Dispense: 90 tablet; Refill: 2  -     hydrALAZINE (APRESOLINE) 50 MG tablet; Take 1 tablet by mouth 2 (Two) Times a Day.  Dispense: 180 tablet; Refill: 1    3. Screening, lipid  -     Lipid panel; Future  -     Comprehensive metabolic panel; Future    4. Insomnia, unspecified type  -     hydrOXYzine (ATARAX) 25 MG tablet; Take 1 tablet by mouth At Night As Needed for Anxiety.  Dispense: 90 tablet; Refill: 1           Follow Up   Return in about 6 months (around 4/30/2025), or as needed, for Medicare Wellness.  Patient was given instructions and counseling regarding her condition or for health maintenance advice. Please see specific information pulled into the AVS if appropriate.

## 2024-11-01 ENCOUNTER — OFFICE VISIT (OUTPATIENT)
Dept: NEUROSURGERY | Facility: CLINIC | Age: 69
End: 2024-11-01
Payer: MEDICARE

## 2024-11-01 VITALS
HEART RATE: 62 BPM | HEIGHT: 66 IN | DIASTOLIC BLOOD PRESSURE: 78 MMHG | WEIGHT: 171 LBS | OXYGEN SATURATION: 96 % | RESPIRATION RATE: 18 BRPM | SYSTOLIC BLOOD PRESSURE: 128 MMHG | BODY MASS INDEX: 27.48 KG/M2

## 2024-11-01 DIAGNOSIS — M25.512 PAIN IN JOINT OF LEFT SHOULDER: ICD-10-CM

## 2024-11-01 DIAGNOSIS — M48.02 CERVICAL STENOSIS OF SPINE: ICD-10-CM

## 2024-11-01 DIAGNOSIS — M43.16 SPONDYLOLISTHESIS OF LUMBAR REGION: ICD-10-CM

## 2024-11-01 DIAGNOSIS — M43.12 ACQUIRED SPONDYLOLISTHESIS OF CERVICAL VERTEBRA: ICD-10-CM

## 2024-11-01 DIAGNOSIS — M50.30 DDD (DEGENERATIVE DISC DISEASE), CERVICAL: Primary | ICD-10-CM

## 2024-11-14 ENCOUNTER — OFFICE VISIT (OUTPATIENT)
Dept: ORTHOPEDIC SURGERY | Facility: CLINIC | Age: 69
End: 2024-11-14
Payer: MEDICARE

## 2024-11-14 VITALS — HEIGHT: 66 IN | BODY MASS INDEX: 27.48 KG/M2 | WEIGHT: 171 LBS | OXYGEN SATURATION: 97 %

## 2024-11-14 DIAGNOSIS — M25.512 ACUTE PAIN OF LEFT SHOULDER: ICD-10-CM

## 2024-11-14 DIAGNOSIS — M19.012 ARTHRITIS OF LEFT GLENOHUMERAL JOINT: Primary | ICD-10-CM

## 2024-11-14 RX ORDER — LIDOCAINE HYDROCHLORIDE 10 MG/ML
2 INJECTION, SOLUTION EPIDURAL; INFILTRATION; INTRACAUDAL; PERINEURAL
Status: COMPLETED | OUTPATIENT
Start: 2024-11-14 | End: 2024-11-14

## 2024-11-14 RX ORDER — TRIAMCINOLONE ACETONIDE 40 MG/ML
80 INJECTION, SUSPENSION INTRA-ARTICULAR; INTRAMUSCULAR
Status: COMPLETED | OUTPATIENT
Start: 2024-11-14 | End: 2024-11-14

## 2024-11-14 RX ADMIN — LIDOCAINE HYDROCHLORIDE 2 ML: 10 INJECTION, SOLUTION EPIDURAL; INFILTRATION; INTRACAUDAL; PERINEURAL at 09:46

## 2024-11-14 RX ADMIN — TRIAMCINOLONE ACETONIDE 80 MG: 40 INJECTION, SUSPENSION INTRA-ARTICULAR; INTRAMUSCULAR at 09:46

## 2024-11-14 NOTE — PROGRESS NOTES
"Bernice is a 69 y.o. year old female presents to [facility]    Chief Complaint   Patient presents with    Left Shoulder - Initial Evaluation     Pain for 2 months, no injury.       History of Present Illness  Bernice Barrow is a right handed 69 y.o. female presents to clinic with , Jordi, for evaluation of LEFT shoulder pain for the past few months without any known injury. Qualifies pain sharp with over head activities. Pain at night, laying on left side. Provocative: overhead reaching, abduction. Tx: Activity modifications, heating pad, rest, mobic 15mg, tylenol, & Biofreeze.     I have reviewed the patient's medical, family, and social history in detail and updated the computerized patient record.    Objective:  Ht 167.6 cm (66\")   Wt 77.6 kg (171 lb)   SpO2 97%   BMI 27.60 kg/m²      Physical Exam    Vital signs reviewed.   General: No acute distress.  Eyes: conjunctiva clear  ENT: external ears atraumatic  CV: no peripheral edema  Resp: normal respiratory effort  Skin: no rashes or wounds; normal turgor  Psych: mood and affect appropriate; recent and remote memory intact  Neuro: sensation to light touch intact    MSK Exam    Left shoulder:  Intact skin. No apparent effusion. No atrophy  Tenderness over the anterior joint line and posterior joint lines.  Passive forward flexion 130+, abduction 120 mild pain, ER 25 with palpable crepitus over the joint  Active IR S1  Weakness and mild pain with [place] and supraspinatus/Reanna  Negative Garcia; negative scarf  Belly press 5/5; lift off 4/5 pain  Range of motion of the elbow, wrist and digits grossly intact  Radial pulse palpable capillary refill less than 2 seconds all digits   strength 5/5 and equal bilaterally  Sensation light touch intact in all distributions    Imaging:  XR Shoulder 2+ View Left (11/14/2024 08:51)       Assessment:  Diagnoses and all orders for this visit:    Arthritis of left glenohumeral " joint    Acute pain of left shoulder  -     XR Shoulder 2+ View Left    Other orders  -     Large Joint Arthrocentesis    Plan: Recommend intra-articular steroid injection to the left shoulder today to provide subjective pain relief and improved function.  Risk and benefits of this injection were discussed and patient wishes to proceed.  Recommend initiating passive stretching exercises in 3 to 4 days and to perform these for the next 4-5 weeks.  If symptoms worsen or fail to improve follow-up for reevaluation.  Follow-up as needed.       Large Joint Arthrocentesis: L glenohumeral  Date/Time: 11/14/2024 9:46 AM  Consent given by: patient  Site marked: site marked  Timeout: Immediately prior to procedure a time out was called to verify the correct patient, procedure, equipment, support staff and site/side marked as required   Supporting Documentation  Indications: pain   Procedure Details  Location: shoulder - L glenohumeral  Preparation: Patient was prepped and draped in the usual sterile fashion  Needle size: 25 G  Approach: posterior  Medications administered: 80 mg triamcinolone acetonide 40 MG/ML; 2 mL lidocaine PF 1% 1 %  Patient tolerance: patient tolerated the procedure well with no immediate complications        Follow Up   Return if symptoms worsen or fail to improve.  Patient was given instructions and counseling regarding her condition or for health maintenance advice. Please see specific information pulled into the AVS if appropriate.     EMR Dragon/Transcription disclaimer:    Much of this encounter note is an electronic transcription/translation of spoken language to printed text.  The electronic translation of spoken language may permit erroneous, or at times, nonsensical words or phrases to be inadvertently transcribed.  Although I have reviewed the note for such errors some may still exist.

## 2024-11-15 ENCOUNTER — PATIENT ROUNDING (BHMG ONLY) (OUTPATIENT)
Dept: ORTHOPEDIC SURGERY | Facility: CLINIC | Age: 69
End: 2024-11-15
Payer: MEDICARE

## 2024-11-15 NOTE — PROGRESS NOTES
A [employer] message has been sent to the patient for patient rounding

## 2024-11-18 DIAGNOSIS — G47.00 INSOMNIA, UNSPECIFIED TYPE: ICD-10-CM

## 2024-11-18 RX ORDER — HYDROXYZINE HYDROCHLORIDE 25 MG/1
25 TABLET, FILM COATED ORAL NIGHTLY PRN
Qty: 30 TABLET | Refills: 1 | Status: SHIPPED | OUTPATIENT
Start: 2024-11-18

## 2024-12-13 DIAGNOSIS — I10 HYPERTENSION, UNSPECIFIED TYPE: ICD-10-CM

## 2024-12-13 RX ORDER — HYDRALAZINE HYDROCHLORIDE 50 MG/1
50 TABLET, FILM COATED ORAL 2 TIMES DAILY
Qty: 60 TABLET | Refills: 1 | Status: SHIPPED | OUTPATIENT
Start: 2024-12-13

## 2024-12-16 RX ORDER — HYDROCHLOROTHIAZIDE 25 MG/1
25 TABLET ORAL DAILY
Qty: 90 TABLET | Refills: 1 | Status: SHIPPED | OUTPATIENT
Start: 2024-12-16

## 2025-01-13 ENCOUNTER — OFFICE (OUTPATIENT)
Dept: URBAN - METROPOLITAN AREA CLINIC 64 | Facility: CLINIC | Age: 70
End: 2025-01-13
Payer: COMMERCIAL

## 2025-01-13 VITALS
SYSTOLIC BLOOD PRESSURE: 140 MMHG | WEIGHT: 170 LBS | HEIGHT: 67 IN | DIASTOLIC BLOOD PRESSURE: 82 MMHG | HEART RATE: 68 BPM

## 2025-01-13 DIAGNOSIS — K59.09 OTHER CONSTIPATION: ICD-10-CM

## 2025-01-13 DIAGNOSIS — R13.10 DYSPHAGIA, UNSPECIFIED: ICD-10-CM

## 2025-01-13 DIAGNOSIS — K21.9 GASTRO-ESOPHAGEAL REFLUX DISEASE WITHOUT ESOPHAGITIS: ICD-10-CM

## 2025-01-13 DIAGNOSIS — D72.829 ELEVATED WHITE BLOOD CELL COUNT, UNSPECIFIED: ICD-10-CM

## 2025-01-13 DIAGNOSIS — D64.9 ANEMIA, UNSPECIFIED: ICD-10-CM

## 2025-01-13 DIAGNOSIS — Z12.11 ENCOUNTER FOR SCREENING FOR MALIGNANT NEOPLASM OF COLON: ICD-10-CM

## 2025-01-13 DIAGNOSIS — Z80.0 FAMILY HISTORY OF MALIGNANT NEOPLASM OF DIGESTIVE ORGANS: ICD-10-CM

## 2025-01-13 PROCEDURE — 99204 OFFICE O/P NEW MOD 45 MIN: CPT | Performed by: NURSE PRACTITIONER

## 2025-01-13 RX ORDER — PANTOPRAZOLE 40 MG/1
40 TABLET, DELAYED RELEASE ORAL
Qty: 90 | Refills: 3 | Status: ACTIVE
Start: 2025-01-13

## 2025-01-13 RX ORDER — OMEPRAZOLE MAGNESIUM 20.6 MG/1
20 TABLET, DELAYED RELEASE ORAL
Qty: 90 | Refills: 3 | Status: COMPLETED
End: 2025-01-13

## 2025-01-14 LAB
CBC WITH DIFFERENTIAL/PLATELET: BASO (ABSOLUTE): 0.2 X10E3/UL (ref 0–0.2)
CBC WITH DIFFERENTIAL/PLATELET: BASOS: 2 %
CBC WITH DIFFERENTIAL/PLATELET: EOS (ABSOLUTE): 0.6 X10E3/UL — HIGH (ref 0–0.4)
CBC WITH DIFFERENTIAL/PLATELET: EOS: 6 %
CBC WITH DIFFERENTIAL/PLATELET: HEMATOCRIT: 39.2 % (ref 34–46.6)
CBC WITH DIFFERENTIAL/PLATELET: HEMATOLOGY COMMENTS: (no result)
CBC WITH DIFFERENTIAL/PLATELET: HEMOGLOBIN: 12.1 G/DL (ref 11.1–15.9)
CBC WITH DIFFERENTIAL/PLATELET: IMMATURE CELLS: (no result)
CBC WITH DIFFERENTIAL/PLATELET: IMMATURE GRANS (ABS): 0.1 X10E3/UL (ref 0–0.1)
CBC WITH DIFFERENTIAL/PLATELET: IMMATURE GRANULOCYTES: 1 %
CBC WITH DIFFERENTIAL/PLATELET: LYMPHS (ABSOLUTE): 2.4 X10E3/UL (ref 0.7–3.1)
CBC WITH DIFFERENTIAL/PLATELET: LYMPHS: 23 %
CBC WITH DIFFERENTIAL/PLATELET: MCH: 26.4 PG — LOW (ref 26.6–33)
CBC WITH DIFFERENTIAL/PLATELET: MCHC: 30.9 G/DL — LOW (ref 31.5–35.7)
CBC WITH DIFFERENTIAL/PLATELET: MCV: 85 FL (ref 79–97)
CBC WITH DIFFERENTIAL/PLATELET: MONOCYTES(ABSOLUTE): 1 X10E3/UL — HIGH (ref 0.1–0.9)
CBC WITH DIFFERENTIAL/PLATELET: MONOCYTES: 10 %
CBC WITH DIFFERENTIAL/PLATELET: NEUTROPHILS (ABSOLUTE): 6.1 X10E3/UL (ref 1.4–7)
CBC WITH DIFFERENTIAL/PLATELET: NEUTROPHILS: 58 %
CBC WITH DIFFERENTIAL/PLATELET: NRBC: (no result)
CBC WITH DIFFERENTIAL/PLATELET: PLATELETS: 336 X10E3/UL (ref 150–450)
CBC WITH DIFFERENTIAL/PLATELET: RBC: 4.59 X10E6/UL (ref 3.77–5.28)
CBC WITH DIFFERENTIAL/PLATELET: RDW: 13.5 % (ref 11.7–15.4)
CBC WITH DIFFERENTIAL/PLATELET: WBC: 10.4 X10E3/UL (ref 3.4–10.8)
FERRITIN: 27 NG/ML (ref 15–150)
IRON AND TIBC: IRON BIND.CAP.(TIBC): 416 UG/DL (ref 250–450)
IRON AND TIBC: IRON SATURATION: 16 % (ref 15–55)
IRON AND TIBC: IRON: 68 UG/DL (ref 27–139)
IRON AND TIBC: UIBC: 348 UG/DL (ref 118–369)
VITAMIN B12 AND FOLATE: FOLATE (FOLIC ACID), SERUM: 16.3 NG/ML (ref 3–?)
VITAMIN B12 AND FOLATE: VITAMIN B12: 496 PG/ML (ref 232–1245)

## 2025-02-05 ENCOUNTER — ON CAMPUS - OUTPATIENT (AMBULATORY)
Dept: URBAN - METROPOLITAN AREA HOSPITAL 2 | Facility: HOSPITAL | Age: 70
End: 2025-02-05
Payer: MEDICARE

## 2025-02-05 VITALS
OXYGEN SATURATION: 98 % | DIASTOLIC BLOOD PRESSURE: 69 MMHG | HEART RATE: 65 BPM | HEART RATE: 61 BPM | SYSTOLIC BLOOD PRESSURE: 105 MMHG | SYSTOLIC BLOOD PRESSURE: 118 MMHG | DIASTOLIC BLOOD PRESSURE: 77 MMHG | SYSTOLIC BLOOD PRESSURE: 105 MMHG | SYSTOLIC BLOOD PRESSURE: 101 MMHG | OXYGEN SATURATION: 99 % | DIASTOLIC BLOOD PRESSURE: 71 MMHG | HEART RATE: 66 BPM | HEART RATE: 66 BPM | SYSTOLIC BLOOD PRESSURE: 127 MMHG | OXYGEN SATURATION: 100 % | SYSTOLIC BLOOD PRESSURE: 113 MMHG | SYSTOLIC BLOOD PRESSURE: 101 MMHG | DIASTOLIC BLOOD PRESSURE: 77 MMHG | RESPIRATION RATE: 15 BRPM | SYSTOLIC BLOOD PRESSURE: 127 MMHG | HEART RATE: 66 BPM | SYSTOLIC BLOOD PRESSURE: 112 MMHG | SYSTOLIC BLOOD PRESSURE: 113 MMHG | OXYGEN SATURATION: 100 % | HEART RATE: 69 BPM | HEART RATE: 64 BPM | RESPIRATION RATE: 15 BRPM | SYSTOLIC BLOOD PRESSURE: 112 MMHG | OXYGEN SATURATION: 98 % | RESPIRATION RATE: 16 BRPM | WEIGHT: 176 LBS | SYSTOLIC BLOOD PRESSURE: 112 MMHG | TEMPERATURE: 98.2 F | DIASTOLIC BLOOD PRESSURE: 53 MMHG | DIASTOLIC BLOOD PRESSURE: 86 MMHG | TEMPERATURE: 98.2 F | RESPIRATION RATE: 17 BRPM | SYSTOLIC BLOOD PRESSURE: 119 MMHG | RESPIRATION RATE: 15 BRPM | DIASTOLIC BLOOD PRESSURE: 65 MMHG | WEIGHT: 176 LBS | SYSTOLIC BLOOD PRESSURE: 119 MMHG | DIASTOLIC BLOOD PRESSURE: 61 MMHG | WEIGHT: 176 LBS | HEART RATE: 76 BPM | HEIGHT: 62 IN | HEART RATE: 76 BPM | HEIGHT: 62 IN | OXYGEN SATURATION: 98 % | SYSTOLIC BLOOD PRESSURE: 118 MMHG | DIASTOLIC BLOOD PRESSURE: 65 MMHG | HEART RATE: 65 BPM | DIASTOLIC BLOOD PRESSURE: 61 MMHG | HEART RATE: 67 BPM | RESPIRATION RATE: 17 BRPM | OXYGEN SATURATION: 99 % | SYSTOLIC BLOOD PRESSURE: 113 MMHG | RESPIRATION RATE: 16 BRPM | DIASTOLIC BLOOD PRESSURE: 77 MMHG | DIASTOLIC BLOOD PRESSURE: 53 MMHG | SYSTOLIC BLOOD PRESSURE: 127 MMHG | DIASTOLIC BLOOD PRESSURE: 74 MMHG | HEART RATE: 61 BPM | DIASTOLIC BLOOD PRESSURE: 69 MMHG | RESPIRATION RATE: 17 BRPM | OXYGEN SATURATION: 99 % | SYSTOLIC BLOOD PRESSURE: 85 MMHG | HEART RATE: 67 BPM | OXYGEN SATURATION: 100 % | SYSTOLIC BLOOD PRESSURE: 119 MMHG | HEART RATE: 67 BPM | DIASTOLIC BLOOD PRESSURE: 71 MMHG | HEIGHT: 62 IN | DIASTOLIC BLOOD PRESSURE: 65 MMHG | HEART RATE: 61 BPM | TEMPERATURE: 98.2 F | HEART RATE: 76 BPM | DIASTOLIC BLOOD PRESSURE: 86 MMHG | SYSTOLIC BLOOD PRESSURE: 105 MMHG | HEART RATE: 64 BPM | HEART RATE: 64 BPM | DIASTOLIC BLOOD PRESSURE: 74 MMHG | SYSTOLIC BLOOD PRESSURE: 85 MMHG | SYSTOLIC BLOOD PRESSURE: 101 MMHG | DIASTOLIC BLOOD PRESSURE: 53 MMHG | DIASTOLIC BLOOD PRESSURE: 74 MMHG | DIASTOLIC BLOOD PRESSURE: 86 MMHG | HEART RATE: 69 BPM | SYSTOLIC BLOOD PRESSURE: 118 MMHG | HEART RATE: 65 BPM | SYSTOLIC BLOOD PRESSURE: 85 MMHG | RESPIRATION RATE: 16 BRPM | DIASTOLIC BLOOD PRESSURE: 69 MMHG | HEART RATE: 69 BPM | DIASTOLIC BLOOD PRESSURE: 71 MMHG | DIASTOLIC BLOOD PRESSURE: 61 MMHG

## 2025-02-05 DIAGNOSIS — R13.10 DYSPHAGIA, UNSPECIFIED: ICD-10-CM

## 2025-02-05 DIAGNOSIS — Z12.11 ENCOUNTER FOR SCREENING FOR MALIGNANT NEOPLASM OF COLON: ICD-10-CM

## 2025-02-05 DIAGNOSIS — K44.9 DIAPHRAGMATIC HERNIA WITHOUT OBSTRUCTION OR GANGRENE: ICD-10-CM

## 2025-02-05 PROCEDURE — 43235 EGD DIAGNOSTIC BRUSH WASH: CPT | Performed by: INTERNAL MEDICINE

## 2025-02-05 PROCEDURE — 45378 DIAGNOSTIC COLONOSCOPY: CPT | Mod: PT | Performed by: INTERNAL MEDICINE

## 2025-02-05 PROCEDURE — 43450 DILATE ESOPHAGUS 1/MULT PASS: CPT | Performed by: INTERNAL MEDICINE

## 2025-02-05 PROCEDURE — G0121 COLON CA SCRN NOT HI RSK IND: HCPCS | Performed by: INTERNAL MEDICINE

## 2025-02-06 ENCOUNTER — OFFICE VISIT (OUTPATIENT)
Age: 70
End: 2025-02-06
Payer: MEDICARE

## 2025-02-06 VITALS — OXYGEN SATURATION: 97 % | HEART RATE: 68 BPM | HEIGHT: 66 IN | WEIGHT: 178.4 LBS | BODY MASS INDEX: 28.67 KG/M2

## 2025-02-06 DIAGNOSIS — M21.862 ACQUIRED POSTERIOR EQUINUS OF BOTH LOWER EXTREMITIES: ICD-10-CM

## 2025-02-06 DIAGNOSIS — M21.861 ACQUIRED POSTERIOR EQUINUS OF BOTH LOWER EXTREMITIES: ICD-10-CM

## 2025-02-06 DIAGNOSIS — M19.071 ARTHRITIS OF MIDTARSAL JOINT OF RIGHT FOOT: ICD-10-CM

## 2025-02-06 DIAGNOSIS — M79.671 RIGHT FOOT PAIN: Primary | ICD-10-CM

## 2025-02-06 DIAGNOSIS — M21.961 FOOT DEFORMITY, RIGHT: ICD-10-CM

## 2025-02-06 DIAGNOSIS — M21.70 ACQUIRED UNEQUAL LIMB LENGTH: ICD-10-CM

## 2025-02-06 PROBLEM — K44.9 HIATAL HERNIA: Status: ACTIVE | Noted: 2025-02-06

## 2025-02-06 RX ORDER — PANTOPRAZOLE SODIUM 40 MG/1
90 TABLET, DELAYED RELEASE ORAL
COMMUNITY
Start: 2025-01-13

## 2025-02-06 NOTE — PATIENT INSTRUCTIONS
Recommended Cobblers    [facility]  [address] [place] [address]  Phone: [phone]      [facility]  [address] [place] [id number]  Phone: [phone]

## 2025-02-07 PROBLEM — M79.671 RIGHT FOOT PAIN: Status: ACTIVE | Noted: 2025-02-06

## 2025-02-07 PROBLEM — M19.071 ARTHRITIS OF MIDTARSAL JOINT OF RIGHT FOOT: Status: ACTIVE | Noted: 2025-02-06

## 2025-02-07 PROBLEM — M21.961 FOOT DEFORMITY, RIGHT: Status: ACTIVE | Noted: 2025-02-06

## 2025-02-07 NOTE — PROGRESS NOTES
02/06/2025  Foot and Ankle Surgery - Established Patient/Follow-up  Provider: Dr. Sanjiv Yi DPM  Location: [facility]    Subjective:  Bernice Barrow is a 70 y.o. female.     Chief Complaint   Patient presents with    Right Foot - Follow-up, Edema, Pain     Would like another shot and discuss surgery procedure    Follow-Up     .Apollo Arellano, OLMAN-10/30/24         History of Present Illness  The patient presents for evaluation of right foot pain.    She reports severe pain in her right foot, which has been ongoing for approximately 5 months. The pain is so intense that it significantly impairs her ability to walk. She has not obtained a shoe lift but has been using Powerstep inserts and performing daily stretching exercises. Despite these interventions, she continues to experience discomfort. She expresses a desire to undergo a nerve clipping procedure as a potential solution to her pain. She also mentions a noticeable difference in the length of her right leg compared to her left when walking. She has previously tried various treatments, but none have provided relief. She has had both knees and her left hip replaced. She used to be 5 feet 7 inches tall and is now 5 feet 2 inches tall.      Allergies   Allergen Reactions    Hydrocodone Nausea And Vomiting    Codeine GI Intolerance    Oxycodone Nausea And Vomiting    Nickel Rash       Current Outpatient Medications on File Prior to Visit   Medication Sig Dispense Refill    atenolol (TENORMIN) 25 MG tablet Take 1 tablet by mouth Daily. 90 tablet 2    dilTIAZem (TIAZAC) 180 MG 24 hr capsule TAKE 1 CAPSULE BY MOUTH DAILY 90 capsule 1    FLUoxetine (PROzac) 40 MG capsule Take 1 capsule by mouth Daily. 90 capsule 2    hydrALAZINE (APRESOLINE) 50 MG tablet TAKE 1 TABLET BY MOUTH TWICE DAILY 60 tablet 1    hydroCHLOROthiazide 25 MG tablet TAKE 1 TABLET BY MOUTH DAILY 90 tablet 1    hydrOXYzine (ATARAX) 25 MG tablet TAKE 1 TABLET BY MOUTH AT  "NIGHT AS NEEDED FOR ANXIETY 30 tablet 1    lansoprazole (PREVACID) 15 MG capsule Take 2 capsules by mouth Daily.      levothyroxine (SYNTHROID, LEVOTHROID) 175 MCG tablet TAKE 1 CAPSULE BY MOUTH EVERY MORNING 90 tablet 1    meloxicam (MOBIC) 15 MG tablet Take 1 tablet by mouth Daily.      multivitamin with minerals tablet tablet Take 1 tablet by mouth Daily.      pantoprazole (PROTONIX) 40 MG EC tablet 90 tablets.       Current Facility-Administered Medications on File Prior to Visit   Medication Dose Route Frequency Provider Last Rate Last Admin    triamcinolone acetonide (KENALOG-40) injection 20 mg  20 mg Intra-articular Once KRIS Yi DPM           Objective   Pulse 68   Ht 167.6 cm (66\")   Wt 80.9 kg (178 lb 6.4 oz)   SpO2 97%   BMI 28.79 kg/m²     Foot/Ankle Exam  Physical Exam  GENERAL  Orientation:  AAOx3  Affect:  appropriate     VASCULAR      Right Foot Vascularity   Normal vascular exam    Dorsalis pedis:  2+  Posterior tibial:  2+  Skin temperature:  warm  Edema grading:  None  CFT:  < 3 seconds  Pedal hair growth:  Present  Varicosities:  none      Left Foot Vascularity   Normal vascular exam    Dorsalis pedis:  2+  Posterior tibial:  2+  Skin temperature:  warm  Edema grading:  None  CFT:  < 3 seconds  Pedal hair growth:  Present  Varicosities:  none     NEUROLOGIC      Right Foot Neurologic   Light touch sensation: normal  Hot/Cold sensation: normal  Achilles reflex:  2+      Left Foot Neurologic   Light touch sensation: normal  Hot/Cold sensation:  normal  Achilles reflex:  2+     MUSCULOSKELETAL      Right Foot Musculoskeletal   Arch:  Normal      Left Foot Musculoskeletal   Arch:  Normal     MUSCLE STRENGTH      Right Foot Muscle Strength   Normal strength    Foot dorsiflexion:  5  Foot plantar flexion:  5  Foot inversion:  5  Foot eversion:  5      Left Foot Muscle Strength   Normal strength    Foot dorsiflexion:  5  Foot plantar flexion:  5  Foot inversion:  5  Foot eversion:  5   "   DERMATOLOGIC       Right Foot Dermatologic   Skin  Right foot skin is intact.   Nails comment:  Nails 1-5      Left Foot Dermatologic   Skin  Left foot skin is intact.   Nails comment:  Nails 1-5     TESTS      Right Foot Tests   Anterior drawer: negative  Varus tilt: negative      Left Foot Tests   Anterior drawer: negative  Varus tilt: negative     Right foot additional comments: Significant pes planus foot structure and prominent rigidity involving the right foot. Bony prominence noted to various aspects of the dorsal aspect of the forefoot and midfoot. Diffuse discomfort with palpation. Swelling involving the right lower extremity. Moderate equinus contracture with knee extended and flexed.     03/28/2024  Continued discomfort involving the foot. No new issues or concerns.     8/14/24: Continued pain and discomfort involving the dorsal aspect of the right midfoot.  No progressive deformity or instability.  Mild swelling noted today.  Continued equinus contracture with knee extended and flexed.     9/26/24: Less discomfort and swelling involving the right midfoot.  No progressive deformity or instability.    2/6/25: Physical exam is unchanged.      Results  Imaging  X-ray shows limb length discrepancy, with the right side shorter than the left.    Assessment & Plan   Diagnoses and all orders for this visit:    1. Right foot pain (Primary)  -     Case Request; Standing  -     CBC (No Diff); Future  -     Basic Metabolic Panel; Future  -     XR Chest 2 View; Future  -     ECG 12 Lead; Future  -     ceFAZolin (ANCEF) 2,000 mg in sodium chloride 0.9 % 100 mL IVPB  -     Case Request    2. Acquired unequal limb length  -     Miscellaneous DME    3. 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 Foot deformity, right  -     Case Request; Standing  -     CBC (No Diff); Future  -     Basic Metabolic Panel; Future  -     XR Chest 2 View; Future  -     ECG 12 Lead; Future  -     ceFAZolin (ANCEF) 2,000 mg in sodium chloride 0.9 % 100 mL IVPB  -     Case Request    5. Acquired posterior equinus of both lower extremities    Other orders  -     Follow Anesthesia Guidelines / Protocol; Future  -     Follow Anesthesia Guidelines / Protocol; Standing  -     Verify / Perform Chlorhexidine Skin Prep; Standing  -     Provide NPO Instructions to Patient; Future  -     Chlorhexidine Skin Prep; Future  -     Place Sequential Compression Device; Standing  -     Maintain Sequential Compression Device; Standing      Assessment & Plan    Patient presents with continued pain and limitation involving the right foot.  She states that she has tried the inserts and appropriate shoes but continues to deal with limitations.  She did not obtain the shoe lift that was previously discussed.  She states that she would like to proceed with previously discussed deep peroneal nerve transection for symptom management.  I discussed the situation with her at length.  I do feel that it is very much necessary that she proceed with the shoe lift to help decrease the stress to the right foot and lower extremities to see if this helps improve her symptoms.  I have provided her with a prescription for a 3/8 inch shoe lift to obtain from a local cobbler.  We discussed proper use and effects with inserts and shoes.  I have asked that she continue supportive measures with massage topical anti-inflammatories and decreased overall activity.  Patient agrees and states that she will try the shoe lift.  I have asked to see her in 4 to 6 weeks for reevaluation.  If she continues to have prominent pain, we did discuss the possibility of deep peroneal nerve transection.  We did discuss the procedure, risk, goals, and recovery at length.   We will tentatively schedule the surgery at this time but she understands that if she is doing better with the shoe lift and supports that she may want to defer the surgery.  All questions were answered to patient's satisfaction.  Greater than 30 minutes was spent before, during, and after evaluation for patient care.           Patient or patient representative verbalized consent for the use of Ambient Listening during the visit with  KRIS Yi DPM for chart documentation. 2/7/2025  10:10 EST    KRIS Yi DPM

## 2025-02-22 DIAGNOSIS — E03.9 HYPOTHYROIDISM, UNSPECIFIED TYPE: ICD-10-CM

## 2025-02-24 RX ORDER — LEVOTHYROXINE SODIUM 175 UG/1
TABLET ORAL EVERY MORNING
Qty: 90 TABLET | Refills: 1 | Status: SHIPPED | OUTPATIENT
Start: 2025-02-24

## 2025-03-06 ENCOUNTER — OFFICE VISIT (OUTPATIENT)
Age: 70
End: 2025-03-06
Payer: MEDICARE

## 2025-03-06 VITALS — WEIGHT: 178 LBS | HEART RATE: 50 BPM | BODY MASS INDEX: 28.61 KG/M2 | OXYGEN SATURATION: 97 % | HEIGHT: 66 IN

## 2025-03-06 DIAGNOSIS — M21.961 FOOT DEFORMITY, RIGHT: ICD-10-CM

## 2025-03-06 DIAGNOSIS — M79.671 RIGHT FOOT PAIN: Primary | ICD-10-CM

## 2025-03-06 DIAGNOSIS — M21.70 ACQUIRED UNEQUAL LIMB LENGTH: ICD-10-CM

## 2025-03-06 DIAGNOSIS — M19.071 ARTHRITIS OF MIDTARSAL JOINT OF RIGHT FOOT: ICD-10-CM

## 2025-03-06 DIAGNOSIS — M21.861 ACQUIRED POSTERIOR EQUINUS OF BOTH LOWER EXTREMITIES: ICD-10-CM

## 2025-03-06 DIAGNOSIS — M21.862 ACQUIRED POSTERIOR EQUINUS OF BOTH LOWER EXTREMITIES: ICD-10-CM

## 2025-03-08 NOTE — PROGRESS NOTES
03/06/2025  Foot and Ankle Surgery - Established Patient/Follow-up  Provider: Dr. Sanjiv Yi DPM  Location: [facility]    Subjective:  Bernice Barrow is a 70 y.o. female.     Chief Complaint   Patient presents with    Right Foot - Follow-up, Edema, Pain     Would like another shot and discuss surgery procedure    Follow-Up     Apollo Arellano APRN  10/30/24       History of Present Illness  The patient presents for evaluation of limb length discrepancy.    She reports a significant improvement in her mobility, attributing this to the use of a shoe lift over the past 2 weeks. She has also invested in new footwear. She expresses a desire to avoid further surgical interventions. Additionally, she notes that her foot tends to deviate outward during ambulation. Her medical history includes bilateral knee replacements and a hip replacement.      Allergies   Allergen Reactions    Hydrocodone Nausea And Vomiting    Codeine GI Intolerance    Oxycodone Nausea And Vomiting    Nickel Rash       Current Outpatient Medications on File Prior to Visit   Medication Sig Dispense Refill    atenolol (TENORMIN) 25 MG tablet Take 1 tablet by mouth Daily. 90 tablet 2    dilTIAZem (TIAZAC) 180 MG 24 hr capsule TAKE 1 CAPSULE BY MOUTH DAILY 90 capsule 1    FLUoxetine (PROzac) 40 MG capsule Take 1 capsule by mouth Daily. 90 capsule 2    hydrALAZINE (APRESOLINE) 50 MG tablet TAKE 1 TABLET BY MOUTH TWICE DAILY 60 tablet 1    hydroCHLOROthiazide 25 MG tablet TAKE 1 TABLET BY MOUTH DAILY 90 tablet 1    hydrOXYzine (ATARAX) 25 MG tablet TAKE 1 TABLET BY MOUTH AT NIGHT AS NEEDED FOR ANXIETY 30 tablet 1    lansoprazole (PREVACID) 15 MG capsule Take 2 capsules by mouth Daily.      levothyroxine (SYNTHROID, LEVOTHROID) 175 MCG tablet TAKE 1 CAPSULE BY MOUTH EVERY MORNING 90 tablet 1    meloxicam (MOBIC) 15 MG tablet Take 1 tablet by mouth Daily.      multivitamin with minerals tablet tablet Take 1 tablet by mouth Daily.   "    pantoprazole (PROTONIX) 40 MG EC tablet 90 tablets.       Current Facility-Administered Medications on File Prior to Visit   Medication Dose Route Frequency Provider Last Rate Last Admin    triamcinolone acetonide (KENALOG-40) injection 20 mg  20 mg Intra-articular Once KRIS Yi DPM           Objective   Pulse 50   Ht 167.6 cm (66\")   Wt 80.7 kg (178 lb)   SpO2 97%   Breastfeeding No   BMI 28.73 kg/m²     Foot/Ankle Exam  Physical Exam    GENERAL  Orientation:  AAOx3  Affect:  appropriate     VASCULAR      Right Foot Vascularity   Normal vascular exam    Dorsalis pedis:  2+  Posterior tibial:  2+  Skin temperature:  warm  Edema grading:  None  CFT:  < 3 seconds  Pedal hair growth:  Present  Varicosities:  none      Left Foot Vascularity   Normal vascular exam    Dorsalis pedis:  2+  Posterior tibial:  2+  Skin temperature:  warm  Edema grading:  None  CFT:  < 3 seconds  Pedal hair growth:  Present  Varicosities:  none     NEUROLOGIC      Right Foot Neurologic   Light touch sensation: normal  Hot/Cold sensation: normal  Achilles reflex:  2+      Left Foot Neurologic   Light touch sensation: normal  Hot/Cold sensation:  normal  Achilles reflex:  2+     MUSCULOSKELETAL      Right Foot Musculoskeletal   Arch:  Normal      Left Foot Musculoskeletal   Arch:  Normal     MUSCLE STRENGTH      Right Foot Muscle Strength   Normal strength    Foot dorsiflexion:  5  Foot plantar flexion:  5  Foot inversion:  5  Foot eversion:  5      Left Foot Muscle Strength   Normal strength    Foot dorsiflexion:  5  Foot plantar flexion:  5  Foot inversion:  5  Foot eversion:  5     DERMATOLOGIC       Right Foot Dermatologic   Skin  Right foot skin is intact.   Nails comment:  Nails 1-5      Left Foot Dermatologic   Skin  Left foot skin is intact.   Nails comment:  Nails 1-5     TESTS      Right Foot Tests   Anterior drawer: negative  Varus tilt: negative      Left Foot Tests   Anterior drawer: negative  Varus tilt: " negative     Right foot additional comments: Significant pes planus foot structure and prominent rigidity involving the right foot. Bony prominence noted to various aspects of the dorsal aspect of the forefoot and midfoot. Diffuse discomfort with palpation. Swelling involving the right lower extremity. Moderate equinus contracture with knee extended and flexed.     03/28/2024  Continued discomfort involving the foot. No new issues or concerns.     8/14/24: Continued pain and discomfort involving the dorsal aspect of the right midfoot.  No progressive deformity or instability.  Mild swelling noted today.  Continued equinus contracture with knee extended and flexed.     9/26/24: Less discomfort and swelling involving the right midfoot.  No progressive deformity or instability.     2/6/25: Physical exam is unchanged.    3/6/25: Exam is unchanged.  No new issues or concerns.  Continued rigidity involving the midfoot.    Results      Assessment & Plan   Diagnoses and all orders for this visit:    1. Right foot pain (Primary)    2. Acquired unequal limb length    3. 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 Foot deformity, right    5. Acquired posterior equinus of both lower extremities      Assessment & Plan  She has been using a shoe lift for 2 weeks and reports significant improvement in her walking ability. It is recommended to continue using the shoe lift to allow her body to acclimate and potentially improve further. The goal is to reduce pain and improve function without surgery. She is advised to engage in low-impact exercises at the gym and avoid long-distance walking or uphill terrain to prevent exacerbation of symptoms. The scheduled surgery will be canceled for now but can be rescheduled if symptoms worsen.Reviewed proper basic stretching and manual therapy exercises along with appropriate shoes and activity.  Discussed proper use and/or avoidance of OTC anti-inflammatories.  Patient is to call with any  additional issues or concerns.  Greater than 20 minutes was spent before, during, and after evaluation for patient care.    Follow-up  The patient will follow up in 3 months.             Patient or patient representative verbalized consent for the use of Ambient Listening during the visit with  KRIS Yi DPM for chart documentation. 3/8/2025  16:35 EST    KRIS Yi DPM

## 2025-03-31 RX ORDER — DILTIAZEM HYDROCHLORIDE 180 MG/1
180 CAPSULE, EXTENDED RELEASE ORAL DAILY
Qty: 90 CAPSULE | Refills: 1 | Status: SHIPPED | OUTPATIENT
Start: 2025-03-31

## 2025-04-08 ENCOUNTER — OFFICE VISIT (OUTPATIENT)
Dept: ORTHOPEDIC SURGERY | Facility: CLINIC | Age: 70
End: 2025-04-08
Payer: MEDICARE

## 2025-04-08 VITALS — WEIGHT: 178 LBS | HEIGHT: 66 IN | OXYGEN SATURATION: 97 % | BODY MASS INDEX: 28.61 KG/M2

## 2025-04-08 DIAGNOSIS — G89.29 CHRONIC LEFT SHOULDER PAIN: ICD-10-CM

## 2025-04-08 DIAGNOSIS — M25.512 CHRONIC LEFT SHOULDER PAIN: ICD-10-CM

## 2025-04-08 DIAGNOSIS — M19.012 ARTHRITIS OF LEFT GLENOHUMERAL JOINT: Primary | ICD-10-CM

## 2025-04-08 PROCEDURE — 1159F MED LIST DOCD IN RCRD: CPT | Performed by: PHYSICIAN ASSISTANT

## 2025-04-08 PROCEDURE — 20610 DRAIN/INJ JOINT/BURSA W/O US: CPT | Performed by: PHYSICIAN ASSISTANT

## 2025-04-08 PROCEDURE — 1160F RVW MEDS BY RX/DR IN RCRD: CPT | Performed by: PHYSICIAN ASSISTANT

## 2025-04-08 PROCEDURE — 99213 OFFICE O/P EST LOW 20 MIN: CPT | Performed by: PHYSICIAN ASSISTANT

## 2025-04-08 RX ORDER — LIDOCAINE HYDROCHLORIDE 10 MG/ML
2 INJECTION, SOLUTION EPIDURAL; INFILTRATION; INTRACAUDAL; PERINEURAL
Status: COMPLETED | OUTPATIENT
Start: 2025-04-08 | End: 2025-04-08

## 2025-04-08 RX ORDER — TRIAMCINOLONE ACETONIDE 40 MG/ML
80 INJECTION, SUSPENSION INTRA-ARTICULAR; INTRAMUSCULAR
Status: COMPLETED | OUTPATIENT
Start: 2025-04-08 | End: 2025-04-08

## 2025-04-08 RX ADMIN — LIDOCAINE HYDROCHLORIDE 2 ML: 10 INJECTION, SOLUTION EPIDURAL; INFILTRATION; INTRACAUDAL; PERINEURAL at 13:32

## 2025-04-08 RX ADMIN — TRIAMCINOLONE ACETONIDE 80 MG: 40 INJECTION, SUSPENSION INTRA-ARTICULAR; INTRAMUSCULAR at 13:32

## 2025-04-08 NOTE — PROGRESS NOTES
"   Patient ID: Bernice Barrow is a 70 y.o. female  History of Present Illness  The patient presents for a follow-up of left shoulder pain.    She reports a significant exacerbation of her chronic left shoulder pain, which has been persistent for approximately 6 weeks. The pain is described as severe, akin to a stabbing sensation, and is localized to the same area. She experiences difficulty in raising her arm overhead due to the pain but does not report any associated weakness. The pain is so intense that it disrupts her sleep, leading to fatigue. She also mentions concurrent neck issues. She expresses a preference for another steroid injection at this time. She received a steroid injection on 11/14/2024, which provided relief until 01/2025. She has been managing the pain with heating pads, rest, meloxicam, Tylenol, and Biofreeze.    MEDICATIONS  Meloxicam, Tylenol, Biofreeze    Objective:  Ht 167.6 cm (66\")   Wt 80.7 kg (178 lb)   SpO2 97%   BMI 28.73 kg/m²     Physical Examination:     Left shoulder  Physical Exam  Left radial pulse in the upper extremity is palpable. Capillary refill is less than 1 second to all the digits. Sensation to light touch is intact in all distributions.  strength is 5/5 bilaterally. Range of motion of the digits in the left hand, left wrist, and left elbow are grossly intact. Upon visual inspection of the left shoulder, skin is intact with no ecchymosis or signs of infection. Mild warmth is present with no apparent atrophy. There is exquisite tenderness to palpation over the anterior joint line, tenderness over the posterior joint line, and tenderness over the greater tuberosity. Passive forward flexion is 85 with pain. Passive abduction is 80 with crepitus and pain. Passive external rotation is 55. Belly press is 5/5, pain free. Lift off is 4/5 with no pain. Positive Speed's test. Significant weakness and pain with supraspinatus/Reanna and Ida's test.    Imaging:   XR " Shoulder 2+ View Left (11/14/2024 08:51)   Findings:  There appears to be severe glenohumeral joint space narrowing. There is mild glenohumeral osteophyte formation. There is suggestion of subtle subchondral lucency/sclerosis at the superior-medial humeral head with possible mild depression of the articular   surface. This may be seen with avascular necrosis. There appear to be moderate degenerative changes at the acromioclavicular joint. No other definite acute fracture or malalignment. Osseous structures appear demineralized.     IMPRESSION:  Impression:  1.Severe glenohumeral joint space narrowing, likely related to osteoarthritis.  2.Findings as above which could be seen with avascular necrosis of the humeral head. This could be further evaluated with MRI if this would alter clinical management.  3.Moderate degenerative changes at the acromioclavicular joint.    Assessment:    Diagnoses and all orders for this visit:    1. Arthritis of left glenohumeral joint (Primary)  -     CT shoulder left wo contrast; Future    2. Chronic left shoulder pain  -     CT shoulder left wo contrast; Future    Other orders  -     Large Joint Arthrocentesis    Plan:   Assessment & Plan  1. Left shoulder pain.  The patient reports chronic left shoulder pain that has worsened over the past 6 weeks, making it difficult to raise her arm overhead and causing pain at night. Physical examination reveals tenderness over the anterior and posterior joint lines, mild warmth, and significant weakness and pain with supraspinatus/Reanna and Milam tests. X-ray from November shows advanced arthritis with bone-on-bone contact, cystic changes, and bone spurs. Treatment options discussed include another steroid injection or shoulder replacement surgery. She opted for a steroid injection today, which precludes surgery for 60 to 90 days due to infection risk.  The risk and benefits of this injection were discussed and she wishes to  proceed.    PROCEDURE  The patient received a steroid injection on 11/14/2024, which provided relief until 01/2025.     Large Joint Arthrocentesis: L glenohumeral  Date/Time: 4/8/2025 1:32 PM  Consent given by: patient  Site marked: site marked  Timeout: Immediately prior to procedure a time out was called to verify the correct patient, procedure, equipment, support staff and site/side marked as required   Supporting Documentation  Indications: pain   Procedure Details  Location: shoulder - L glenohumeral  Preparation: Patient was prepped and draped in the usual sterile fashion  Needle size: 25 G  Approach: posterior  Medications administered: 80 mg triamcinolone acetonide 40 MG/ML; 2 mL lidocaine PF 1% 1 %  Patient tolerance: patient tolerated the procedure well with no immediate complications      Patient or patient representative verbalized consent for the use of Ambient Listening during the visit with  Matthew Garcia PA-C for chart documentation. 4/8/2025  13:58 EDT  Disclaimer: Part of this note may be an electronic transcription/translation of spoken language to printed text using the Dragon Dictation System

## 2025-04-09 ENCOUNTER — HOSPITAL ENCOUNTER (OUTPATIENT)
Facility: HOSPITAL | Age: 70
Discharge: HOME | End: 2025-04-09
Attending: EMERGENCY MEDICINE | Admitting: EMERGENCY MEDICINE
Payer: MEDICARE

## 2025-04-09 VITALS
TEMPERATURE: 97.9 F | BODY MASS INDEX: 34.61 KG/M2 | HEIGHT: 62 IN | RESPIRATION RATE: 18 BRPM | OXYGEN SATURATION: 96 % | HEART RATE: 79 BPM | WEIGHT: 188.1 LBS | SYSTOLIC BLOOD PRESSURE: 144 MMHG | DIASTOLIC BLOOD PRESSURE: 78 MMHG

## 2025-04-09 DIAGNOSIS — N39.0 UTI (URINARY TRACT INFECTION), UNCOMPLICATED: Primary | ICD-10-CM

## 2025-04-09 LAB
BACTERIA UR QL AUTO: ABNORMAL /HPF
BILIRUB UR QL STRIP: NEGATIVE
CLARITY UR: CLEAR
COLOR UR: YELLOW
GLUCOSE UR STRIP-MCNC: NEGATIVE MG/DL
HGB UR QL STRIP.AUTO: NEGATIVE
HYALINE CASTS UR QL AUTO: ABNORMAL /LPF
KETONES UR QL STRIP: ABNORMAL
LEUKOCYTE ESTERASE UR QL STRIP.AUTO: ABNORMAL
NITRITE UR QL STRIP: NEGATIVE
PH UR STRIP.AUTO: 6.5 [PH] (ref 5–8)
PROT UR QL STRIP: ABNORMAL
RBC # UR STRIP: ABNORMAL /HPF
REF LAB TEST METHOD: ABNORMAL
SP GR UR STRIP: 1.01 (ref 1–1.03)
SQUAMOUS #/AREA URNS HPF: ABNORMAL /HPF
UROBILINOGEN UR QL STRIP: ABNORMAL
WBC # UR STRIP: ABNORMAL /HPF

## 2025-04-09 PROCEDURE — G0463 HOSPITAL OUTPT CLINIC VISIT: HCPCS | Performed by: EMERGENCY MEDICINE

## 2025-04-09 PROCEDURE — 87086 URINE CULTURE/COLONY COUNT: CPT | Performed by: EMERGENCY MEDICINE

## 2025-04-09 PROCEDURE — 81001 URINALYSIS AUTO W/SCOPE: CPT | Performed by: EMERGENCY MEDICINE

## 2025-04-09 RX ORDER — PHENAZOPYRIDINE HYDROCHLORIDE 200 MG/1
200 TABLET, FILM COATED ORAL 3 TIMES DAILY PRN
Qty: 6 TABLET | Refills: 0 | Status: SHIPPED | OUTPATIENT
Start: 2025-04-09

## 2025-04-09 RX ORDER — FLUCONAZOLE 150 MG/1
150 TABLET ORAL WEEKLY
Qty: 2 TABLET | Refills: 0 | Status: SHIPPED | OUTPATIENT
Start: 2025-04-09

## 2025-04-09 RX ORDER — CEPHALEXIN 500 MG/1
500 CAPSULE ORAL 3 TIMES DAILY
Qty: 15 CAPSULE | Refills: 0 | Status: SHIPPED | OUTPATIENT
Start: 2025-04-09

## 2025-04-09 NOTE — FSED PROVIDER NOTE
Subjective   History of Present Illness  Pt presents with ongoing mild severity dysuria with frequency and some burning and itching, no fevers, no n/v/d or abd pain and javier po well, no cp/soa/ha, had bladder sling placed in past but doesn't feel like it is as effective, no alleviating factors    History provided by:  Patient   used: No        Review of Systems   Constitutional: Negative.    Respiratory:  Negative for apnea.    Cardiovascular:  Negative for chest pain.   Genitourinary:  Positive for dysuria.   All other systems reviewed and are negative.      Past Medical History:   Diagnosis Date    Abnormal ECG     Allergic 01/01/1967    Allergies most of my life    Anemia     Ankle sprain     Arthritis     Arthritis of back     Arthritis of neck     Bronchitis     h/o    Bursitis of hip     Cataract [id number]    Cervical disc disorder     Chronic pain disorder     Depression [id number]    Difficulty walking     Extremity pain     Fracture, foot     GERD (gastroesophageal reflux disease)     Hammer toe     Hip arthrosis     Hypertension     Hypothyroidism [id number]    Joint pain     Knee sprain     Knee swelling     Low back pain     Lumbosacral disc disease     Neck pain     Peripheral neuropathy     Rheumatoid arthritis     Scoliosis [id number]    Shingles     Sleep apnea     Stress fracture     Thyroid disease     Visual impairment [id number]       Allergies   Allergen Reactions    Hydrocodone Nausea And Vomiting    Codeine GI Intolerance    Oxycodone Nausea And Vomiting    Nickel Rash       Past Surgical History:   Procedure Laterality Date    COLONOSCOPY      CORRECTION HAMMER TOE      ENDOSCOPY      FOOT FRACTURE SURGERY Right     FOOT SURGERY Bilateral     heel spurs    FRACTURE SURGERY      HYSTERECTOMY      JOINT REPLACEMENT  [id number]    ORTHOPEDIC SURGERY      REPLACEMENT TOTAL KNEE Right     2014    REPLACEMENT TOTAL KNEE Left     2021    TOTAL HIP ARTHROPLASTY Left 06/24/2024    Procedure:  TOTAL HIP ARTHROPLASTY ANTERIOR;  Surgeon: Alvaro Comer MD;  Location: [facility] MAIN OR;  Service: Orthopedics;  Laterality: Left;    TUBAL ABDOMINAL LIGATION         Family History   Problem Relation Age of Onset    Arthritis Mother     Depression Mother     Hyperlipidemia Mother     Hypertension Mother     Sleep apnea Father     Arthritis Father     Asthma Father     Cancer Father         Esophageal Ca    Sleep apnea Sister     COPD Sister     Depression Sister     Drug abuse Sister     Alcohol abuse Paternal Uncle     Early death Maternal Grandfather         Heart attack at 28 yrs old       Social History     Socioeconomic History    Marital status:    Tobacco Use    Smoking status: Some Days     Average packs/day: 0.3 packs/day for 3.4 years (0.9 ttl pk-yrs)     Types: Cigarettes     Start date: 1/1/2021     Passive exposure: Current    Smokeless tobacco: Never    Tobacco comments:     Off and on   Vaping Use    Vaping status: Former    Quit date: 6/7/2024    Passive vaping exposure: Yes   Substance and Sexual Activity    Alcohol use: Not Currently     Comment: ocassional    Drug use: Never    Sexual activity: Not Currently     Partners: Male     Birth control/protection: Other, Post-menopausal, Hysterectomy           Objective   Physical Exam  Vitals and nursing note reviewed.   HENT:      Head: Normocephalic.      Nose: Nose normal.   Eyes:      Conjunctiva/sclera: Conjunctivae normal.   Cardiovascular:      Rate and Rhythm: Normal rate.   Pulmonary:      Effort: Pulmonary effort is normal.   Abdominal:      Tenderness: There is no right CVA tenderness or left CVA tenderness.   Musculoskeletal:         General: Normal range of motion.      Cervical back: Normal range of motion.   Skin:     General: Skin is warm.   Neurological:      General: No focal deficit present.      Mental Status: She is alert.   Psychiatric:         Mood and Affect: Mood normal.         Procedures           ED Course                                            Medical Decision Making  Pt has Urology f/u, will cx urine and cover yeast pathogens        Final diagnoses:   UTI (urinary tract infection), uncomplicated       ED Disposition  ED Disposition       ED Disposition   Discharge    Condition   Stable    Comment   --               Apollo Arellano, APRN  [address]  [address]  [place] [address] [phone]    In 3 days  If symptoms worsen         Medication List        New Prescriptions      cephalexin 500 MG capsule  Commonly known as: KEFLEX  Take 1 capsule by mouth 3 (Three) Times a Day.     fluconazole 150 MG tablet  Commonly known as: DIFLUCAN  Take 1 tablet by mouth 1 (One) Time Per Week.     phenazopyridine 200 MG tablet  Commonly known as: PYRIDIUM  Take 1 tablet by mouth 3 (Three) Times a Day As Needed for Bladder Spasms or Dysuria.               Where to Get Your Medications        These medications were sent to [id number] - [place], [place] - [address] AT [address] - [phone]  - [phone] FX  [address] [place] [address]      Phone: [phone]   cephalexin 500 MG capsule  fluconazole 150 MG tablet  phenazopyridine 200 MG tablet

## 2025-04-11 LAB — BACTERIA SPEC AEROBE CULT: NO GROWTH

## 2025-04-30 ENCOUNTER — TELEPHONE (OUTPATIENT)
Dept: FAMILY MEDICINE CLINIC | Facility: CLINIC | Age: 70
End: 2025-04-30
Payer: MEDICARE

## 2025-05-07 ENCOUNTER — OFFICE VISIT (OUTPATIENT)
Dept: FAMILY MEDICINE CLINIC | Facility: CLINIC | Age: 70
End: 2025-05-07
Payer: MEDICARE

## 2025-05-07 ENCOUNTER — LAB (OUTPATIENT)
Dept: FAMILY MEDICINE CLINIC | Facility: CLINIC | Age: 70
End: 2025-05-07
Payer: MEDICARE

## 2025-05-07 VITALS
WEIGHT: 185 LBS | DIASTOLIC BLOOD PRESSURE: 74 MMHG | BODY MASS INDEX: 34.04 KG/M2 | HEIGHT: 62 IN | OXYGEN SATURATION: 98 % | SYSTOLIC BLOOD PRESSURE: 130 MMHG | RESPIRATION RATE: 16 BRPM | HEART RATE: 56 BPM

## 2025-05-07 DIAGNOSIS — G47.00 INSOMNIA, UNSPECIFIED TYPE: ICD-10-CM

## 2025-05-07 DIAGNOSIS — D64.9 ANEMIA, UNSPECIFIED TYPE: Primary | ICD-10-CM

## 2025-05-07 DIAGNOSIS — I10 HYPERTENSION, UNSPECIFIED TYPE: ICD-10-CM

## 2025-05-07 DIAGNOSIS — D64.9 ANEMIA, UNSPECIFIED TYPE: ICD-10-CM

## 2025-05-07 LAB
ANION GAP SERPL CALCULATED.3IONS-SCNC: 13 MMOL/L (ref 5–15)
BASOPHILS # BLD AUTO: 0.1 10*3/MM3 (ref 0–0.2)
BASOPHILS NFR BLD AUTO: 1.2 % (ref 0–1.5)
BUN SERPL-MCNC: 22 MG/DL (ref 8–23)
BUN/CREAT SERPL: 18.8 (ref 7–25)
CALCIUM SPEC-SCNC: 9.6 MG/DL (ref 8.6–10.5)
CHLORIDE SERPL-SCNC: 102 MMOL/L (ref 98–107)
CO2 SERPL-SCNC: 27 MMOL/L (ref 22–29)
CREAT SERPL-MCNC: 1.17 MG/DL (ref 0.57–1)
DEPRECATED RDW RBC AUTO: 40.4 FL (ref 37–54)
EGFRCR SERPLBLD CKD-EPI 2021: 50.9 ML/MIN/1.73
EOSINOPHIL # BLD AUTO: 0.43 10*3/MM3 (ref 0–0.4)
EOSINOPHIL NFR BLD AUTO: 5 % (ref 0.3–6.2)
ERYTHROCYTE [DISTWIDTH] IN BLOOD BY AUTOMATED COUNT: 13.8 % (ref 12.3–15.4)
FERRITIN SERPL-MCNC: 42 NG/ML (ref 13–150)
FOLATE SERPL-MCNC: >20 NG/ML (ref 4.78–24.2)
GLUCOSE SERPL-MCNC: 88 MG/DL (ref 65–99)
HCT VFR BLD AUTO: 38 % (ref 34–46.6)
HGB BLD-MCNC: 12.3 G/DL (ref 12–15.9)
IMM GRANULOCYTES # BLD AUTO: 0.09 10*3/MM3 (ref 0–0.05)
IMM GRANULOCYTES NFR BLD AUTO: 1.1 % (ref 0–0.5)
IRON 24H UR-MRATE: 57 MCG/DL (ref 37–145)
IRON SATN MFR SERPL: 13 % (ref 20–50)
LYMPHOCYTES # BLD AUTO: 2.17 10*3/MM3 (ref 0.7–3.1)
LYMPHOCYTES NFR BLD AUTO: 25.5 % (ref 19.6–45.3)
MCH RBC QN AUTO: 26.7 PG (ref 26.6–33)
MCHC RBC AUTO-ENTMCNC: 32.4 G/DL (ref 31.5–35.7)
MCV RBC AUTO: 82.4 FL (ref 79–97)
MONOCYTES # BLD AUTO: 0.74 10*3/MM3 (ref 0.1–0.9)
MONOCYTES NFR BLD AUTO: 8.7 % (ref 5–12)
NEUTROPHILS NFR BLD AUTO: 4.99 10*3/MM3 (ref 1.7–7)
NEUTROPHILS NFR BLD AUTO: 58.5 % (ref 42.7–76)
NRBC BLD AUTO-RTO: 0 /100 WBC (ref 0–0.2)
PLATELET # BLD AUTO: 339 10*3/MM3 (ref 140–450)
PMV BLD AUTO: 10.5 FL (ref 6–12)
POTASSIUM SERPL-SCNC: 3.8 MMOL/L (ref 3.5–5.2)
RBC # BLD AUTO: 4.61 10*6/MM3 (ref 3.77–5.28)
SODIUM SERPL-SCNC: 142 MMOL/L (ref 136–145)
TIBC SERPL-MCNC: 434 MCG/DL (ref 298–536)
TRANSFERRIN SERPL-MCNC: 291 MG/DL (ref 200–360)
VIT B12 BLD-MCNC: 535 PG/ML (ref 211–946)
WBC NRBC COR # BLD AUTO: 8.52 10*3/MM3 (ref 3.4–10.8)

## 2025-05-07 PROCEDURE — 1160F RVW MEDS BY RX/DR IN RCRD: CPT | Performed by: NURSE PRACTITIONER

## 2025-05-07 PROCEDURE — 3075F SYST BP GE 130 - 139MM HG: CPT | Performed by: NURSE PRACTITIONER

## 2025-05-07 PROCEDURE — 84466 ASSAY OF TRANSFERRIN: CPT | Performed by: NURSE PRACTITIONER

## 2025-05-07 PROCEDURE — 1125F AMNT PAIN NOTED PAIN PRSNT: CPT | Performed by: NURSE PRACTITIONER

## 2025-05-07 PROCEDURE — 36415 COLL VENOUS BLD VENIPUNCTURE: CPT

## 2025-05-07 PROCEDURE — 82728 ASSAY OF FERRITIN: CPT | Performed by: NURSE PRACTITIONER

## 2025-05-07 PROCEDURE — 85025 COMPLETE CBC W/AUTO DIFF WBC: CPT | Performed by: NURSE PRACTITIONER

## 2025-05-07 PROCEDURE — 83540 ASSAY OF IRON: CPT | Performed by: NURSE PRACTITIONER

## 2025-05-07 PROCEDURE — 99213 OFFICE O/P EST LOW 20 MIN: CPT | Performed by: NURSE PRACTITIONER

## 2025-05-07 PROCEDURE — 82607 VITAMIN B-12: CPT | Performed by: NURSE PRACTITIONER

## 2025-05-07 PROCEDURE — 1159F MED LIST DOCD IN RCRD: CPT | Performed by: NURSE PRACTITIONER

## 2025-05-07 PROCEDURE — 82746 ASSAY OF FOLIC ACID SERUM: CPT | Performed by: NURSE PRACTITIONER

## 2025-05-07 PROCEDURE — 3078F DIAST BP <80 MM HG: CPT | Performed by: NURSE PRACTITIONER

## 2025-05-07 PROCEDURE — 80048 BASIC METABOLIC PNL TOTAL CA: CPT | Performed by: NURSE PRACTITIONER

## 2025-05-07 RX ORDER — HYDROXYZINE HYDROCHLORIDE 25 MG/1
25 TABLET, FILM COATED ORAL NIGHTLY PRN
Qty: 30 TABLET | Refills: 1 | Status: SHIPPED | OUTPATIENT
Start: 2025-05-07

## 2025-05-07 RX ORDER — MELOXICAM 15 MG/1
15 TABLET ORAL DAILY
Qty: 90 TABLET | Refills: 2 | Status: SHIPPED | OUTPATIENT
Start: 2025-05-07

## 2025-05-07 NOTE — PATIENT INSTRUCTIONS
Some things that you can try for arthritis pain include:  Glucosamine  Collagen peptides  Turmeric with black pepper

## 2025-05-07 NOTE — PROGRESS NOTES
"Chief Complaint  Anemia and Arthritis    Subjective          Bernice Barrow presents to [facility]  History of Present Illness      Is here today with concerns about anemia  Was diagnosed by her Gi doc when she was getting her colonoscopy and EGD    She was advised to take a once daily prenatal vitamin     She endorses that she was feeling more energetic, improved sleep, less lethargy with the prenatal vitamin, she changed them and was not feeling so well again  She is going to go back to the initial product she was using    She also endorse that she has not been taking the meloxicam for the past 1 week and that her arthritis pain is worse  She stopped the meloxicam when she developed a UTI    Additionally today she needs some medicine refills, and her list to be updated      Review of Systems   Constitutional:  Positive for fatigue. Negative for activity change and appetite change.   Respiratory: Negative.     Cardiovascular: Negative.    Musculoskeletal:  Positive for arthralgias.   Neurological: Negative.      Objective   Vital Signs:  /74 (BP Location: Left arm, Patient Position: Sitting, Cuff Size: Adult)   Pulse 56   Resp 16   Ht 157.5 cm (62\")   Wt 83.9 kg (185 lb)   SpO2 98%   BMI 33.84 kg/m²     BP Readings from Last 3 Encounters:   05/07/25 130/74   04/09/25 144/78   11/01/24 128/78        Wt Readings from Last 3 Encounters:   05/07/25 83.9 kg (185 lb)   04/09/25 85.3 kg (188 lb 1.6 oz)   04/08/25 80.7 kg (178 lb)      Physical Exam  Vitals reviewed.   Constitutional:       Appearance: Normal appearance. She is obese.   Cardiovascular:      Rate and Rhythm: Normal rate and regular rhythm.      Pulses: Normal pulses.      Heart sounds: Normal heart sounds.   Pulmonary:      Effort: Pulmonary effort is normal.      Breath sounds: Normal breath sounds.   Musculoskeletal:      Right lower leg: No edema.      Left lower leg: No edema.   Skin:     General: " Skin is warm.   Neurological:      Mental Status: She is alert and oriented to person, place, and time.        Result Review :                 Assessment and Plan    Diagnoses and all orders for this visit:    1. Anemia, unspecified type (Primary)  -     CBC & Differential; Future  -     Iron and TIBC; Future  -     Vitamin B12; Future  -     Folate; Future  -     Ferritin    2. Hypertension, unspecified type  -     Basic metabolic panel; Future           Follow Up   Return as needed, for Next scheduled follow up.  Patient was given instructions and counseling regarding her condition or for health maintenance advice. Please see specific information pulled into the AVS if appropriate.

## 2025-05-13 ENCOUNTER — HOSPITAL ENCOUNTER (OUTPATIENT)
Dept: CT IMAGING | Facility: HOSPITAL | Age: 70
Discharge: HOME | End: 2025-05-13
Admitting: PHYSICIAN ASSISTANT
Payer: MEDICARE

## 2025-05-13 DIAGNOSIS — M19.012 ARTHRITIS OF LEFT GLENOHUMERAL JOINT: ICD-10-CM

## 2025-05-13 DIAGNOSIS — G89.29 CHRONIC LEFT SHOULDER PAIN: ICD-10-CM

## 2025-05-13 DIAGNOSIS — M25.512 CHRONIC LEFT SHOULDER PAIN: ICD-10-CM

## 2025-05-13 PROCEDURE — 73200 CT UPPER EXTREMITY W/O DYE: CPT

## 2025-05-27 DIAGNOSIS — I10 HYPERTENSION, UNSPECIFIED TYPE: ICD-10-CM

## 2025-05-27 RX ORDER — HYDRALAZINE HYDROCHLORIDE 50 MG/1
50 TABLET, FILM COATED ORAL 2 TIMES DAILY
Qty: 180 TABLET | Refills: 0 | Status: SHIPPED | OUTPATIENT
Start: 2025-05-27

## 2025-06-05 ENCOUNTER — OFFICE VISIT (OUTPATIENT)
Dept: ORTHOPEDIC SURGERY | Facility: CLINIC | Age: 70
End: 2025-06-05
Payer: MEDICARE

## 2025-06-05 ENCOUNTER — OFFICE VISIT (OUTPATIENT)
Age: 70
End: 2025-06-05
Payer: MEDICARE

## 2025-06-05 VITALS — WEIGHT: 185 LBS | HEIGHT: 62 IN | HEART RATE: 69 BPM | BODY MASS INDEX: 34.04 KG/M2

## 2025-06-05 VITALS — WEIGHT: 185 LBS | BODY MASS INDEX: 34.04 KG/M2 | HEIGHT: 62 IN | RESPIRATION RATE: 20 BRPM

## 2025-06-05 DIAGNOSIS — S42.112K: ICD-10-CM

## 2025-06-05 DIAGNOSIS — M19.012 ARTHRITIS OF LEFT GLENOHUMERAL JOINT: Primary | ICD-10-CM

## 2025-06-05 DIAGNOSIS — M21.861 ACQUIRED POSTERIOR EQUINUS OF BOTH LOWER EXTREMITIES: ICD-10-CM

## 2025-06-05 DIAGNOSIS — M19.071 ARTHRITIS OF MIDTARSAL JOINT OF RIGHT FOOT: ICD-10-CM

## 2025-06-05 DIAGNOSIS — M21.862 ACQUIRED POSTERIOR EQUINUS OF BOTH LOWER EXTREMITIES: ICD-10-CM

## 2025-06-05 DIAGNOSIS — M21.961 FOOT DEFORMITY, RIGHT: ICD-10-CM

## 2025-06-05 DIAGNOSIS — M21.70 ACQUIRED UNEQUAL LIMB LENGTH: ICD-10-CM

## 2025-06-05 DIAGNOSIS — M79.671 RIGHT FOOT PAIN: Primary | ICD-10-CM

## 2025-06-05 NOTE — PROGRESS NOTES
Patient ID: Bernice Barrow is a 70 y.o. female.    Chief Complaint:    Chief Complaint   Patient presents with    Left Shoulder - Pain, Follow-up     Pain 2       HPI:  This is a 70-year-old female here with longstanding left shoulder pain.  She has been treated with oral medication and steroid injections most recently in April of this year.  She did suffer a fall earlier this year with increasing pain.  The pain is localized over the anterior and superior aspect of the shoulder no prior shoulder surgery  Past Medical History:   Diagnosis Date    Abnormal ECG     Allergic 01/01/1967    Allergies most of my life    Anemia     Ankle sprain     Arthritis     Arthritis of back     Arthritis of neck     Bronchitis     h/o    Bursitis of hip     Cataract [id number]    Cervical disc disorder     Chronic pain disorder     Depression [id number]    Difficulty walking     Extremity pain     Fracture, foot     GERD (gastroesophageal reflux disease)     Hammer toe     Hip arthrosis     Hypertension     Hypothyroidism [id number]    Joint pain     Knee sprain     Knee swelling     Low back pain     Lumbosacral disc disease     Neck pain     Peripheral neuropathy     Rheumatoid arthritis     Scoliosis [id number]    Shingles     Sleep apnea     Stress fracture     Thyroid disease     Visual impairment [id number]       Past Surgical History:   Procedure Laterality Date    COLONOSCOPY      CORRECTION HAMMER TOE      ENDOSCOPY      FOOT FRACTURE SURGERY Right     FOOT SURGERY Bilateral     heel spurs    FRACTURE SURGERY      HYSTERECTOMY      JOINT REPLACEMENT  [id number]    ORTHOPEDIC SURGERY      REPLACEMENT TOTAL KNEE Right     2014    REPLACEMENT TOTAL KNEE Left     2021    TOTAL HIP ARTHROPLASTY Left 06/24/2024    Procedure: TOTAL HIP ARTHROPLASTY ANTERIOR;  Surgeon: Alvaro Comer MD;  Location: [facility] MAIN OR;  Service: Orthopedics;  Laterality: Left;    TUBAL ABDOMINAL LIGATION         Family History   Problem Relation Age  "of Onset    Arthritis Mother     Depression Mother     Hyperlipidemia Mother     Hypertension Mother     Sleep apnea Father     Arthritis Father     Asthma Father     Cancer Father         Esophageal Ca    Sleep apnea Sister     COPD Sister     Depression Sister     Drug abuse Sister     Alcohol abuse Paternal Uncle     Early death Maternal Grandfather         Heart attack at 30 yrs old          Social History     Occupational History    Not on file   Tobacco Use    Smoking status: Some Days     Average packs/day: 0.3 packs/day for 3.4 years (0.9 ttl pk-yrs)     Types: Cigarettes     Start date: 1/1/2021     Passive exposure: Current    Smokeless tobacco: Never    Tobacco comments:     Off and on   Vaping Use    Vaping status: Former    Quit date: 6/7/2024    Passive vaping exposure: Yes   Substance and Sexual Activity    Alcohol use: Not Currently     Comment: ocassional    Drug use: Never    Sexual activity: Not Currently     Partners: Male     Birth control/protection: Other, Post-menopausal, Hysterectomy      Review of Systems   Cardiovascular:  Negative for chest pain.   Musculoskeletal:  Positive for arthralgias.       Objective:    Pulse 69   Ht 157.5 cm (62\")   Wt 83.9 kg (185 lb)   BMI 33.84 kg/m²     Physical Examination:  Left shoulder demonstrates intact skin mild pain over the acromion and bicep groove passive elevation 150 external rotation 30 belly press and liftoff are 4/5  Sensory and motor exam are intact all distributions. Radial pulse is palpable and capillary refill is less than two seconds to all digits.    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  Because of the acromion fracture I recommend attempting to heal that and so I recommend a bone stimulator.  She states the fracture likely occurred with the fall in February so it is over 3 months old without signs of healing.  See me with x-ray in 2 months  Greater than 15 minutes was spent demonstrating proper fit and use of the device and signs to monitor for complications  Discussed the risks including bleeding, scar, infection, stiffness, nerve, artery, vein and tendon damage, instability, fracture, DVT, loss of life or limb. Issues of scapular notching and component revision were discussed. Questions were answered and addressed.      Procedures         Disclaimer: Part of this note may be an electronic transcription/translation of spoken language to printed text using the Dragon Dictation System

## 2025-06-06 DIAGNOSIS — I10 HYPERTENSION, UNSPECIFIED TYPE: ICD-10-CM

## 2025-06-06 RX ORDER — ATENOLOL 25 MG/1
25 TABLET ORAL DAILY
Qty: 30 TABLET | Refills: 1 | Status: SHIPPED | OUTPATIENT
Start: 2025-06-06

## 2025-06-06 NOTE — PROGRESS NOTES
06/05/2025  Foot and Ankle Surgery - Established Patient/Follow-up  Provider: Dr. Sanjiv Yi DPM  Location: [facility]    Subjective:  Bernice Barrow is a 70 y.o. female.     Chief Complaint   Patient presents with    Right Foot - Follow-up, Edema, Pain    Follow-up     PCP: Apollo Arellano APRN  Last PCP Visit: 6/18/24         History of Present Illness  The patient is a 70-year-old female who presents for evaluation of right foot pain.    She reports persistent pain in her right foot, which she describes as throbbing and severe, particularly in the middle of the foot. The intensity of the pain varies depending on her activities throughout the day. She also experiences pain in her toe and occasional severe pain in her ankle. She has been utilizing shoe lifts for the past 2 months, which have provided some relief. She has a history of surgical interventions on both feet, including bone spur removal and fracture repair of the second metatarsal bone. She has previously received steroid injections, which have provided temporary relief.      Allergies   Allergen Reactions    Hydrocodone Nausea And Vomiting    Codeine GI Intolerance    Oxycodone Nausea And Vomiting    Nickel Rash       Current Outpatient Medications on File Prior to Visit   Medication Sig Dispense Refill    atenolol (TENORMIN) 25 MG tablet Take 1 tablet by mouth Daily. 90 tablet 2    dilTIAZem (TIAZAC) 180 MG 24 hr capsule TAKE 1 CAPSULE BY MOUTH DAILY 90 capsule 1    FLUoxetine (PROzac) 40 MG capsule Take 1 capsule by mouth Daily. 90 capsule 2    hydrALAZINE (APRESOLINE) 50 MG tablet TAKE 1 TABLET BY MOUTH TWICE DAILY 180 tablet 0    hydroCHLOROthiazide 25 MG tablet TAKE 1 TABLET BY MOUTH DAILY 90 tablet 1    hydrOXYzine (ATARAX) 25 MG tablet TAKE 1 TABLET BY MOUTH AT NIGHT AS NEEDED FOR ANXIETY 30 tablet 1    levothyroxine (SYNTHROID, LEVOTHROID) 175 MCG tablet TAKE 1 CAPSULE BY MOUTH EVERY MORNING 90 tablet 1    meloxicam  "(MOBIC) 15 MG tablet TAKE 1 TABLET BY MOUTH DAILY 90 tablet 2    multivitamin with minerals tablet tablet Take 1 tablet by mouth Daily.      pantoprazole (PROTONIX) 40 MG EC tablet 90 tablets.       Current Facility-Administered Medications on File Prior to Visit   Medication Dose Route Frequency Provider Last Rate Last Admin    triamcinolone acetonide (KENALOG-40) injection 20 mg  20 mg Intra-articular Once KRIS Yi DPM           Objective   Resp 20   Ht 157.5 cm (62\")   Wt 83.9 kg (185 lb)   BMI 33.84 kg/m²     Foot/Ankle Exam  Physical Exam  GENERAL  Orientation:  AAOx3  Affect:  appropriate     VASCULAR      Right Foot Vascularity   Normal vascular exam    Dorsalis pedis:  2+  Posterior tibial:  2+  Skin temperature:  warm  Edema grading:  None  CFT:  < 3 seconds  Pedal hair growth:  Present  Varicosities:  none      Left Foot Vascularity   Normal vascular exam    Dorsalis pedis:  2+  Posterior tibial:  2+  Skin temperature:  warm  Edema grading:  None  CFT:  < 3 seconds  Pedal hair growth:  Present  Varicosities:  none     NEUROLOGIC      Right Foot Neurologic   Light touch sensation: normal  Hot/Cold sensation: normal  Achilles reflex:  2+      Left Foot Neurologic   Light touch sensation: normal  Hot/Cold sensation:  normal  Achilles reflex:  2+     MUSCULOSKELETAL      Right Foot Musculoskeletal   Arch:  Normal      Left Foot Musculoskeletal   Arch:  Normal     MUSCLE STRENGTH      Right Foot Muscle Strength   Normal strength    Foot dorsiflexion:  5  Foot plantar flexion:  5  Foot inversion:  5  Foot eversion:  5      Left Foot Muscle Strength   Normal strength    Foot dorsiflexion:  5  Foot plantar flexion:  5  Foot inversion:  5  Foot eversion:  5     DERMATOLOGIC       Right Foot Dermatologic   Skin  Right foot skin is intact.   Nails comment:  Nails 1-5      Left Foot Dermatologic   Skin  Left foot skin is intact.   Nails comment:  Nails 1-5     TESTS      Right Foot Tests   Anterior drawer: " negative  Varus tilt: negative      Left Foot Tests   Anterior drawer: negative  Varus tilt: negative     Right foot additional comments: Significant pes planus foot structure and prominent rigidity involving the right foot. Bony prominence noted to various aspects of the dorsal aspect of the forefoot and midfoot. Diffuse discomfort with palpation. Swelling involving the right lower extremity. Moderate equinus contracture with knee extended and flexed.     03/28/2024  Continued discomfort involving the foot. No new issues or concerns.     8/14/24: Continued pain and discomfort involving the dorsal aspect of the right midfoot.  No progressive deformity or instability.  Mild swelling noted today.  Continued equinus contracture with knee extended and flexed.     9/26/24: Less discomfort and swelling involving the right midfoot.  No progressive deformity or instability.     2/6/25: Physical exam is unchanged.     3/6/25: Exam is unchanged.  No new issues or concerns.  Continued rigidity involving the midfoot.    6/5/25: Continued discomfort involving the right midfoot.  No significant changes as compared to previous exam.      Results  Imaging  X-ray of the right foot shows a functional limb length discrepancy of about 1/8 inch, significant arthritis in the rear foot and midfoot, and a flat foot due to previous fracture to the second metatarsal.    Assessment & Plan   Diagnoses and all orders for this visit:    1. Right foot pain (Primary)  -     XR Foot 3+ View Right    2. Acquired unequal limb length    3. 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 Foot deformity, right    5. Acquired posterior equinus of both lower extremities      Assessment & Plan    Patient continues to have pain involving the right foot despite shoe lift.  She states that she has noticed some improvement with the left but continues to have prominent discomfort mostly to the dorsal aspect of the foot and ankle.  Repeat imaging was obtained  showing significant degenerative changes involving the midfoot along with osteopenia.  The forefoot remains relatively unchanged.  There are degenerative changes involving the subtalar joint as well.  I did discuss the imaging, diagnoses, and treatment options with her at length.  Given that she continues to have limitation in pain, I do feel that options are limited to surgical measures as she is unwilling to wear a brace on a daily basis.  Did review reconstructive efforts but she does not want to consider extensive surgery.  We also reviewed deep peroneal nerve transection with stump relocation.  I discussed the procedure, risk, goals, and recovery with her at length.  She understands that she has at risk of continued pain and limitation despite surgery.  We did discuss potential bleeding risk, numbness, and tingling.  Patient understands and agrees and would like to proceed with surgical planning.  She is to call the office with any additional questions or concerns.  Greater than 30 minutes was spent before, during, and after evaluation for patient care.             Patient or patient representative verbalized consent for the use of Ambient Listening during the visit with  KRIS Yi DPM for chart documentation. 6/6/2025  07:12 EDT    KRIS Yi DPM

## 2025-06-09 ENCOUNTER — OFFICE VISIT (OUTPATIENT)
Dept: ORTHOPEDIC SURGERY | Facility: CLINIC | Age: 70
End: 2025-06-09
Payer: MEDICARE

## 2025-06-09 VITALS — HEART RATE: 66 BPM | HEIGHT: 62 IN | BODY MASS INDEX: 34.04 KG/M2 | WEIGHT: 185 LBS | OXYGEN SATURATION: 96 %

## 2025-06-09 DIAGNOSIS — Z96.642 S/P HIP REPLACEMENT, LEFT: Primary | ICD-10-CM

## 2025-06-09 NOTE — PROGRESS NOTES
Subjective:     Patient ID: Bernice Barrow is a 70 y.o. female.    Chief Complaint:    History of Present Illness  Bernice Barrow returns to clinic today for evaluation of left hip status post total hip arthroplasty performed by myself 1 year ago.  She is no longer attending physical therapy and progressing well.  The patient denies any fevers chills or drainage from their surgical incision. She is happy with the result so far. She states that the pain and swelling are improving.  She is ambulatory today with no limp or assistive device       Social History     Occupational History    Not on file   Tobacco Use    Smoking status: Some Days     Average packs/day: 0.3 packs/day for 3.4 years (0.9 ttl pk-yrs)     Types: Cigarettes     Start date: 1/1/2021     Passive exposure: Current    Smokeless tobacco: Never    Tobacco comments:     Off and on   Vaping Use    Vaping status: Former    Quit date: 6/7/2024    Passive vaping exposure: Yes   Substance and Sexual Activity    Alcohol use: Not Currently     Comment: ocassional    Drug use: Never    Sexual activity: Not Currently     Partners: Male     Birth control/protection: Other, Post-menopausal, Hysterectomy      Past Medical History:   Diagnosis Date    Abnormal ECG     Allergic 01/01/1967    Allergies most of my life    Anemia     Ankle sprain     Arthritis     Arthritis of back     Arthritis of neck     Bronchitis     h/o    Bursitis of hip     Cataract [id number]    Cervical disc disorder     Chronic pain disorder     Depression [id number]    Difficulty walking     Extremity pain     Fracture, foot     GERD (gastroesophageal reflux disease)     Hammer toe     Hip arthrosis     Hypertension     Hypothyroidism [id number]    Joint pain     Knee sprain     Knee swelling     Low back pain     Lumbosacral disc disease     Neck pain     Peripheral neuropathy     Rheumatoid arthritis     Scoliosis [id number]    Shingles     Sleep apnea     Stress fracture     Thyroid  "disease     Visual impairment [id number]     Past Surgical History:   Procedure Laterality Date    COLONOSCOPY      CORRECTION HAMMER TOE      ENDOSCOPY      FOOT FRACTURE SURGERY Right     FOOT SURGERY Bilateral     heel spurs    FRACTURE SURGERY      HYSTERECTOMY      JOINT REPLACEMENT  [id number]    ORTHOPEDIC SURGERY      REPLACEMENT TOTAL KNEE Right     2014    REPLACEMENT TOTAL KNEE Left     2021    TOTAL HIP ARTHROPLASTY Left 06/24/2024    Procedure: TOTAL HIP ARTHROPLASTY ANTERIOR;  Surgeon: Alvaro Comer MD;  Location: [facility] MAIN OR;  Service: Orthopedics;  Laterality: Left;    TUBAL ABDOMINAL LIGATION         Family History   Problem Relation Age of Onset    Arthritis Mother     Depression Mother     Hyperlipidemia Mother     Hypertension Mother     Sleep apnea Father     Arthritis Father     Asthma Father     Cancer Father         Esophageal Ca    Scoliosis Father     Sleep apnea Sister     COPD Sister     Depression Sister     Drug abuse Sister     Alcohol abuse Paternal Uncle     Early death Maternal Grandfather         Heart attack at 30 yrs old                 Objective:  Vitals:    06/09/25 0919   Pulse: 66   SpO2: 96%   Weight: 83.9 kg (185 lb)   Height: 157.5 cm (62\")         06/09/25 0919   Weight: 83.9 kg (185 lb)     Body mass index is 33.84 kg/m².           Left Hip Exam     Tenderness   The patient is experiencing no tenderness.     Range of Motion   Flexion:  normal   External rotation:  normal   Internal rotation: normal     Muscle Strength   Flexion: 5/5     Tests   KAREN: negative  Fadir:  Negative FADIR test    Other   Erythema: absent  Scars: present  Sensation: normal  Pulse: present               Imaging: Standing AP pelvis was ordered and reviewed by myself in the office today  Indication: left hip replacement  Findings: X-rays demonstrate a left total hip arthroplasty with implants in expected position. Leg lengths and offset appear clinically equal based off radiographic markers. "  No signs of fracture, dislocation, subluxation, subsidence, or migration.  Comparative studies: last visit radiographs      Assessment:        1. S/P hip replacement, left           Plan:          Discussed treatment options at length with patient at today's visit. I discussed with the patient that they are doing well from my standpoint.  I am happy with the progress that they have made.  They are ambulatory today with no assistive device and no limp or residual deficits.  I would like them to continue to work on range of motion and strengthening exercises.   The patient has no restrictions from my standpoint.  The patient's surgical incision is healed without signs of infection.  I would like the patient to notify our office immediately if they note any increasing redness, pain, fevers, chills, or drainage of any kind from their surgical incision as this would be abnormal at this point in time.  I discussed with the patient that at this point in time we do not need to see them back for another follow-up appointment.  I did discuss however that I am happy to see the patient at any point if issues questions or concerns arise.  The patient voiced understanding and agreement with the plan.   Follow up: as needed      Bernice Barrow was in agreement with plan and had all questions answered.     Medications:  No orders of the defined types were placed in this encounter.      Followup:  No follow-ups on file.    Diagnoses and all orders for this visit:    1. S/P hip replacement, left (Primary)  -     XR Pelvis 1 or 2 View          Dictated utilizing Dragon dictation

## 2025-06-19 RX ORDER — HYDROCHLOROTHIAZIDE 25 MG/1
25 TABLET ORAL DAILY
Qty: 90 TABLET | Refills: 1 | Status: SHIPPED | OUTPATIENT
Start: 2025-06-19

## 2025-07-11 ENCOUNTER — LAB (OUTPATIENT)
Dept: LAB | Facility: HOSPITAL | Age: 70
End: 2025-07-11
Payer: MEDICARE

## 2025-07-11 ENCOUNTER — HOSPITAL ENCOUNTER (OUTPATIENT)
Dept: GENERAL RADIOLOGY | Facility: HOSPITAL | Age: 70
Discharge: HOME | End: 2025-07-11
Payer: MEDICARE

## 2025-07-11 ENCOUNTER — HOSPITAL ENCOUNTER (OUTPATIENT)
Dept: CARDIOLOGY | Facility: HOSPITAL | Age: 70
Discharge: HOME | End: 2025-07-11
Payer: MEDICARE

## 2025-07-11 DIAGNOSIS — M79.671 RIGHT FOOT PAIN: ICD-10-CM

## 2025-07-11 DIAGNOSIS — M21.961 FOOT DEFORMITY, RIGHT: ICD-10-CM

## 2025-07-11 DIAGNOSIS — M19.071 ARTHRITIS OF MIDTARSAL JOINT OF RIGHT FOOT: ICD-10-CM

## 2025-07-11 LAB
ANION GAP SERPL CALCULATED.3IONS-SCNC: 12.4 MMOL/L (ref 5–15)
BUN SERPL-MCNC: 21 MG/DL (ref 8–23)
BUN/CREAT SERPL: 15.6 (ref 7–25)
CALCIUM SPEC-SCNC: 9.5 MG/DL (ref 8.6–10.5)
CHLORIDE SERPL-SCNC: 101 MMOL/L (ref 98–107)
CO2 SERPL-SCNC: 22.6 MMOL/L (ref 22–29)
CREAT SERPL-MCNC: 1.35 MG/DL (ref 0.57–1)
DEPRECATED RDW RBC AUTO: 41.2 FL (ref 37–54)
EGFRCR SERPLBLD CKD-EPI 2021: 42.9 ML/MIN/1.73
ERYTHROCYTE [DISTWIDTH] IN BLOOD BY AUTOMATED COUNT: 13.3 % (ref 12.3–15.4)
GLUCOSE SERPL-MCNC: 97 MG/DL (ref 65–99)
HCT VFR BLD AUTO: 35.2 % (ref 34–46.6)
HGB BLD-MCNC: 11.4 G/DL (ref 12–15.9)
MCH RBC QN AUTO: 28 PG (ref 26.6–33)
MCHC RBC AUTO-ENTMCNC: 32.4 G/DL (ref 31.5–35.7)
MCV RBC AUTO: 86.5 FL (ref 79–97)
PLATELET # BLD AUTO: 331 10*3/MM3 (ref 140–450)
PMV BLD AUTO: 10.7 FL (ref 6–12)
POTASSIUM SERPL-SCNC: 4.3 MMOL/L (ref 3.5–5.2)
RBC # BLD AUTO: 4.07 10*6/MM3 (ref 3.77–5.28)
SODIUM SERPL-SCNC: 136 MMOL/L (ref 136–145)
WBC NRBC COR # BLD AUTO: 9.02 10*3/MM3 (ref 3.4–10.8)

## 2025-07-11 PROCEDURE — 36415 COLL VENOUS BLD VENIPUNCTURE: CPT

## 2025-07-11 PROCEDURE — 85027 COMPLETE CBC AUTOMATED: CPT

## 2025-07-11 PROCEDURE — 80048 BASIC METABOLIC PNL TOTAL CA: CPT

## 2025-07-11 PROCEDURE — 93005 ELECTROCARDIOGRAM TRACING: CPT | Performed by: PODIATRIST

## 2025-07-11 PROCEDURE — 71046 X-RAY EXAM CHEST 2 VIEWS: CPT

## 2025-07-12 LAB
QT INTERVAL: 416 MS
QTC INTERVAL: 429 MS

## 2025-07-18 ENCOUNTER — ANESTHESIA (OUTPATIENT)
Dept: PERIOP | Facility: HOSPITAL | Age: 70
End: 2025-07-18
Payer: MEDICARE

## 2025-07-18 ENCOUNTER — ANESTHESIA EVENT (OUTPATIENT)
Dept: PERIOP | Facility: HOSPITAL | Age: 70
End: 2025-07-18
Payer: MEDICARE

## 2025-07-18 ENCOUNTER — HOSPITAL ENCOUNTER (OUTPATIENT)
Facility: HOSPITAL | Age: 70
Setting detail: HOSPITAL OUTPATIENT SURGERY
Discharge: HOME | End: 2025-07-18
Attending: PODIATRIST | Admitting: PODIATRIST
Payer: MEDICARE

## 2025-07-18 VITALS
OXYGEN SATURATION: 96 % | HEART RATE: 58 BPM | TEMPERATURE: 97.8 F | WEIGHT: 186 LBS | RESPIRATION RATE: 20 BRPM | SYSTOLIC BLOOD PRESSURE: 106 MMHG | DIASTOLIC BLOOD PRESSURE: 70 MMHG | HEIGHT: 62 IN | BODY MASS INDEX: 34.23 KG/M2

## 2025-07-18 DIAGNOSIS — M79.671 RIGHT FOOT PAIN: ICD-10-CM

## 2025-07-18 DIAGNOSIS — M21.961 FOOT DEFORMITY, RIGHT: ICD-10-CM

## 2025-07-18 DIAGNOSIS — M19.071 ARTHRITIS OF MIDTARSAL JOINT OF RIGHT FOOT: ICD-10-CM

## 2025-07-18 PROCEDURE — 25010000002 CEFAZOLIN PER 500 MG: Performed by: PODIATRIST

## 2025-07-18 PROCEDURE — 25010000002 LIDOCAINE 1 % SOLUTION: Performed by: PODIATRIST

## 2025-07-18 PROCEDURE — 25010000002 FENTANYL CITRATE (PF) 50 MCG/ML SOLUTION

## 2025-07-18 PROCEDURE — 25010000002 DEXAMETHASONE PER 1 MG

## 2025-07-18 PROCEDURE — 25010000002 FENTANYL CITRATE (PF) 100 MCG/2ML SOLUTION

## 2025-07-18 PROCEDURE — 64784 REMOVE NERVE LESION: CPT | Performed by: PODIATRIST

## 2025-07-18 PROCEDURE — 25010000002 PROPOFOL 10 MG/ML EMULSION

## 2025-07-18 PROCEDURE — 25810000003 LACTATED RINGERS PER 1000 ML: Performed by: PODIATRIST

## 2025-07-18 PROCEDURE — 25010000002 HYDROMORPHONE 1 MG/ML SOLUTION

## 2025-07-18 PROCEDURE — 25010000002 VASOPRESSIN 20 UNIT/ML SOLUTION

## 2025-07-18 PROCEDURE — S0260 H&P FOR SURGERY: HCPCS | Performed by: PODIATRIST

## 2025-07-18 PROCEDURE — 25010000002 ONDANSETRON PER 1 MG

## 2025-07-18 PROCEDURE — 64787 IMPLANT NERVE END: CPT | Performed by: PODIATRIST

## 2025-07-18 PROCEDURE — 25010000002 BUPIVACAINE 0.5 % SOLUTION: Performed by: PODIATRIST

## 2025-07-18 PROCEDURE — 25010000002 LIDOCAINE PF 2% 2 % SOLUTION

## 2025-07-18 PROCEDURE — 25810000003 LACTATED RINGERS PER 1000 ML

## 2025-07-18 RX ORDER — EPHEDRINE SULFATE 5 MG/ML
5 INJECTION INTRAVENOUS ONCE AS NEEDED
Status: DISCONTINUED | OUTPATIENT
Start: 2025-07-18 | End: 2025-07-18 | Stop reason: HOSPADM

## 2025-07-18 RX ORDER — NALOXONE HCL 0.4 MG/ML
0.4 VIAL (ML) INJECTION AS NEEDED
Status: DISCONTINUED | OUTPATIENT
Start: 2025-07-18 | End: 2025-07-18 | Stop reason: HOSPADM

## 2025-07-18 RX ORDER — HYDROCODONE BITARTRATE AND ACETAMINOPHEN 7.5; 325 MG/1; MG/1
1 TABLET ORAL EVERY 6 HOURS PRN
Qty: 28 TABLET | Refills: 0 | Status: SHIPPED | OUTPATIENT
Start: 2025-07-18

## 2025-07-18 RX ORDER — OXYCODONE HYDROCHLORIDE 5 MG/1
10 TABLET ORAL EVERY 4 HOURS PRN
Status: DISCONTINUED | OUTPATIENT
Start: 2025-07-18 | End: 2025-07-18 | Stop reason: HOSPADM

## 2025-07-18 RX ORDER — LABETALOL HYDROCHLORIDE 5 MG/ML
5 INJECTION, SOLUTION INTRAVENOUS
Status: DISCONTINUED | OUTPATIENT
Start: 2025-07-18 | End: 2025-07-18 | Stop reason: HOSPADM

## 2025-07-18 RX ORDER — SODIUM CHLORIDE, SODIUM LACTATE, POTASSIUM CHLORIDE, CALCIUM CHLORIDE 600; 310; 30; 20 MG/100ML; MG/100ML; MG/100ML; MG/100ML
1000 INJECTION, SOLUTION INTRAVENOUS ONCE
Status: COMPLETED | OUTPATIENT
Start: 2025-07-18 | End: 2025-07-18

## 2025-07-18 RX ORDER — DIPHENHYDRAMINE HYDROCHLORIDE 50 MG/ML
12.5 INJECTION, SOLUTION INTRAMUSCULAR; INTRAVENOUS ONCE AS NEEDED
Status: DISCONTINUED | OUTPATIENT
Start: 2025-07-18 | End: 2025-07-18 | Stop reason: HOSPADM

## 2025-07-18 RX ORDER — EPHEDRINE SULFATE 5 MG/ML
INJECTION INTRAVENOUS AS NEEDED
Status: DISCONTINUED | OUTPATIENT
Start: 2025-07-18 | End: 2025-07-18 | Stop reason: SURG

## 2025-07-18 RX ORDER — VASOPRESSIN 20 [USP'U]/ML
INJECTION, SOLUTION INTRAVENOUS AS NEEDED
Status: DISCONTINUED | OUTPATIENT
Start: 2025-07-18 | End: 2025-07-18 | Stop reason: SURG

## 2025-07-18 RX ORDER — SODIUM CHLORIDE 0.9 % (FLUSH) 0.9 %
10 SYRINGE (ML) INJECTION AS NEEDED
Status: DISCONTINUED | OUTPATIENT
Start: 2025-07-18 | End: 2025-07-18 | Stop reason: HOSPADM

## 2025-07-18 RX ORDER — SODIUM CHLORIDE, SODIUM LACTATE, POTASSIUM CHLORIDE, CALCIUM CHLORIDE 600; 310; 30; 20 MG/100ML; MG/100ML; MG/100ML; MG/100ML
INJECTION, SOLUTION INTRAVENOUS CONTINUOUS PRN
Status: DISCONTINUED | OUTPATIENT
Start: 2025-07-18 | End: 2025-07-18 | Stop reason: SURG

## 2025-07-18 RX ORDER — ONDANSETRON 4 MG/1
4 TABLET, FILM COATED ORAL EVERY 8 HOURS PRN
Qty: 20 TABLET | Refills: 0 | Status: SHIPPED | OUTPATIENT
Start: 2025-07-18

## 2025-07-18 RX ORDER — LIDOCAINE HYDROCHLORIDE 20 MG/ML
INJECTION, SOLUTION EPIDURAL; INFILTRATION; INTRACAUDAL; PERINEURAL AS NEEDED
Status: DISCONTINUED | OUTPATIENT
Start: 2025-07-18 | End: 2025-07-18 | Stop reason: SURG

## 2025-07-18 RX ORDER — OXYCODONE HYDROCHLORIDE 5 MG/1
5 TABLET ORAL ONCE AS NEEDED
Status: COMPLETED | OUTPATIENT
Start: 2025-07-18 | End: 2025-07-18

## 2025-07-18 RX ORDER — HYDRALAZINE HYDROCHLORIDE 20 MG/ML
5 INJECTION INTRAMUSCULAR; INTRAVENOUS
Status: DISCONTINUED | OUTPATIENT
Start: 2025-07-18 | End: 2025-07-18 | Stop reason: HOSPADM

## 2025-07-18 RX ORDER — ONDANSETRON 2 MG/ML
INJECTION INTRAMUSCULAR; INTRAVENOUS AS NEEDED
Status: DISCONTINUED | OUTPATIENT
Start: 2025-07-18 | End: 2025-07-18 | Stop reason: SURG

## 2025-07-18 RX ORDER — LIDOCAINE HYDROCHLORIDE 10 MG/ML
INJECTION, SOLUTION INFILTRATION; PERINEURAL AS NEEDED
Status: DISCONTINUED | OUTPATIENT
Start: 2025-07-18 | End: 2025-07-18 | Stop reason: HOSPADM

## 2025-07-18 RX ORDER — BUPIVACAINE HYDROCHLORIDE 5 MG/ML
INJECTION, SOLUTION PERINEURAL AS NEEDED
Status: DISCONTINUED | OUTPATIENT
Start: 2025-07-18 | End: 2025-07-18 | Stop reason: HOSPADM

## 2025-07-18 RX ORDER — PROPOFOL 10 MG/ML
VIAL (ML) INTRAVENOUS AS NEEDED
Status: DISCONTINUED | OUTPATIENT
Start: 2025-07-18 | End: 2025-07-18 | Stop reason: SURG

## 2025-07-18 RX ORDER — IPRATROPIUM BROMIDE AND ALBUTEROL SULFATE 2.5; .5 MG/3ML; MG/3ML
3 SOLUTION RESPIRATORY (INHALATION) ONCE AS NEEDED
Status: DISCONTINUED | OUTPATIENT
Start: 2025-07-18 | End: 2025-07-18 | Stop reason: HOSPADM

## 2025-07-18 RX ORDER — DIPHENHYDRAMINE HYDROCHLORIDE 50 MG/ML
12.5 INJECTION, SOLUTION INTRAMUSCULAR; INTRAVENOUS
Status: DISCONTINUED | OUTPATIENT
Start: 2025-07-18 | End: 2025-07-18 | Stop reason: HOSPADM

## 2025-07-18 RX ORDER — LIDOCAINE HYDROCHLORIDE 10 MG/ML
0.5 INJECTION, SOLUTION EPIDURAL; INFILTRATION; INTRACAUDAL; PERINEURAL ONCE AS NEEDED
Status: DISCONTINUED | OUTPATIENT
Start: 2025-07-18 | End: 2025-07-18 | Stop reason: HOSPADM

## 2025-07-18 RX ORDER — DEXAMETHASONE SODIUM PHOSPHATE 4 MG/ML
INJECTION, SOLUTION INTRA-ARTICULAR; INTRALESIONAL; INTRAMUSCULAR; INTRAVENOUS; SOFT TISSUE AS NEEDED
Status: DISCONTINUED | OUTPATIENT
Start: 2025-07-18 | End: 2025-07-18 | Stop reason: SURG

## 2025-07-18 RX ORDER — ONDANSETRON 2 MG/ML
4 INJECTION INTRAMUSCULAR; INTRAVENOUS ONCE AS NEEDED
Status: COMPLETED | OUTPATIENT
Start: 2025-07-18 | End: 2025-07-18

## 2025-07-18 RX ORDER — FENTANYL CITRATE 50 UG/ML
50 INJECTION, SOLUTION INTRAMUSCULAR; INTRAVENOUS
Status: DISCONTINUED | OUTPATIENT
Start: 2025-07-18 | End: 2025-07-18 | Stop reason: HOSPADM

## 2025-07-18 RX ORDER — FENTANYL CITRATE 50 UG/ML
INJECTION, SOLUTION INTRAMUSCULAR; INTRAVENOUS AS NEEDED
Status: DISCONTINUED | OUTPATIENT
Start: 2025-07-18 | End: 2025-07-18 | Stop reason: SURG

## 2025-07-18 RX ADMIN — SODIUM CHLORIDE, POTASSIUM CHLORIDE, SODIUM LACTATE AND CALCIUM CHLORIDE 1000 ML: 600; 310; 30; 20 INJECTION, SOLUTION INTRAVENOUS at 06:30

## 2025-07-18 RX ADMIN — PROPOFOL 25 MG: 10 INJECTION, EMULSION INTRAVENOUS at 08:15

## 2025-07-18 RX ADMIN — VASOPRESSIN 2 UNITS: 20 INJECTION INTRAVENOUS at 08:21

## 2025-07-18 RX ADMIN — VASOPRESSIN 2 UNITS: 20 INJECTION INTRAVENOUS at 08:34

## 2025-07-18 RX ADMIN — VASOPRESSIN 2 UNITS: 20 INJECTION INTRAVENOUS at 08:51

## 2025-07-18 RX ADMIN — HYDROMORPHONE HYDROCHLORIDE 1 MG: 1 INJECTION, SOLUTION INTRAMUSCULAR; INTRAVENOUS; SUBCUTANEOUS at 09:57

## 2025-07-18 RX ADMIN — OXYCODONE 5 MG: 5 TABLET ORAL at 10:15

## 2025-07-18 RX ADMIN — PROPOFOL 25 MG: 10 INJECTION, EMULSION INTRAVENOUS at 08:12

## 2025-07-18 RX ADMIN — FENTANYL CITRATE 50 MCG: 50 INJECTION, SOLUTION INTRAMUSCULAR; INTRAVENOUS at 09:40

## 2025-07-18 RX ADMIN — LIDOCAINE HYDROCHLORIDE 100 MG: 20 INJECTION, SOLUTION EPIDURAL; INFILTRATION; INTRACAUDAL; PERINEURAL at 08:10

## 2025-07-18 RX ADMIN — SODIUM CHLORIDE, SODIUM LACTATE, POTASSIUM CHLORIDE, AND CALCIUM CHLORIDE: .6; .31; .03; .02 INJECTION, SOLUTION INTRAVENOUS at 08:04

## 2025-07-18 RX ADMIN — FENTANYL CITRATE 50 MCG: 50 INJECTION, SOLUTION INTRAMUSCULAR; INTRAVENOUS at 08:15

## 2025-07-18 RX ADMIN — PROPOFOL 30 MG: 10 INJECTION, EMULSION INTRAVENOUS at 08:54

## 2025-07-18 RX ADMIN — ONDANSETRON 4 MG: 2 INJECTION, SOLUTION INTRAMUSCULAR; INTRAVENOUS at 08:15

## 2025-07-18 RX ADMIN — DEXAMETHASONE SODIUM PHOSPHATE 4 MG: 4 INJECTION, SOLUTION INTRA-ARTICULAR; INTRALESIONAL; INTRAMUSCULAR; INTRAVENOUS; SOFT TISSUE at 08:15

## 2025-07-18 RX ADMIN — CEFAZOLIN 2000 MG: 2 INJECTION, POWDER, FOR SOLUTION INTRAMUSCULAR; INTRAVENOUS at 08:00

## 2025-07-18 RX ADMIN — PROPOFOL 150 MG: 10 INJECTION, EMULSION INTRAVENOUS at 08:10

## 2025-07-18 RX ADMIN — VASOPRESSIN 2 UNITS: 20 INJECTION INTRAVENOUS at 09:02

## 2025-07-18 RX ADMIN — EPHEDRINE SULFATE 10 MG: 5 INJECTION INTRAVENOUS at 08:40

## 2025-07-18 RX ADMIN — FENTANYL CITRATE 50 MCG: 50 INJECTION, SOLUTION INTRAMUSCULAR; INTRAVENOUS at 08:11

## 2025-07-18 RX ADMIN — ONDANSETRON 4 MG: 2 INJECTION, SOLUTION INTRAMUSCULAR; INTRAVENOUS at 10:15

## 2025-07-18 RX ADMIN — EPHEDRINE SULFATE 10 MG: 5 INJECTION INTRAVENOUS at 08:19

## 2025-07-18 RX ADMIN — EPHEDRINE SULFATE 10 MG: 5 INJECTION INTRAVENOUS at 08:17

## 2025-07-18 NOTE — ANESTHESIA PROCEDURE NOTES
Airway  Reason: elective    Date/Time: 7/18/2025 8:11 AM    General Information and Staff    Patient location during procedure: OR  CRNA/CAA: Jessica Knox CRNA    Indications and Patient Condition  Indications for airway management: airway protection    Preoxygenated: yes    Mask difficulty assessment: 0 - not attempted    Final Airway Details    Final airway type: supraglottic airway      Successful airway: I-gel  Size: 4   Number of attempts at approach: 1  Assessment: lips, teeth, and gum same as pre-op and atraumatic intubation

## 2025-07-18 NOTE — H&P
07/18/25   Foot and Ankle Surgery - Pre-Op H&P  Provider: Dr. Sanjiv Yi DPM  Location: [facility]    Subjective:  Bernice Barrow is a 70 y.o. female.     CC: Right foot pain    HPI: Patient presents for surgery involving the right foot.  She denies any new issues or concerns.    Allergies   Allergen Reactions    Hydrocodone Nausea And Vomiting    Betadine [Povidone Iodine] Itching    Codeine GI Intolerance    Oxycodone Nausea And Vomiting    Nickel Rash       Past Medical History:   Diagnosis Date    Abnormal ECG     Allergic 01/01/1967    Allergies most of my life    Anemia     Ankle sprain     Arthritis     Arthritis of back     Arthritis of neck     Bronchitis     h/o    Bursitis of hip     Cataract [id number]    Cervical disc disorder     Chronic pain disorder     Depression [id number]    Difficulty walking     Extremity pain     Fracture, foot     GERD (gastroesophageal reflux disease)     Hammer toe     Hip arthrosis     Hypertension     Hypothyroidism [id number]    Joint pain     Knee sprain     Knee swelling     Low back pain     Lumbosacral disc disease     Neck pain     Peripheral neuropathy     Rheumatoid arthritis     Scoliosis [id number]    Shingles     Sleep apnea     Stress fracture     Thyroid disease     Visual impairment [id number]       Past Surgical History:   Procedure Laterality Date    COLONOSCOPY      CORRECTION HAMMER TOE      ENDOSCOPY      FOOT FRACTURE SURGERY Right     FOOT SURGERY Bilateral     heel spurs    FRACTURE SURGERY      HYSTERECTOMY      JOINT REPLACEMENT  1957    ORTHOPEDIC SURGERY      REPLACEMENT TOTAL KNEE Right     2014    REPLACEMENT TOTAL KNEE Left     2021    TOTAL HIP ARTHROPLASTY Left 06/24/2024    Procedure: TOTAL HIP ARTHROPLASTY ANTERIOR;  Surgeon: Alvaro Comer MD;  Location: [facility] OR;  Service: Orthopedics;  Laterality: Left;    TUBAL ABDOMINAL LIGATION         Family History   Problem Relation Age of Onset    Arthritis Mother      Depression Mother     Hyperlipidemia Mother     Hypertension Mother     Sleep apnea Father     Arthritis Father     Asthma Father     Cancer Father         Esophageal Ca    Scoliosis Father     Sleep apnea Sister     COPD Sister     Depression Sister     Drug abuse Sister     Alcohol abuse Paternal Uncle     Early death Maternal Grandfather         Heart attack at 30 yrs old       Social History     Socioeconomic History    Marital status:    Tobacco Use    Smoking status: Former     Average packs/day: 0.3 packs/day for 3.4 years (0.9 ttl pk-yrs)     Types: Cigarettes     Start date: 1/1/2021     Passive exposure: Current    Smokeless tobacco: Never    Tobacco comments:     Off and on   Vaping Use    Vaping status: Some Days    Last attempt to quit: 6/7/2024    Passive vaping exposure: Yes   Substance and Sexual Activity    Alcohol use: Not Currently     Comment: ocassional    Drug use: Never    Sexual activity: Not Currently     Partners: Male     Birth control/protection: Other, Post-menopausal, Hysterectomy          Current Facility-Administered Medications:     ceFAZolin 2000 mg IVPB in 100 mL NS (MBP), 2,000 mg, Intravenous, Once, Kd, KRIS Kincaid, DPM    lidocaine PF 1% (XYLOCAINE) injection 0.5 mL, 0.5 mL, Intradermal, Once PRN, KRIS Yi, DPM    sodium chloride 0.9 % flush 10 mL, 10 mL, Intravenous, PRN, Kd, T Sanjiv, DPM    Review of Systems:  General: Denies fever, chills, fatigue, and weakness.  Eyes: Denies vision loss, blurry vision, and excessive redness.  ENT: Denies hearing issues and difficulty swallowing.  Cardiovascular: Denies palpitations, chest pain, or syncopal episodes.  Respiratory: Denies shortness of breath, wheezing, and coughing.  GI: Denies abdominal pain, nausea, and vomiting.   : Denies frequency, hematuria, and urgency.  Musculoskeletal: + right foot pain  Derm: Denies rash, open wounds, or suspicious lesions.  Neuro: Denies headaches, numbness, loss of  "coordination, and tremors.  Psych: Denies anxiety and depression.  Endocrine: Denies temperature intolerance and changes in appetite.  Heme: Denies bleeding disorders or abnormal bruising.     Objective   /73   Pulse 61   Temp 98.3 °F (36.8 °C)   Resp 12   Ht 157.5 cm (62\")   Wt 84.4 kg (186 lb)   SpO2 97%   BMI 34.02 kg/m²     Foot/Ankle Exam  GENERAL  Orientation:  AAOx3  Affect:  appropriate     VASCULAR      Right Foot Vascularity   Normal vascular exam    Dorsalis pedis:  2+  Posterior tibial:  2+  Skin temperature:  warm  Edema grading:  None  CFT:  < 3 seconds  Pedal hair growth:  Present  Varicosities:  none      Left Foot Vascularity   Normal vascular exam    Dorsalis pedis:  2+  Posterior tibial:  2+  Skin temperature:  warm  Edema grading:  None  CFT:  < 3 seconds  Pedal hair growth:  Present  Varicosities:  none     NEUROLOGIC      Right Foot Neurologic   Light touch sensation: normal  Hot/Cold sensation: normal  Achilles reflex:  2+      Left Foot Neurologic   Light touch sensation: normal  Hot/Cold sensation:  normal  Achilles reflex:  2+     MUSCULOSKELETAL      Right Foot Musculoskeletal   Arch:  Normal      Left Foot Musculoskeletal   Arch:  Normal     MUSCLE STRENGTH      Right Foot Muscle Strength   Normal strength    Foot dorsiflexion:  5  Foot plantar flexion:  5  Foot inversion:  5  Foot eversion:  5      Left Foot Muscle Strength   Normal strength    Foot dorsiflexion:  5  Foot plantar flexion:  5  Foot inversion:  5  Foot eversion:  5     DERMATOLOGIC       Right Foot Dermatologic   Skin  Right foot skin is intact.   Nails comment:  Nails 1-5      Left Foot Dermatologic   Skin  Left foot skin is intact.   Nails comment:  Nails 1-5     TESTS      Right Foot Tests   Anterior drawer: negative  Varus tilt: negative      Left Foot Tests   Anterior drawer: negative  Varus tilt: negative     Right foot additional comments: Significant pes planus foot structure and prominent rigidity " involving the right foot. Bony prominence noted to various aspects of the dorsal aspect of the forefoot and midfoot. Diffuse discomfort with palpation. Swelling involving the right lower extremity. Moderate equinus contracture with knee extended and flexed.     03/28/2024  Continued discomfort involving the foot. No new issues or concerns.     8/14/24: Continued pain and discomfort involving the dorsal aspect of the right midfoot.  No progressive deformity or instability.  Mild swelling noted today.  Continued equinus contracture with knee extended and flexed.     9/26/24: Less discomfort and swelling involving the right midfoot.  No progressive deformity or instability.     2/6/25: Physical exam is unchanged.     3/6/25: Exam is unchanged.  No new issues or concerns.  Continued rigidity involving the midfoot.     6/5/25: Continued discomfort involving the right midfoot.  No significant changes as compared to previous exam.    7/18/25: Physical exam is unchanged.        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  Patient understands that she remains at risk of bleeding, wound healing complications, burning, numbness, tingling, and potentially loss of function.  She also understands that she could have continued pain from the degenerative changes involving the midfoot despite best efforts with surgery.  Patient would like to proceed with surgery at this time.    Note is dictated utilizing voice recognition software. Unfortunately this leads to occasional typographical errors. I apologize in advance if the situation occurs. If questions occur please do not hesitate to call our office.

## 2025-07-18 NOTE — ANESTHESIA POSTPROCEDURE EVALUATION
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        Post Anesthesia Care and Evaluation    Patient location during evaluation: PACU  Patient participation: complete - patient participated  Level of consciousness: awake  Pain scale: See nurse's notes for pain score.  Pain management: adequate    Airway patency: patent  Anesthetic complications: No anesthetic complications  PONV Status: none  Cardiovascular status: acceptable  Respiratory status: acceptable and spontaneous ventilation  Hydration status: acceptable    Comments: Patient seen and examined postoperatively; vital signs stable; SpO2 greater than or equal to 90%; cardiopulmonary status stable; nausea/vomiting adequately controlled; pain adequately controlled; no apparent anesthesia complications; patient discharged from anesthesia care when discharge criteria were met

## 2025-07-18 NOTE — ANESTHESIA PREPROCEDURE EVALUATION
Anesthesia Evaluation     NPO Solid Status: > 8 hours  NPO Liquid Status: > 8 hours           Airway   Mallampati: II  TM distance: >3 FB  Neck ROM: full  No difficulty expected  Dental - normal exam     Pulmonary - normal exam   (+) ,sleep apnea on CPAP  Cardiovascular - normal exam    (+) hypertension well controlled      Neuro/Psych  (+) numbness, psychiatric history Depression  GI/Hepatic/Renal/Endo    (+) hiatal hernia, GERD well controlled, thyroid problem hypothyroidism    Musculoskeletal     (+) neck pain  Abdominal  - normal exam    Bowel sounds: normal.   Substance History      OB/GYN          Other   arthritis,                 Anesthesia Plan    ASA 2     general     intravenous induction     Anesthetic plan, risks, benefits, and alternatives have been provided, discussed and informed consent has been obtained with: patient.  Pre-procedure education provided  Plan discussed with CRNA.    CODE STATUS:

## 2025-07-18 NOTE — OP NOTE
Operative Note   Foot and Ankle Surgery   Provider: Dr. Sanjiv Yi   [facility]      Procedure:  1.  Deep peroneal nerve transection, right lower extremity - 56377  2.  Implantation of deep peroneal nerve into muscle, right - 44827    Pre-operative Diagnosis:   1.  Chronic right foot pain  2.  Deep peroneal nerve neuralgia, right  3.  Midtarsal arthritis, right  4.  Foot deformity, right    Post-operative Diagnosis: Same    Surgeon: Sanjiv Yi    Assistant: Yasmin Otoole, PGY 2    Anesthesia: General    Implants: None    Findings: No grossly abnormal findings involving the deep peroneal nerve at the level of the ankle.    Specimen: None    Blood Loss: Less than 5cc    Complications: None    Post Op Plan: Discharge home.  Weightbearing activities as needed in cam boot.  Follow-up with me in 2 weeks    Summary:    Patient is a 70-year-old female that has been seen in office for chronic pain involving her right foot.  Imaging shows significant degenerative changes involving the midfoot along with forefoot deformity.  Patient has tried and failed multiple conservative treatments.  She is tired of dealing with the pain and limitation.  We did discuss reconstruction as a potential option which patient does not want to consider.  She understands that this would require significant recovery with delayed and nonunion and continued pain.  We did discuss the consideration for a deep peroneal nerve transection and implantation into muscle.  I did explain the procedure and recovery with her at length.  She understands that she remains at risk of continued pain, numbness, tingling, bleeding risks, incision healing complications and chronic regional pain syndrome.  Patient agrees and would like to proceed with surgery.    Procedure, risks, complications, and goals were discussed with the patient at bedside.  Risks include but are not limited to infection, complications from anesthesia (including death),  chronic pain or numbness, hematoma/seroma, deep vein thrombosis, wound complications, and potential for additional surgical procedures.  Patient understands and elects to proceed with surgery at this time. Informed consent was obtained before proceeding to the operating suite.  All questions were answered to the patient's satisfaction. No guarantees or assurances were given or implied.    Procedure:    Patient was brought to the operating room and placed on the operative table in the supine position.  Once adequate general anesthesia was administered, a pneumatic tourniquet was placed about the patient's right thigh.  The right lower extremity was scrubbed prepped and draped in usual sterile fashion.  A formal timeout was conducted prior skin incision.     The dorsalis pedis was identified and marked out with the use of a sterile Doppler.  A linear longitudinal incision was performed just medial to the course of the dorsalis pedis.  The incision was performed through the skin and subcutaneous tissues.  The deep fascia was incised giving access to the neurovascular bundle.  The bundle was identified and freed of surrounding soft tissue with meticulous and careful dissection.  The deep peroneal nerve was identified just proximal to the ankle joint.  Prior to transecting the nerve, the Doppler was used again to identify the dorsalis pedis. The nerve was isolated and transected.  Approximately a 2 cm length of the nerve was transected.  Both ends of the nerve were sutured into the tibialis anterior muscle belly utilizing a 3-0 Vicryl.  No excessive bleeding or further concerns were noted to the right lower extremity.  The wound was irrigated with copious amounts of normal saline.  The deep fascia was reapproximated with 2-0 Vicryl in a simple interrupted manner.  The subcutaneous tissues were closed with 3-0 Vicryl and the skin was repaired with 3-0 nylon in a horizontal fashion.  The incision was dressed with Xeroform  and sterile compressive dressings.  Patient tolerated the procedure and anesthesia well.  She was transferred from the operating room to the recovery room with vital signs stable and neurovascular status unchanged to the right lower extremity.      Dr. Sanjiv Yi DPM  [facility]  [phone]    Note is dictated utilizing voice recognition software. Unfortunately this leads to occasional typographical errors. I apologize in advance if the situation occurs. If questions occur please do not hesitate to call our office.

## 2025-07-22 ENCOUNTER — TELEPHONE (OUTPATIENT)
Age: 70
End: 2025-07-22
Payer: MEDICARE

## 2025-07-24 RX ORDER — FLUOXETINE HYDROCHLORIDE 40 MG/1
40 CAPSULE ORAL DAILY
Qty: 90 CAPSULE | Refills: 2 | Status: SHIPPED | OUTPATIENT
Start: 2025-07-24

## 2025-07-31 ENCOUNTER — OFFICE VISIT (OUTPATIENT)
Age: 70
End: 2025-07-31
Payer: MEDICARE

## 2025-07-31 VITALS — RESPIRATION RATE: 20 BRPM | HEIGHT: 62 IN | BODY MASS INDEX: 34.23 KG/M2 | WEIGHT: 186 LBS

## 2025-07-31 DIAGNOSIS — M21.70 ACQUIRED UNEQUAL LIMB LENGTH: ICD-10-CM

## 2025-07-31 DIAGNOSIS — M21.862 ACQUIRED POSTERIOR EQUINUS OF BOTH LOWER EXTREMITIES: ICD-10-CM

## 2025-07-31 DIAGNOSIS — M21.961 FOOT DEFORMITY, RIGHT: ICD-10-CM

## 2025-07-31 DIAGNOSIS — M19.071 ARTHRITIS OF MIDTARSAL JOINT OF RIGHT FOOT: ICD-10-CM

## 2025-07-31 DIAGNOSIS — M79.671 RIGHT FOOT PAIN: Primary | ICD-10-CM

## 2025-07-31 DIAGNOSIS — M21.861 ACQUIRED POSTERIOR EQUINUS OF BOTH LOWER EXTREMITIES: ICD-10-CM

## 2025-07-31 PROCEDURE — 99024 POSTOP FOLLOW-UP VISIT: CPT | Performed by: PODIATRIST

## 2025-07-31 NOTE — PROGRESS NOTES
07/31/2025  Foot and Ankle Surgery - Established Patient/Follow-up  Provider: Dr. Sanjiv Yi DPM  Location: [facility]    Subjective:  Bernice Barrow is a 70 y.o. female.     Chief Complaint   Patient presents with    Right Lower Leg - Post-op     7/18/25 NEGRITA  Deep peroneal nerve transection, right lower extremity -   2.  Implantation of deep peroneal nerve into muscle, right      Post-op     PCP: Apollo Arellano APRN  Last PCP Visit:   6/18/24       History of Present Illness  The patient presents for evaluation of a surgical incision.    He reports a satisfactory condition of the surgical incision. He has been diligent in wearing the boot, although he occasionally removes it during sleep. He discontinued the use of pain medication after three days post-surgery.      Allergies   Allergen Reactions    Hydrocodone Nausea And Vomiting    Betadine [Povidone Iodine] Itching    Codeine GI Intolerance    Oxycodone Nausea And Vomiting    Nickel Rash       Current Outpatient Medications on File Prior to Visit   Medication Sig Dispense Refill    atenolol (TENORMIN) 25 MG tablet TAKE 1 TABLET BY MOUTH DAILY 30 tablet 1    dilTIAZem (TIAZAC) 180 MG 24 hr capsule TAKE 1 CAPSULE BY MOUTH DAILY 90 capsule 1    FLUoxetine (PROzac) 40 MG capsule TAKE 1 CAPSULE BY MOUTH DAILY 90 capsule 2    hydrALAZINE (APRESOLINE) 50 MG tablet TAKE 1 TABLET BY MOUTH TWICE DAILY 180 tablet 0    hydroCHLOROthiazide 25 MG tablet TAKE 1 TABLET BY MOUTH DAILY 90 tablet 1    hydrOXYzine (ATARAX) 25 MG tablet TAKE 1 TABLET BY MOUTH AT NIGHT AS NEEDED FOR ANXIETY 30 tablet 1    levothyroxine (SYNTHROID, LEVOTHROID) 175 MCG tablet TAKE 1 CAPSULE BY MOUTH EVERY MORNING 90 tablet 1    meloxicam (MOBIC) 15 MG tablet TAKE 1 TABLET BY MOUTH DAILY 90 tablet 2    multivitamin with minerals tablet tablet Take 1 tablet by mouth Daily.      ondansetron (Zofran) 4 MG tablet Take 1 tablet by mouth Every 8 (Eight) Hours As Needed for  "Nausea or Vomiting. 20 tablet 0    pantoprazole (PROTONIX) 40 MG EC tablet 90 tablets.      [DISCONTINUED] HYDROcodone-acetaminophen (NORCO) 7.5-325 MG per tablet Take 1 tablet by mouth Every 6 (Six) Hours As Needed for Moderate Pain (Pain). (Patient not taking: Reported on 7/31/2025) 28 tablet 0     Current Facility-Administered Medications on File Prior to Visit   Medication Dose Route Frequency Provider Last Rate Last Admin    triamcinolone acetonide (KENALOG-40) injection 20 mg  20 mg Intra-articular Once KRIS Yi DPM           Objective   Resp 20   Ht 157.5 cm (62\")   Wt 84.4 kg (186 lb)   BMI 34.02 kg/m²     Foot/Ankle Exam  Physical Exam  GENERAL  Orientation:  AAOx3  Affect:  appropriate     VASCULAR      Right Foot Vascularity   Normal vascular exam    Dorsalis pedis:  2+  Posterior tibial:  2+  Skin temperature:  warm  Edema grading:  None  CFT:  < 3 seconds  Pedal hair growth:  Present  Varicosities:  none      Left Foot Vascularity   Normal vascular exam    Dorsalis pedis:  2+  Posterior tibial:  2+  Skin temperature:  warm  Edema grading:  None  CFT:  < 3 seconds  Pedal hair growth:  Present  Varicosities:  none     NEUROLOGIC      Right Foot Neurologic   Light touch sensation: normal  Hot/Cold sensation: normal  Achilles reflex:  2+      Left Foot Neurologic   Light touch sensation: normal  Hot/Cold sensation:  normal  Achilles reflex:  2+     MUSCULOSKELETAL      Right Foot Musculoskeletal   Arch:  Normal      Left Foot Musculoskeletal   Arch:  Normal     MUSCLE STRENGTH      Right Foot Muscle Strength   Normal strength    Foot dorsiflexion:  5  Foot plantar flexion:  5  Foot inversion:  5  Foot eversion:  5      Left Foot Muscle Strength   Normal strength    Foot dorsiflexion:  5  Foot plantar flexion:  5  Foot inversion:  5  Foot eversion:  5     DERMATOLOGIC       Right Foot Dermatologic   Skin  Right foot skin is intact.   Nails comment:  Nails 1-5      Left Foot Dermatologic "   Skin  Left foot skin is intact.   Nails comment:  Nails 1-5     TESTS      Right Foot Tests   Anterior drawer: negative  Varus tilt: negative      Left Foot Tests   Anterior drawer: negative  Varus tilt: negative     Right foot additional comments: Significant pes planus foot structure and prominent rigidity involving the right foot. Bony prominence noted to various aspects of the dorsal aspect of the forefoot and midfoot. Diffuse discomfort with palpation. Swelling involving the right lower extremity. Moderate equinus contracture with knee extended and flexed.     03/28/2024  Continued discomfort involving the foot. No new issues or concerns.     8/14/24: Continued pain and discomfort involving the dorsal aspect of the right midfoot.  No progressive deformity or instability.  Mild swelling noted today.  Continued equinus contracture with knee extended and flexed.     9/26/24: Less discomfort and swelling involving the right midfoot.  No progressive deformity or instability.     2/6/25: Physical exam is unchanged.     3/6/25: Exam is unchanged.  No new issues or concerns.  Continued rigidity involving the midfoot.     6/5/25: Continued discomfort involving the right midfoot.  No significant changes as compared to previous exam.    7/31/25: Incision site is dry and stable with intact sutures.  No evidence of dehiscence or infection.  Mild swelling present.  No calf pain.  No unexpected findings      Results      Assessment & Plan   Diagnoses and all orders for this visit:    1. Right foot pain (Primary)    2. Acquired unequal limb length    3. 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 Foot deformity, right    5. Acquired posterior equinus of both lower extremities      Assessment & Plan    The incision is healing well, with no signs of infection or complications. The sutures were removed today. He was advised to continue wearing the boot and limit physical activity to prevent excessive strain on the healing  tissues. He can shower and get the incision wet but should avoid excessive range of motion to protect the soft tissue structures. A Tubigrip was provided for additional compression support. If any issues arise, such as the incision opening or not healing as expected, he should contact the office immediately.    Follow-up  A follow-up appointment is scheduled for 2 weeks from now.                 Patient or patient representative verbalized consent for the use of Ambient Listening during the visit with  KRIS Yi DPM for chart documentation. 7/31/2025  10:11 EDT    KRIS Yi DPM

## 2025-08-11 ENCOUNTER — OFFICE VISIT (OUTPATIENT)
Dept: ORTHOPEDIC SURGERY | Facility: CLINIC | Age: 70
End: 2025-08-11
Payer: MEDICARE

## 2025-08-11 VITALS — HEIGHT: 62 IN | WEIGHT: 186 LBS | BODY MASS INDEX: 34.23 KG/M2 | HEART RATE: 69 BPM

## 2025-08-11 DIAGNOSIS — G89.29 CHRONIC LEFT SHOULDER PAIN: ICD-10-CM

## 2025-08-11 DIAGNOSIS — M25.512 CHRONIC LEFT SHOULDER PAIN: ICD-10-CM

## 2025-08-11 DIAGNOSIS — S42.112K: Primary | ICD-10-CM

## 2025-08-11 PROCEDURE — 99213 OFFICE O/P EST LOW 20 MIN: CPT | Performed by: ORTHOPAEDIC SURGERY

## 2025-08-14 ENCOUNTER — OFFICE VISIT (OUTPATIENT)
Age: 70
End: 2025-08-14
Payer: MEDICARE

## 2025-08-14 VITALS — OXYGEN SATURATION: 96 % | HEIGHT: 62 IN | WEIGHT: 186 LBS | BODY MASS INDEX: 34.23 KG/M2 | HEART RATE: 60 BPM

## 2025-08-14 DIAGNOSIS — M79.671 RIGHT FOOT PAIN: Primary | ICD-10-CM

## 2025-08-14 DIAGNOSIS — M21.961 FOOT DEFORMITY, RIGHT: ICD-10-CM

## 2025-08-14 DIAGNOSIS — M19.071 ARTHRITIS OF MIDTARSAL JOINT OF RIGHT FOOT: ICD-10-CM

## 2025-08-14 PROCEDURE — 99024 POSTOP FOLLOW-UP VISIT: CPT | Performed by: PODIATRIST

## 2025-08-24 DIAGNOSIS — I10 HYPERTENSION, UNSPECIFIED TYPE: ICD-10-CM

## 2025-08-25 RX ORDER — HYDRALAZINE HYDROCHLORIDE 50 MG/1
50 TABLET, FILM COATED ORAL 2 TIMES DAILY
Qty: 180 TABLET | Refills: 0 | Status: SHIPPED | OUTPATIENT
Start: 2025-08-25

## 2025-08-27 ENCOUNTER — HOSPITAL ENCOUNTER (OUTPATIENT)
Dept: CT IMAGING | Facility: HOSPITAL | Age: 70
Discharge: HOME | End: 2025-08-27
Admitting: ORTHOPAEDIC SURGERY
Payer: MEDICARE

## 2025-08-27 DIAGNOSIS — S42.112K: ICD-10-CM

## 2025-08-27 PROCEDURE — 73200 CT UPPER EXTREMITY W/O DYE: CPT

## 2025-08-28 ENCOUNTER — TELEPHONE (OUTPATIENT)
Dept: ORTHOPEDIC SURGERY | Facility: CLINIC | Age: 70
End: 2025-08-28
Payer: MEDICARE

## (undated) DEVICE — PK EXTREM 50

## (undated) DEVICE — KT SURG TURNOVER 050

## (undated) DEVICE — ANTIBACTERIAL UNDYED BRAIDED (POLYGLACTIN 910), SYNTHETIC ABSORBABLE SUTURE: Brand: COATED VICRYL

## (undated) DEVICE — SOLUTION,WATER,IRRIGATION,1000ML,STERILE: Brand: MEDLINE

## (undated) DEVICE — SUT ETHIB 2 CV V37 MS/4 30IN MX69G

## (undated) DEVICE — PCH SURG INVISISHIELD FLD/COL W/DRN/PRT 20X6IN

## (undated) DEVICE — THE STERILE CAMERA HANDLE COVER IS FOR USE WITH THE STERIS SURGICAL LIGHTING AND VISUALIZATION SYSTEMS.

## (undated) DEVICE — PICO 7 10CM X 20CM: Brand: PICO™ 7

## (undated) DEVICE — DISPOSABLE TOURNIQUET CUFF SINGLE BLADDER, SINGLE PORT AND QUICK CONNECT CONNECTOR: Brand: COLOR CUFF

## (undated) DEVICE — BANDAGE,GAUZE,BULKEE II,4.5"X4.1YD,STRL: Brand: MEDLINE

## (undated) DEVICE — ZIPPERED TOGA, 2X LARGE: Brand: FLYTE

## (undated) DEVICE — SOL IRR H2O BO 1000ML STRL

## (undated) DEVICE — THE STERILE LIGHT HANDLE COVER IS USED WITH STERIS SURGICAL LIGHTING AND VISUALIZATION SYSTEMS.

## (undated) DEVICE — SOL NACL 0.9PCT 1000ML

## (undated) DEVICE — PK TOTL HIP 50

## (undated) DEVICE — GOWN,SIRUS,POLYRNF,BRTHSLV,XL,30/CS: Brand: MEDLINE

## (undated) DEVICE — GAUZE,SPONGE,4"X4",32PLY,XRAY,STRL,LF: Brand: MEDLINE

## (undated) DEVICE — GLV SURG SENSICARE PI LF PF 8 GRN STRL

## (undated) DEVICE — BIPOLAR SEALER 23-112-1 AQM 6.0: Brand: AQUAMANTYS™

## (undated) DEVICE — DRAPE,U/ SHT,SPLIT,PLAS,STERIL: Brand: MEDLINE

## (undated) DEVICE — SOL ISO/ALC RUB 91PCT 16OZ

## (undated) DEVICE — APPL CHLORAPREP HI/LITE 26ML ORNG

## (undated) DEVICE — GLV SURG BIOGEL M LTX PF 7 1/2

## (undated) DEVICE — SUT ETHLN 3/0 FS1 30IN 669H

## (undated) DEVICE — 3M™ STERI-STRIP™ REINFORCED ADHESIVE SKIN CLOSURES, R1541, 1/4 IN X 3 IN (6 MM X 75 MM), 3 STRIPS/ENVELOPE: Brand: 3M™ STERI-STRIP™

## (undated) DEVICE — ADHS LIQ MASTISOL 2/3ML

## (undated) DEVICE — CUFF TOURNI 1BLADDER 1PRT 12IN STRL

## (undated) DEVICE — C-ARM: Brand: UNBRANDED

## (undated) DEVICE — PENCL HND ROCKRSWTCH HOLSTR EZ CLEAN TP CRD 10FT

## (undated) DEVICE — ANESTH CIRCUIT 60IN 3LTR-LF: Brand: MEDLINE INDUSTRIES, INC.

## (undated) DEVICE — 3M™ STERI-DRAPE™  ISOLATION DRAPE WITH INCISE FILM AND POUCH 1017: Brand: STERI-DRAPE™

## (undated) DEVICE — ADHS SKIN PREMIERPRO EXOFIN TOPICAL HI/VISC .5ML

## (undated) DEVICE — DRSNG GZ CURAD XEROFORM PETROLTM OVERWRP 1X8IN STRL

## (undated) DEVICE — SUT MNCRYL 4/0 PS2 27IN UD MCP426H

## (undated) DEVICE — NEEDLE, QUINCKE, 20GX3.5": Brand: MEDLINE

## (undated) DEVICE — DUAL CUT SAGITTAL BLADE

## (undated) DEVICE — GLV SURG SENSICARE PI ORTHO SZ8.5 LF STRL

## (undated) DEVICE — GLOVE,SURG,SENSICARE SLT,LF,PF,7.5: Brand: MEDLINE

## (undated) DEVICE — SOL IRR NACL 0.9PCT ARTHROMATIC 3000ML

## (undated) DEVICE — SHEET, DRAPE, SPLIT, STERILE: Brand: MEDLINE

## (undated) DEVICE — WRP COMPR HIP SMI/COLDTHERAPY/PREM 4GELBG3HR/24 1P/U 6PK

## (undated) DEVICE — PACK,UNIVERSAL,NO GOWNS: Brand: MEDLINE

## (undated) DEVICE — DRP SURG U/DRP INVISISHIELD PA 48X52IN

## (undated) DEVICE — SPNG GZ WOVN 4X4IN 12PLY 10/BX STRL

## (undated) DEVICE — KT PT POSITION SUPINE HANA/PROFX TABL

## (undated) DEVICE — BANDAGE,ELASTIC,ESMARK,STERILE,6"X9',LF: Brand: MEDLINE

## (undated) DEVICE — GOWN,PREVENTION PLUS,XL,ST,24/CS: Brand: MEDLINE

## (undated) DEVICE — UNDERGLV SURG BIOGEL INDICAT PI SZ8 BLU

## (undated) DEVICE — PENCL SMOKE/EVAC MEGADYNE TELESCP 15FT

## (undated) DEVICE — SUT MNCRYL 2/0 CT1 36IN UD MCP945H

## (undated) DEVICE — IRRIGATOR BULB ASEPTO 60CC STRL